# Patient Record
Sex: FEMALE | Race: BLACK OR AFRICAN AMERICAN | Employment: FULL TIME | URBAN - METROPOLITAN AREA
[De-identification: names, ages, dates, MRNs, and addresses within clinical notes are randomized per-mention and may not be internally consistent; named-entity substitution may affect disease eponyms.]

---

## 2017-01-11 ENCOUNTER — OFFICE VISIT (OUTPATIENT)
Dept: FAMILY MEDICINE CLINIC | Age: 47
End: 2017-01-11

## 2017-01-11 VITALS
HEART RATE: 79 BPM | DIASTOLIC BLOOD PRESSURE: 88 MMHG | HEIGHT: 68 IN | SYSTOLIC BLOOD PRESSURE: 132 MMHG | TEMPERATURE: 97.8 F | OXYGEN SATURATION: 98 % | BODY MASS INDEX: 27.74 KG/M2 | WEIGHT: 183 LBS | RESPIRATION RATE: 14 BRPM

## 2017-01-11 DIAGNOSIS — E66.9 OBESITY, CLASS I, BMI 30-34.9: ICD-10-CM

## 2017-01-11 DIAGNOSIS — E78.2 MIXED HYPERLIPIDEMIA: Primary | ICD-10-CM

## 2017-01-11 RX ORDER — ATORVASTATIN CALCIUM 40 MG/1
40 TABLET, FILM COATED ORAL DAILY
Qty: 90 TAB | Refills: 1 | Status: SHIPPED | OUTPATIENT
Start: 2017-01-11 | End: 2017-07-10 | Stop reason: SDUPTHER

## 2017-01-11 NOTE — PATIENT INSTRUCTIONS
Body Mass Index: Care Instructions  Your Care Instructions    Body mass index (BMI) can help you see if your weight is raising your risk for health problems. It uses a formula to compare how much you weigh with how tall you are. A BMI between 18.5 and 24.9 is considered healthy. A BMI between 25 and 29.9 is considered overweight. A BMI of 30 or higher is considered obese. If your BMI is in the normal range, it means that you have a lower risk for weight-related health problems. If your BMI is in the overweight or obese range, you may be at increased risk for weight-related health problems, such as high blood pressure, heart disease, stroke, arthritis or joint pain, and diabetes. BMI is just one measure of your risk for weight-related health problems. You may be at higher risk for health problems if you are not active, you eat an unhealthy diet, or you drink too much alcohol or use tobacco products. Follow-up care is a key part of your treatment and safety. Be sure to make and go to all appointments, and call your doctor if you are having problems. It's also a good idea to know your test results and keep a list of the medicines you take. How can you care for yourself at home? · Practice healthy eating habits. This includes eating plenty of fruits, vegetables, whole grains, lean protein, and low-fat dairy. · Get at least 30 minutes of exercise 5 days a week or more. Brisk walking is a good choice. You also may want to do other activities, such as running, swimming, cycling, or playing tennis or team sports. · Do not smoke. Smoking can increase your risk for health problems. If you need help quitting, talk to your doctor about stop-smoking programs and medicines. These can increase your chances of quitting for good. · Limit alcohol to 2 drinks a day for men and 1 drink a day for women. Too much alcohol can cause health problems.   If you have a BMI higher than 25  · Your doctor may do other tests to check your risk for weight-related health problems. This may include measuring the distance around your waist. A waist measurement of more than 40 inches in men or 35 inches in women can increase the risk of weight-related health problems. · Talk with your doctor about steps you can take to stay healthy or improve your health. You may need to make lifestyle changes to lose weight and stay healthy, such as changing your diet and getting regular exercise. Where can you learn more? Go to http://noé-jose.info/. Enter S176 in the search box to learn more about \"Body Mass Index: Care Instructions. \"  Current as of: February 16, 2016  Content Version: 11.1  © 8314-6402 [employer]. Care instructions adapted under license by [employer] (which disclaims liability or warranty for this information). If you have questions about a medical condition or this instruction, always ask your healthcare professional. [address] any warranty or liability for your use of this information.

## 2017-01-11 NOTE — LETTER
1/30/2017 9:00 AM 
 
Ms. Nicky Rodriguez [phone] Dear Nicky Rodriguez: 
 
Please find your most recent results below. Resulted Orders CBC WITH AUTOMATED DIFF Result Value Ref Range WBC 5.9 3.4 - 10.8 x10E3/uL  
 RBC 4.14 3.77 - 5.28 x10E6/uL HGB 13.0 11.1 - 15.9 g/dL HCT 38.3 34.0 - 46.6 % MCV 93 79 - 97 fL  
 MCH 31.4 26.6 - 33.0 pg  
 MCHC 33.9 31.5 - 35.7 g/dL  
 RDW 13.0 12.3 - 15.4 % PLATELET 906 059 - 664 x10E3/uL NEUTROPHILS 55 % Lymphocytes 36 % MONOCYTES 8 % EOSINOPHILS 1 % BASOPHILS 0 %  
 ABS. NEUTROPHILS 3.2 1.4 - 7.0 x10E3/uL Abs Lymphocytes 2.1 0.7 - 3.1 x10E3/uL  
 ABS. MONOCYTES 0.5 0.1 - 0.9 x10E3/uL  
 ABS. EOSINOPHILS 0.0 0.0 - 0.4 x10E3/uL  
 ABS. BASOPHILS 0.0 0.0 - 0.2 x10E3/uL IMMATURE GRANULOCYTES 0 %  
 ABS. IMM. GRANS. 0.0 0.0 - 0.1 x10E3/uL Narrative Performed at:  [address]  [id number] : Trang Leal MD, Phone:  [phone] METABOLIC PANEL, COMPREHENSIVE Result Value Ref Range Glucose 91 65 - 99 mg/dL BUN 8 6 - 24 mg/dL Creatinine 1.06 (H) 0.57 - 1.00 mg/dL GFR est non-AA 63 >59 mL/min/1.73 GFR est AA 72 >59 mL/min/1.73  
 BUN/Creatinine ratio 8 (L) 9 - 23 Sodium 140 134 - 144 mmol/L Potassium 4.0 3.5 - 5.2 mmol/L Chloride 102 96 - 106 mmol/L  
 CO2 21 18 - 29 mmol/L Calcium 8.9 8.7 - 10.2 mg/dL Protein, total 7.0 6.0 - 8.5 g/dL Albumin 4.1 3.5 - 5.5 g/dL GLOBULIN, TOTAL 2.9 1.5 - 4.5 g/dL A-G Ratio 1.4 1.1 - 2.5 Bilirubin, total 0.2 0.0 - 1.2 mg/dL Alk. phosphatase 73 39 - 117 IU/L  
 AST 11 0 - 40 IU/L  
 ALT 9 0 - 32 IU/L Narrative Performed at:  [address]  [id number] : Trang Leal MD, Phone:  [phone] LIPID PANEL Result Value Ref Range Cholesterol, total 159 100 - 199 mg/dL Triglyceride 103 0 - 149 mg/dL HDL Cholesterol 52 >39 mg/dL VLDL, calculated 21 5 - 40 mg/dL LDL, calculated 86 0 - 99 mg/dL Narrative Performed at:  [address]  [id number] : Vika Bowles MD, Phone:  [phone] CVD REPORT Result Value Ref Range INTERPRETATION Note Comment:  
   Supplement report is available. Narrative Performed at:  [address] [place] [address]  [id number] : Stu Curtis PhD, Phone:  [phone] RECOMMENDATIONS: 
Your labs are stable. Please call me if you have any questions: [phone] Sincerely, Sheree Callahan MD

## 2017-01-11 NOTE — PROGRESS NOTES
Patient presents in office today for 6 month fasting office visit for chl. No other concerns    Reviewed record in preparation for visit and have obtained necessary documentation. Identified pt with two pt identifiers(name and ). There are no preventive care reminders to display for this patient.       Chief Complaint   Patient presents with    Cholesterol Problem     6 month fasting follow up visit        Wt Readings from Last 3 Encounters:   17 183 lb (83 kg)   16 199 lb (90.3 kg)   16 186 lb (84.4 kg)     Temp Readings from Last 3 Encounters:   17 97.8 °F (36.6 °C) (Oral)   16 95.6 °F (35.3 °C) (Oral)   16 97.9 °F (36.6 °C) (Oral)     BP Readings from Last 3 Encounters:   17 141/88   16 138/72   16 138/80     Pulse Readings from Last 3 Encounters:   17 79   16 69   16 77           Learning Assessment:  :     Learning Assessment 2017 2016 2014 3/27/2014   PRIMARY LEARNER Patient Patient Patient Patient   HIGHEST LEVEL OF EDUCATION - PRIMARY LEARNER  - 4 YEARS OF COLLEGE 4 YEARS OF COLLEGE -   BARRIERS PRIMARY LEARNER - - NONE -   CO-LEARNER CAREGIVER - - No -   PRIMARY LANGUAGE ENGLISH ENGLISH ENGLISH ENGLISH    NEED - - No -   LEARNER PREFERENCE PRIMARY READING READING READING READING     - - DEMONSTRATION -   ANSWERED BY patient patient patient patient   RELATIONSHIP SELF SELF SELF SELF       Coordination of Care Questionnaire:  :     1) Have you been to an emergency room, urgent care clinic since your last visit? no   Hospitalized since your last visit? no             2) Have you seen or consulted any other health care providers outside of [address] [place] [address] since your last visit? no  (Include any pap smears or colon screenings in this section.)    Patient is accompanied by self I have received verbal consent from Alban Carvajal to discuss any/all medical information while they are present in the room.

## 2017-01-11 NOTE — PROGRESS NOTES
HISTORY OF PRESENT ILLNESS  Richard Coles is a 51 y.o. female. Blood pressure 141/88, pulse 79, temperature 97.8 °F (36.6 °C), temperature source Oral, resp. rate 14, height 5' 8\" (1.727 m), weight 183 lb (83 kg), last menstrual period 01/21/2016, SpO2 98 %. Body mass index is 27.83 kg/(m^2). Chief Complaint   Patient presents with    Cholesterol Problem     6 month fasting follow up visit        HPI   Richard Coles 51 y.o. female  presents to the office today for a follow up on chronic conditions. Elevated bp: Bp at office today 141/88, 132/88 with manual arm cuff recheck. Monitor. Hyperlipidemia: Lipid panel on 07/13/16 notable for total cholesterol 165, LDL 94, HDL 52, and triglycerides 97. Pt continues with Lipitor 40 mg daily. Cholesterol is presumed stable, will assess levels today. Weight management: Pt continues with Contrave  mg daily. Pt has lost about 16 lbs since 07/13/16 and states Contrave has helped her with reducing appetite. She notes she is taking 2 tablets Contrave daily instead of BID because she feels the daily dosage is enough to help with appetite and due to associated side effects of elevated bp. Congratulated pt on weight loss and advised her to continue current regimen. Current Outpatient Prescriptions   Medication Sig Dispense Refill    naltrexone-buPROPion (CONTRAVE) 8-90 mg TbER ER tablet First Month: Contrave 8mg/90mg #70    Week 1 : 1 Tab AM  Week 2 : 1 Tab AM, 1 Tab PM  Week 3 : 2 Tab AM, 1 Tab PM  Week 4 : 2 Tab AM, 2 Tab PM    Week 5 and Beyond: Contrave 8mg/90mg #120   2 Tab AM, 2 Tab  Tab 3    atorvastatin (LIPITOR) 40 mg tablet Take 1 Tab by mouth daily. 90 Tab 1    doxylamine succinate (UNISOM) 25 mg tablet Take 25 mg by mouth nightly as needed for Sleep.  naproxen (NAPROSYN) 500 mg tablet Take 1 Tab by mouth two (2) times daily (with meals).  30 Tab 3    norethindrone-ethinyl estradiol (BALZIVA, 28,) 0.4-35 mg-mcg per tablet Take 1 Tab by mouth daily.  FLUARIX QUAD 0550-5836, PF, syrg injection inject 0.5 milliliter intramuscularly  0     Allergies   Allergen Reactions    Percocet [Oxycodone-Acetaminophen] Itching     Past Medical History   Diagnosis Date    Hypercholesterolemia      Past Surgical History   Procedure Laterality Date    Hx gyn       abnormal pap, fibroids    Hx cholecystectomy  2002           Family History   Problem Relation Age of Onset    Asthma Mother     Elevated Lipids Father     Hypertension Father     Cancer Father      anal cancer    Diabetes Paternal Aunt     Cancer Maternal Grandmother      colon    Cancer Paternal Grandmother      leukemia     Social History   Substance Use Topics    Smoking status: Former Smoker     Quit date: 1/13/2013    Smokeless tobacco: Never Used    Alcohol use Yes      Comment: occassional        Review of Systems   Constitutional: Negative. Negative for malaise/fatigue. Eyes: Negative for blurred vision. Respiratory: Negative for shortness of breath. Cardiovascular: Negative for chest pain and leg swelling. Musculoskeletal: Negative. Neurological: Negative. Negative for dizziness and headaches. All other systems reviewed and are negative. Physical Exam   Constitutional: She is oriented to person, place, and time. She appears well-developed and well-nourished. No distress. HENT:   Head: Normocephalic and atraumatic. Neck: Carotid bruit is not present. Cardiovascular: Normal rate, regular rhythm, normal heart sounds and intact distal pulses. Exam reveals no gallop and no friction rub. No murmur heard. Pulmonary/Chest: Effort normal and breath sounds normal. No respiratory distress. She has no wheezes. She has no rales. Musculoskeletal: She exhibits no edema. Neurological: She is alert and oriented to person, place, and time. Skin: She is not diaphoretic. Psychiatric: She has a normal mood and affect.  Her behavior is normal. Judgment and thought content normal.   Nursing note and vitals reviewed. ASSESSMENT and PLAN  Angie Hough was seen today for cholesterol problem. Diagnoses and all orders for this visit:    Mixed hyperlipidemia  -     atorvastatin (LIPITOR) 40 mg tablet; Take 1 Tab by mouth daily.  -     METABOLIC PANEL, COMPREHENSIVE  -     LIPID PANEL  - Presumed stable, will assess levels today    Elevated blood pressure  -     CBC WITH AUTOMATED DIFF  - Bp came down with recheck. Monitor. Obesity, Class I, BMI 30-34.9  -     naltrexone-buPROPion (CONTRAVE) 8-90 mg TbER ER tablet; Take 2 Tabs by mouth daily. Indications: WT LOSS MGMT, PT WITH BMI 27-29 & WT-RELATED COMORBIDITY  - Discussed the patient's above normal BMI with her. I have recommended the following interventions: dietary management education, guidance, and counseling  dietary needs education  encourage exercise  feeding regime  lifestyle education regarding diet  monitor weight  nutrition therapy . The BMI follow up plan is as follows: BMI is out of normal parameters and plan is as follows: I have counseled this patient on diet and exercise regimens      Follow-up Disposition:  Return in about 6 months (around 7/11/2017) for hyperlipidemia follow up. Medication risks/benefits/costs/interactions/alternatives discussed with patient. Advised patient to call back or return to office if symptoms worsen/change/persist.  If patient cannot reach us or should anything more severe/urgent arise he/she should proceed directly to the nearest emergency department. Discussed expected course/resolution/complications of diagnosis in detail with patient. Patient given a written after visit summary which includes her diagnoses, current medications and vitals. Patient expressed understanding with the diagnosis and plan. Written by fermín Juarez, as dictated by Maura Halsted, M.D.    I have reviewed and agree with the above note and have made corrections where appropriate, Dr. Tiffanie Ceja MD

## 2017-01-11 NOTE — MR AVS SNAPSHOT
Visit Information Date & Time Provider Department Dept. Phone Encounter #  
 1/11/2017  8:00 AM Bryan Rodriguez MD [address] [place] [address] [phone] [id number] Follow-up Instructions Return in about 6 months (around 7/11/2017) for hyperlipidemia follow up. Upcoming Health Maintenance Date Due  
 PAP AKA CERVICAL CYTOLOGY 10/29/2017 DTaP/Tdap/Td series (2 - Td) 10/29/2024 Allergies as of 1/11/2017  Review Complete On: 1/11/2017 By: Bryan Rodriguez MD  
  
 Severity Noted Reaction Type Reactions Percocet [Oxycodone-acetaminophen] Low 03/27/2014   Systemic Itching Current Immunizations  Reviewed on 10/31/2014 Name Date Influenza Vaccine 10/12/2016, 10/29/2014, 10/17/2013 Tdap 10/29/2014 Not reviewed this visit You Were Diagnosed With   
  
 Codes Comments Mixed hyperlipidemia    -  Primary ICD-10-CM: X68.0 ICD-9-CM: 272.2 Elevated blood pressure     ICD-10-CM: I10 
ICD-9-CM: 401.9 Obesity, Class I, BMI 30-34.9     ICD-10-CM: E66.9 ICD-9-CM: 278.00 Vitals BP Pulse Temp Resp Height(growth percentile) Weight(growth percentile) 132/88 79 97.8 °F (36.6 °C) (Oral) 14 5' 8\" (1.727 m) 183 lb (83 kg) LMP SpO2 BMI OB Status Smoking Status 01/21/2016 (Within Days) 98% 27.83 kg/m2 Having regular periods Former Smoker Vitals History BMI and BSA Data Body Mass Index Body Surface Area  
 27.83 kg/m 2 2 m 2 Preferred Pharmacy Pharmacy Name Phone [address], [facility] [phone] Your Updated Medication List  
  
   
This list is accurate as of: 1/11/17  9:00 AM.  Always use your most recent med list.  
  
  
  
  
 atorvastatin 40 mg tablet Commonly known as:  LIPITOR Take 1 Tab by mouth daily. BALZIVA (28) 0.4-35 mg-mcg Tab Generic drug:  norethindrone-ethinyl estradiol Take 1 Tab by mouth daily. doxylamine succinate 25 mg tablet Commonly known as:  Eugene Shearer Take 25 mg by mouth nightly as needed for Sleep. FLUARIX QUAD [id number] (PF) Syrg injection Generic drug:  influenza vaccine 2016-17 (36mos+)(PF)  
inject 0.5 milliliter intramuscularly  
  
 naltrexone-buPROPion 8-90 mg Tber ER tablet Commonly known as:  Kiet Legacy Take 2 Tabs by mouth daily. Indications: WT LOSS MGMT, PT WITH BMI 27-29 & WT-RELATED COMORBIDITY  
  
 naproxen 500 mg tablet Commonly known as:  NAPROSYN Take 1 Tab by mouth two (2) times daily (with meals). Prescriptions Sent to Pharmacy Refills  
 naltrexone-buPROPion (CONTRAVE) 8-90 mg TbER ER tablet 1 Sig: Take 2 Tabs by mouth daily. Indications: WT LOSS MGMT, PT WITH BMI 27-29 & WT-RELATED COMORBIDITY Class: Normal  
 Pharmacy: [address], [address] Ph #: [phone] Route: Oral  
 atorvastatin (LIPITOR) 40 mg tablet 1 Sig: Take 1 Tab by mouth daily. Class: Normal  
 Pharmacy: [address], [address] Ph #: [phone] Route: Oral  
  
We Performed the Following CBC WITH AUTOMATED DIFF [85615 CPT(R)] LIPID PANEL [93183 CPT(R)] METABOLIC PANEL, COMPREHENSIVE [82173 CPT(R)] Follow-up Instructions Return in about 6 months (around 7/11/2017) for hyperlipidemia follow up. Patient Instructions Body Mass Index: Care Instructions Your Care Instructions Body mass index (BMI) can help you see if your weight is raising your risk for health problems. It uses a formula to compare how much you weigh with how tall you are. A BMI between 18.5 and 24.9 is considered healthy. A BMI between 25 and 29.9 is considered overweight. A BMI of 30 or higher is considered obese. If your BMI is in the normal range, it means that you have a lower risk for weight-related health problems.  If your BMI is in the overweight or obese range, you may be at increased risk for weight-related health problems, such as high blood pressure, heart disease, stroke, arthritis or joint pain, and diabetes. BMI is just one measure of your risk for weight-related health problems. You may be at higher risk for health problems if you are not active, you eat an unhealthy diet, or you drink too much alcohol or use tobacco products. Follow-up care is a key part of your treatment and safety. Be sure to make and go to all appointments, and call your doctor if you are having problems. It's also a good idea to know your test results and keep a list of the medicines you take. How can you care for yourself at home? · Practice healthy eating habits. This includes eating plenty of fruits, vegetables, whole grains, lean protein, and low-fat dairy. · Get at least 30 minutes of exercise 5 days a week or more. Brisk walking is a good choice. You also may want to do other activities, such as running, swimming, cycling, or playing tennis or team sports. · Do not smoke. Smoking can increase your risk for health problems. If you need help quitting, talk to your doctor about stop-smoking programs and medicines. These can increase your chances of quitting for good. · Limit alcohol to 2 drinks a day for men and 1 drink a day for women. Too much alcohol can cause health problems. If you have a BMI higher than 25 · Your doctor may do other tests to check your risk for weight-related health problems. This may include measuring the distance around your waist. A waist measurement of more than 40 inches in men or 35 inches in women can increase the risk of weight-related health problems. · Talk with your doctor about steps you can take to stay healthy or improve your health. You may need to make lifestyle changes to lose weight and stay healthy, such as changing your diet and getting regular exercise. Where can you learn more? Go to http://noé-jose.info/. Enter S176 in the search box to learn more about \"Body Mass Index: Care Instructions. \" Current as of: February 16, 2016 Content Version: 11.1 © 2317-4303 [employer]. Care instructions adapted under license by [employer] (which disclaims liability or warranty for this information). If you have questions about a medical condition or this instruction, always ask your healthcare professional. [address] any warranty or liability for your use of this information. Introducing [facility]! Dear Abigail Hernandez: Thank you for requesting a [employer] account. Our records indicate that you already have an active [employer] account. You can access your account anytime at [email or web address] Did you know that you can access your hospital and ER discharge instructions at any time in [employer]? You can also review all of your test results from your hospital stay or ER visit. Additional Information If you have questions, please visit the Frequently Asked Questions section of the [employer] website at [email or web address] Remember, [employer] is NOT to be used for urgent needs. For medical emergencies, dial 911. Now available from your iPhone and Android! Please provide this summary of care documentation to your next provider. Your primary care clinician is listed as Zaire Sahni. If you have any questions after today's visit, please call [phone].

## 2017-01-12 LAB
ALBUMIN SERPL-MCNC: 4.1 G/DL (ref 3.5–5.5)
ALBUMIN/GLOB SERPL: 1.4 {RATIO} (ref 1.1–2.5)
ALP SERPL-CCNC: 73 IU/L (ref 39–117)
ALT SERPL-CCNC: 9 IU/L (ref 0–32)
AST SERPL-CCNC: 11 IU/L (ref 0–40)
BASOPHILS # BLD AUTO: 0 X10E3/UL (ref 0–0.2)
BASOPHILS NFR BLD AUTO: 0 %
BILIRUB SERPL-MCNC: 0.2 MG/DL (ref 0–1.2)
BUN SERPL-MCNC: 8 MG/DL (ref 6–24)
BUN/CREAT SERPL: 8 (ref 9–23)
CALCIUM SERPL-MCNC: 8.9 MG/DL (ref 8.7–10.2)
CHLORIDE SERPL-SCNC: 102 MMOL/L (ref 96–106)
CHOLEST SERPL-MCNC: 159 MG/DL (ref 100–199)
CO2 SERPL-SCNC: 21 MMOL/L (ref 18–29)
CREAT SERPL-MCNC: 1.06 MG/DL (ref 0.57–1)
EOSINOPHIL # BLD AUTO: 0 X10E3/UL (ref 0–0.4)
EOSINOPHIL NFR BLD AUTO: 1 %
ERYTHROCYTE [DISTWIDTH] IN BLOOD BY AUTOMATED COUNT: 13 % (ref 12.3–15.4)
GLOBULIN SER CALC-MCNC: 2.9 G/DL (ref 1.5–4.5)
GLUCOSE SERPL-MCNC: 91 MG/DL (ref 65–99)
HCT VFR BLD AUTO: 38.3 % (ref 34–46.6)
HDLC SERPL-MCNC: 52 MG/DL
HGB BLD-MCNC: 13 G/DL (ref 11.1–15.9)
IMM GRANULOCYTES # BLD: 0 X10E3/UL (ref 0–0.1)
IMM GRANULOCYTES NFR BLD: 0 %
INTERPRETATION, 910389: NORMAL
LDLC SERPL CALC-MCNC: 86 MG/DL (ref 0–99)
LYMPHOCYTES # BLD AUTO: 2.1 X10E3/UL (ref 0.7–3.1)
LYMPHOCYTES NFR BLD AUTO: 36 %
MCH RBC QN AUTO: 31.4 PG (ref 26.6–33)
MCHC RBC AUTO-ENTMCNC: 33.9 G/DL (ref 31.5–35.7)
MCV RBC AUTO: 93 FL (ref 79–97)
MONOCYTES # BLD AUTO: 0.5 X10E3/UL (ref 0.1–0.9)
MONOCYTES NFR BLD AUTO: 8 %
NEUTROPHILS # BLD AUTO: 3.2 X10E3/UL (ref 1.4–7)
NEUTROPHILS NFR BLD AUTO: 55 %
PLATELET # BLD AUTO: 345 X10E3/UL (ref 150–379)
POTASSIUM SERPL-SCNC: 4 MMOL/L (ref 3.5–5.2)
PROT SERPL-MCNC: 7 G/DL (ref 6–8.5)
RBC # BLD AUTO: 4.14 X10E6/UL (ref 3.77–5.28)
SODIUM SERPL-SCNC: 140 MMOL/L (ref 134–144)
TRIGL SERPL-MCNC: 103 MG/DL (ref 0–149)
VLDLC SERPL CALC-MCNC: 21 MG/DL (ref 5–40)
WBC # BLD AUTO: 5.9 X10E3/UL (ref 3.4–10.8)

## 2017-01-23 NOTE — PROGRESS NOTES
Inform pt to go to my chart to see results and recommendations    Labs are stable  See you as advised

## 2017-07-10 DIAGNOSIS — E78.2 MIXED HYPERLIPIDEMIA: ICD-10-CM

## 2017-07-12 ENCOUNTER — OFFICE VISIT (OUTPATIENT)
Dept: FAMILY MEDICINE CLINIC | Age: 47
End: 2017-07-12

## 2017-07-12 VITALS
DIASTOLIC BLOOD PRESSURE: 88 MMHG | WEIGHT: 184 LBS | TEMPERATURE: 97.6 F | HEIGHT: 63 IN | SYSTOLIC BLOOD PRESSURE: 138 MMHG | HEART RATE: 70 BPM | RESPIRATION RATE: 16 BRPM | BODY MASS INDEX: 32.6 KG/M2 | OXYGEN SATURATION: 98 %

## 2017-07-12 DIAGNOSIS — M25.531 RIGHT WRIST PAIN: ICD-10-CM

## 2017-07-12 DIAGNOSIS — Z12.39 SCREENING FOR MALIGNANT NEOPLASM OF BREAST: ICD-10-CM

## 2017-07-12 DIAGNOSIS — R03.0 ELEVATED BP WITHOUT DIAGNOSIS OF HYPERTENSION: ICD-10-CM

## 2017-07-12 DIAGNOSIS — E78.2 MIXED HYPERLIPIDEMIA: Primary | ICD-10-CM

## 2017-07-12 DIAGNOSIS — E66.9 OBESITY, CLASS I, BMI 30-34.9: ICD-10-CM

## 2017-07-12 DIAGNOSIS — F41.9 ANXIETY: ICD-10-CM

## 2017-07-12 RX ORDER — ATORVASTATIN CALCIUM 40 MG/1
TABLET, FILM COATED ORAL
Qty: 90 TAB | Refills: 0 | Status: SHIPPED | OUTPATIENT
Start: 2017-07-12 | End: 2017-10-10 | Stop reason: SDUPTHER

## 2017-07-12 RX ORDER — NAPROXEN 500 MG/1
500 TABLET ORAL 2 TIMES DAILY WITH MEALS
Qty: 30 TAB | Refills: 3 | Status: SHIPPED | OUTPATIENT
Start: 2017-07-12 | End: 2020-05-10

## 2017-07-12 NOTE — PROGRESS NOTES
1. Have you been to the ER, urgent care clinic since your last visit? Hospitalized since your last visit? No    2. Have you seen or consulted any other health care providers outside of the [address] [place] [address] since your last visit? Include any pap smears or colon screening. No     Chief Complaint   Patient presents with    Cholesterol Problem     follow up    Headache     states that has been having headaches during cycle- it lasts during the whole cycle.       Fasting

## 2017-07-12 NOTE — MR AVS SNAPSHOT
Visit Information Date & Time Provider Department Dept. Phone Encounter #  
 7/12/2017  8:00 AM Manool Sanchez MD [facility] [phone] [id number] Follow-up Instructions Return in about 6 months (around 1/12/2018) for hyperlipidemia follow up. Upcoming Health Maintenance Date Due  
 PAP AKA CERVICAL CYTOLOGY 10/29/2017 INFLUENZA AGE 9 TO ADULT 8/1/2017 DTaP/Tdap/Td series (2 - Td) 10/29/2024 Allergies as of 7/12/2017  Review Complete On: 7/12/2017 By: Manolo Sanchez MD  
  
 Severity Noted Reaction Type Reactions Percocet [Oxycodone-acetaminophen] Low 03/27/2014   Systemic Itching Current Immunizations  Reviewed on 10/31/2014 Name Date Influenza Vaccine 10/12/2016, 10/29/2014, 10/17/2013 Tdap 10/29/2014 Not reviewed this visit You Were Diagnosed With   
  
 Codes Comments Mixed hyperlipidemia    -  Primary ICD-10-CM: K26.3 ICD-9-CM: 272.2 Anxiety     ICD-10-CM: F41.9 ICD-9-CM: 300.00 Obesity, Class I, BMI 30-34.9     ICD-10-CM: E66.9 ICD-9-CM: 278.00 Screening for malignant neoplasm of breast     ICD-10-CM: Z12.39 
ICD-9-CM: V76.10 Right wrist pain     ICD-10-CM: M25.531 ICD-9-CM: 719.43 Elevated BP without diagnosis of hypertension     ICD-10-CM: R03.0 ICD-9-CM: 796.2 Vitals BP Pulse Temp Resp Height(growth percentile) Weight(growth percentile) 143/86 (BP 1 Location: Left arm, BP Patient Position: Sitting) 70 97.6 °F (36.4 °C) (Oral) 16 5' 3\" (1.6 m) 184 lb (83.5 kg) LMP SpO2 BMI OB Status Smoking Status 07/05/2017 98% 32.59 kg/m2 Having regular periods Former Smoker Vitals History BMI and BSA Data Body Mass Index Body Surface Area 32.59 kg/m 2 1.93 m 2 Preferred Pharmacy Pharmacy Name Phone [address] [facility] [phone] Your Updated Medication List  
  
   
 This list is accurate as of: 7/12/17  8:43 AM.  Always use your most recent med list.  
  
  
  
  
 atorvastatin 40 mg tablet Commonly known as:  LIPITOR  
TAKE 1 TABLET DAILY BALZIVA (28) 0.4-35 mg-mcg Tab Generic drug:  norethindrone-ethinyl estradiol Take 1 Tab by mouth daily. doxylamine succinate 25 mg tablet Commonly known as:  Lelon Ariela Take 25 mg by mouth nightly as needed for Sleep. FLUARIX QUAD [id number] (PF) Syrg injection Generic drug:  influenza vaccine 2016-17 (36mos+)(PF)  
inject 0.5 milliliter intramuscularly  
  
 naltrexone-buPROPion 8-90 mg Tber ER tablet Commonly known as:  Pepito Nares Take 2 Tabs by mouth daily. Indications: WT LOSS MGMT, PT WITH BMI 27-29 & WT-RELATED COMORBIDITY  
  
 naproxen 500 mg tablet Commonly known as:  NAPROSYN Take 1 Tab by mouth two (2) times daily (with meals). zolpidem 10 mg tablet Commonly known as:  AMBIEN Take  by mouth nightly as needed for Sleep. Prescriptions Sent to Pharmacy Refills  
 naproxen (NAPROSYN) 500 mg tablet 3 Sig: Take 1 Tab by mouth two (2) times daily (with meals). Class: Normal  
 Pharmacy: [address], [address] [place] [address] #: [phone] Route: Oral  
  
We Performed the Following CBC WITH AUTOMATED DIFF [27797 CPT(R)] LIPID PANEL [25187 CPT(R)] METABOLIC PANEL, COMPREHENSIVE [77285 CPT(R)] Follow-up Instructions Return in about 6 months (around 1/12/2018) for hyperlipidemia follow up. To-Do List   
 07/12/2017 Imaging:  ERMA MAMMO BI SCREENING INCL CAD Introducing hospitals & HEALTH SERVICES! Dear Kamala Comp: Thank you for requesting a [employer] account. Our records indicate that you already have an active [employer] account. You can access your account anytime at [email or web address]. [facility]/[employer] Did you know that you can access your hospital and ER discharge instructions at any time in [employer]? You can also review all of your test results from your hospital stay or ER visit. Additional Information If you have questions, please visit the Frequently Asked Questions section of the [employer] website at [email or web address] Remember, [employer] is NOT to be used for urgent needs. For medical emergencies, dial 911. Now available from your iPhone and Android! Please provide this summary of care documentation to your next provider. Your primary care clinician is listed as Jacky Rome. If you have any questions after today's visit, please call [phone].

## 2017-07-12 NOTE — PROGRESS NOTES
HISTORY OF PRESENT ILLNESS  Tony Bueno is a 49 y.o. female. Blood pressure 138/88, pulse 70, temperature 97.6 °F (36.4 °C), temperature source Oral, resp. rate 16, height 5' 3\" (1.6 m), weight 184 lb (83.5 kg), last menstrual period 07/05/2017, SpO2 98 %. Body mass index is 32.59 kg/(m^2). Chief Complaint   Patient presents with    Cholesterol Problem     follow up    Headache     states that has been having headaches during cycle- it lasts during the whole cycle. HPI   Tony Bueno 49 y.o. female  presents to the office today for cholesterol follow up and headache. Bp is 138/88 with rina arm cuff recheck    Hyperlipidemia: Lipid panel on 01/11/17 notable for total cholesterol 159, LDL 86, HDL 52, and triglycerides 103. Pt continues with Lipitor 40 mg daily. Presumed stable, will assess levels today. Headache: Pt complains of headaches that happen during her cycles that last for 2-3 days. Pt states that her last headache was on Sunday. Pt states that she hasn't been drinking enough water. I have advised pt to increase water intake to alleviate symptoms. Right Wrist Pain: Pt states that she has had right wrist pain recently, although she notes that pain has subsided for past 2 weeks. Pt states that  fluid sometimes builds in the wrist. I have advised pt to monitor sodium intake and continue current medications for right wrist pain. Pt to notify me if symptoms progress or fail to improve. Health Maintenance: Pt had PAP done by Dr. Josh Frederick. (Gynecology). Pt is due for mammogram.      Current Outpatient Prescriptions   Medication Sig Dispense Refill    atorvastatin (LIPITOR) 40 mg tablet TAKE 1 TABLET DAILY 90 Tab 0    naproxen (NAPROSYN) 500 mg tablet Take 1 Tab by mouth two (2) times daily (with meals). 30 Tab 3    zolpidem (AMBIEN) 10 mg tablet Take  by mouth nightly as needed for Sleep.  naltrexone-buPROPion (CONTRAVE) 8-90 mg TbER ER tablet Take 2 Tabs by mouth daily. Indications: WT LOSS MGMT, PT WITH BMI 27-29 & WT-RELATED COMORBIDITY 180 Tab 1    doxylamine succinate (UNISOM) 25 mg tablet Take 25 mg by mouth nightly as needed for Sleep.  norethindrone-ethinyl estradiol (BALZIVA, 28,) 0.4-35 mg-mcg per tablet Take 1 Tab by mouth daily.  FLUARIX QUAD [id number], PF, syrg injection inject 0.5 milliliter intramuscularly  0     Allergies   Allergen Reactions    Percocet [Oxycodone-Acetaminophen] Itching     Past Medical History:   Diagnosis Date    Hypercholesterolemia      Past Surgical History:   Procedure Laterality Date    HX CHOLECYSTECTOMY  2002         HX GYN      abnormal pap, fibroids     Family History   Problem Relation Age of Onset    Asthma Mother     Elevated Lipids Father     Hypertension Father     Cancer Father      anal cancer    Diabetes Paternal Aunt     Cancer Maternal Grandmother      colon    Cancer Paternal Grandmother      leukemia     Social History   Substance Use Topics    Smoking status: Former Smoker     Quit date: 1/13/2013    Smokeless tobacco: Never Used    Alcohol use Yes      Comment: occassional          Review of Systems   Constitutional: Negative. Negative for malaise/fatigue. Eyes: Negative for blurred vision. Respiratory: Negative for shortness of breath. Cardiovascular: Negative for chest pain and leg swelling. Musculoskeletal: Positive for joint pain (Right Wrist). Neurological: Positive for headaches. Negative for dizziness. Psychiatric/Behavioral: The patient is nervous/anxious. All other systems reviewed and are negative. Physical Exam   Constitutional: She is oriented to person, place, and time. She appears well-developed and well-nourished. No distress. HENT:   Head: Normocephalic and atraumatic. Neck: Carotid bruit is not present. Cardiovascular: Normal rate, regular rhythm, normal heart sounds and intact distal pulses. Exam reveals no gallop and no friction rub.     No murmur heard.  Pulmonary/Chest: Effort normal and breath sounds normal. No respiratory distress. She has no wheezes. She has no rales. Musculoskeletal: She exhibits no edema. Right Wrist:   Flexion,extension, rotation- normal  No swelling noted  Phalens- negative, Tinels- negative  Finkelstein's- negative   Neurological: She is alert and oriented to person, place, and time. Skin: She is not diaphoretic. Psychiatric: She has a normal mood and affect. Her behavior is normal. Judgment and thought content normal.   Nursing note and vitals reviewed. ASSESSMENT and PLAN  Hui Doss was seen today for cholesterol problem and headache. Diagnoses and all orders for this visit:    Mixed hyperlipidemia  -     METABOLIC PANEL, COMPREHENSIVE  -     LIPID PANEL  - Presumed stable, will assess levels today. Anxiety        - Stable, continue current regimen. Obesity, Class I, BMI 30-34.9        - I have reviewed/discussed the above normal BMI with the patient. I have recommended the following interventions: dietary management education, guidance, and counseling, encourage exercise and monitor weight . Screening for malignant neoplasm of breast  -     ERMA MAMMOGRAM SCREENING DIGITAL BILAT; Future    Right wrist pain  -     naproxen (NAPROSYN) 500 mg tablet; Take 1 Tab by mouth two (2) times daily (with meals). - Pt to monitor symptoms and continue current regimen. Pt to notify me if symptoms progress or fail to improve. Elevated BP without diagnosis of hypertension  -     CBC WITH AUTOMATED DIFF  - Bp is under adequate control, but want systolic bp to be under 363. Continue to monitor bp outside of office. Follow-up Disposition:  Return in about 6 months (around 1/12/2018) for hyperlipidemia follow up. Medication risks/benefits/costs/interactions/alternatives discussed with patient.   Advised patient to call back or return to office if symptoms worsen/change/persist.  If patient cannot reach us or should anything more severe/urgent arise he/she should proceed directly to the nearest emergency department. Discussed expected course/resolution/complications of diagnosis in detail with patient. Patient given a written after visit summary which includes her diagnoses, current medications and vitals. Patient expressed understanding with the diagnosis and plan. Written by fermín Ahn, as dictated by Kely Moreno M.D.   I have reviewed and agree with the above note and have made corrections where appropriate, Dr. Bassam Adams MD

## 2017-07-13 LAB
ALBUMIN SERPL-MCNC: 4 G/DL (ref 3.5–5.5)
ALBUMIN/GLOB SERPL: 1.4 {RATIO} (ref 1.2–2.2)
ALP SERPL-CCNC: 60 IU/L (ref 39–117)
ALT SERPL-CCNC: 10 IU/L (ref 0–32)
AST SERPL-CCNC: 14 IU/L (ref 0–40)
BASOPHILS # BLD AUTO: 0 X10E3/UL (ref 0–0.2)
BASOPHILS NFR BLD AUTO: 0 %
BILIRUB SERPL-MCNC: 0.4 MG/DL (ref 0–1.2)
BUN SERPL-MCNC: 13 MG/DL (ref 6–24)
BUN/CREAT SERPL: 13 (ref 9–23)
CALCIUM SERPL-MCNC: 9.1 MG/DL (ref 8.7–10.2)
CHLORIDE SERPL-SCNC: 103 MMOL/L (ref 96–106)
CHOLEST SERPL-MCNC: 194 MG/DL (ref 100–199)
CO2 SERPL-SCNC: 24 MMOL/L (ref 18–29)
CREAT SERPL-MCNC: 1 MG/DL (ref 0.57–1)
EOSINOPHIL # BLD AUTO: 0 X10E3/UL (ref 0–0.4)
EOSINOPHIL NFR BLD AUTO: 1 %
ERYTHROCYTE [DISTWIDTH] IN BLOOD BY AUTOMATED COUNT: 12.7 % (ref 12.3–15.4)
GLOBULIN SER CALC-MCNC: 2.9 G/DL (ref 1.5–4.5)
GLUCOSE SERPL-MCNC: 87 MG/DL (ref 65–99)
HCT VFR BLD AUTO: 38.5 % (ref 34–46.6)
HDLC SERPL-MCNC: 60 MG/DL
HGB BLD-MCNC: 12.5 G/DL (ref 11.1–15.9)
IMM GRANULOCYTES # BLD: 0 X10E3/UL (ref 0–0.1)
IMM GRANULOCYTES NFR BLD: 0 %
INTERPRETATION, 910389: NORMAL
LDLC SERPL CALC-MCNC: 114 MG/DL (ref 0–99)
LYMPHOCYTES # BLD AUTO: 2.6 X10E3/UL (ref 0.7–3.1)
LYMPHOCYTES NFR BLD AUTO: 47 %
MCH RBC QN AUTO: 30.9 PG (ref 26.6–33)
MCHC RBC AUTO-ENTMCNC: 32.5 G/DL (ref 31.5–35.7)
MCV RBC AUTO: 95 FL (ref 79–97)
MONOCYTES # BLD AUTO: 0.4 X10E3/UL (ref 0.1–0.9)
MONOCYTES NFR BLD AUTO: 7 %
NEUTROPHILS # BLD AUTO: 2.5 X10E3/UL (ref 1.4–7)
NEUTROPHILS NFR BLD AUTO: 45 %
PLATELET # BLD AUTO: 341 X10E3/UL (ref 150–379)
POTASSIUM SERPL-SCNC: 4.4 MMOL/L (ref 3.5–5.2)
PROT SERPL-MCNC: 6.9 G/DL (ref 6–8.5)
RBC # BLD AUTO: 4.04 X10E6/UL (ref 3.77–5.28)
SODIUM SERPL-SCNC: 141 MMOL/L (ref 134–144)
TRIGL SERPL-MCNC: 98 MG/DL (ref 0–149)
VLDLC SERPL CALC-MCNC: 20 MG/DL (ref 5–40)
WBC # BLD AUTO: 5.4 X10E3/UL (ref 3.4–10.8)

## 2017-07-16 NOTE — PROGRESS NOTES
Inform pt to go to my chart to see results and recommendations    Labs are stable  Keep up the good work  See you in 6 months    Any questions let me know

## 2017-08-04 ENCOUNTER — HOSPITAL ENCOUNTER (OUTPATIENT)
Dept: MAMMOGRAPHY | Age: 47
Discharge: HOME | End: 2017-08-04
Attending: FAMILY MEDICINE
Payer: COMMERCIAL

## 2017-08-04 DIAGNOSIS — Z12.39 SCREENING FOR MALIGNANT NEOPLASM OF BREAST: ICD-10-CM

## 2017-08-04 PROCEDURE — 77067 SCR MAMMO BI INCL CAD: CPT

## 2017-08-14 NOTE — PROGRESS NOTES
RECOMMENDATIONS:  Next screening mammogram is recommended in one year.      The patient will be notified of these results.

## 2017-10-10 DIAGNOSIS — E78.2 MIXED HYPERLIPIDEMIA: ICD-10-CM

## 2017-10-10 RX ORDER — ATORVASTATIN CALCIUM 40 MG/1
TABLET, FILM COATED ORAL
Qty: 90 TAB | Refills: 0 | Status: SHIPPED | OUTPATIENT
Start: 2017-10-10 | End: 2018-01-08 | Stop reason: SDUPTHER

## 2017-11-01 DIAGNOSIS — E66.9 OBESITY, CLASS I, BMI 30-34.9: ICD-10-CM

## 2017-11-01 RX ORDER — NALTREXONE HYDROCHLORIDE AND BUPROPION HYDROCHLORIDE 8; 90 MG/1; MG/1
TABLET, EXTENDED RELEASE ORAL
Qty: 120 TAB | Refills: 3 | Status: SHIPPED | OUTPATIENT
Start: 2017-11-01 | End: 2018-02-14

## 2018-01-08 DIAGNOSIS — E78.2 MIXED HYPERLIPIDEMIA: ICD-10-CM

## 2018-01-09 RX ORDER — ATORVASTATIN CALCIUM 40 MG/1
TABLET, FILM COATED ORAL
Qty: 90 TAB | Refills: 0 | Status: SHIPPED | OUTPATIENT
Start: 2018-01-09 | End: 2018-04-08 | Stop reason: SDUPTHER

## 2018-01-10 ENCOUNTER — OFFICE VISIT (OUTPATIENT)
Dept: FAMILY MEDICINE CLINIC | Age: 48
End: 2018-01-10

## 2018-01-10 VITALS
HEIGHT: 63 IN | HEART RATE: 81 BPM | BODY MASS INDEX: 35.79 KG/M2 | SYSTOLIC BLOOD PRESSURE: 140 MMHG | OXYGEN SATURATION: 97 % | DIASTOLIC BLOOD PRESSURE: 73 MMHG | TEMPERATURE: 98 F | WEIGHT: 202 LBS | RESPIRATION RATE: 18 BRPM

## 2018-01-10 DIAGNOSIS — F33.9 EPISODE OF RECURRENT MAJOR DEPRESSIVE DISORDER, UNSPECIFIED DEPRESSION EPISODE SEVERITY (HCC): ICD-10-CM

## 2018-01-10 DIAGNOSIS — R51.9 INTRACTABLE EPISODIC HEADACHE, UNSPECIFIED HEADACHE TYPE: ICD-10-CM

## 2018-01-10 DIAGNOSIS — E66.9 OBESITY, CLASS II, BMI 35-39.9: ICD-10-CM

## 2018-01-10 DIAGNOSIS — F41.9 ANXIETY: ICD-10-CM

## 2018-01-10 DIAGNOSIS — I10 ESSENTIAL HYPERTENSION: Primary | ICD-10-CM

## 2018-01-10 DIAGNOSIS — E78.2 MIXED HYPERLIPIDEMIA: ICD-10-CM

## 2018-01-10 RX ORDER — NORETHINDRONE ACETATE AND ETHINYL ESTRADIOL, AND FERROUS FUMARATE 1MG-20(24)
KIT ORAL
COMMUNITY
Start: 2017-12-22 | End: 2020-05-25

## 2018-01-10 RX ORDER — HYDROCHLOROTHIAZIDE 12.5 MG/1
12.5 TABLET ORAL DAILY
Qty: 30 TAB | Refills: 5 | Status: SHIPPED | OUTPATIENT
Start: 2018-01-10 | End: 2018-12-17 | Stop reason: SDUPTHER

## 2018-01-10 NOTE — PROGRESS NOTES
HISTORY OF PRESENT ILLNESS  Pernell Perez is a 49 y.o. female. Blood pressure 140/73, pulse 81, temperature 98 °F (36.7 °C), temperature source Oral, resp. rate 18, height 5' 3\" (1.6 m), weight 202 lb (91.6 kg), last menstrual period 12/22/2017, SpO2 97 %. Body mass index is 35.78 kg/(m^2). Chief Complaint   Patient presents with    Cholesterol Problem        HPI  Pernell Perez 49 y.o. female  presents to the office today for cholesterol follow up. Hypertension: Bp at office today 140/73, 142/96 by manual arm cuff recheck. Pt states that she has changed birth control and wondered if that can cause the spike in her Bp. Pt reports that she has been having headaches and occasional nausea and notes she has a family history of hypertension. She notes that she has been having headaches 1-2 times a month. Denies any vomiting, dizziness, or lightheadedness. Discussed with pt that if she can lose weight her Bp will lower. Will start pt on HCTZ 12.5mg daily and have her keep a log of her Bp at home. Hyperlipidemia: Lipid panel on 07/12/17 notable for total cholesterol 194, , HDL 60, and triglycerides 98. Pt continues with Atorvastatin 40mg daily. Presumed stable, will assess levels today. Health maintenance:  Advised her to try getting more weekly exercise and eat a healthier diet. Discussed with pt that I want her to try to lose 1lbs a week. Pt had a mammogram on 08/04/17 which was unremarkable. Current Outpatient Prescriptions   Medication Sig Dispense Refill    TAYTULLA 1 mg-20 mcg (24)/75 mg (4) cap       hydroCHLOROthiazide (HYDRODIURIL) 12.5 mg tablet Take 1 Tab by mouth daily. 30 Tab 5    atorvastatin (LIPITOR) 40 mg tablet TAKE 1 TABLET DAILY 90 Tab 0    CONTRAVE 8-90 mg TbER ER tablet TAKE AS INSTRUCTED BY YOUR PRESCRIBER 120 Tab 3    naproxen (NAPROSYN) 500 mg tablet Take 1 Tab by mouth two (2) times daily (with meals).  30 Tab 3    zolpidem (AMBIEN) 10 mg tablet Take  by mouth nightly as needed for Sleep.  doxylamine succinate (UNISOM) 25 mg tablet Take 25 mg by mouth nightly as needed for Sleep.  norethindrone-ethinyl estradiol (BALZIVA, Kari,) 0.4-35 mg-mcg per tablet Take 1 Tab by mouth daily. Allergies   Allergen Reactions    Percocet [Oxycodone-Acetaminophen] Itching     Past Medical History:   Diagnosis Date    Hypercholesterolemia      Past Surgical History:   Procedure Laterality Date    HX CHOLECYSTECTOMY  2002         HX GYN      abnormal pap, fibroids     Family History   Problem Relation Age of Onset    Asthma Mother     Elevated Lipids Father     Hypertension Father     Cancer Father      anal cancer    Diabetes Paternal Aunt     Cancer Maternal Grandmother      colon    Cancer Paternal Grandmother      leukemia     Social History   Substance Use Topics    Smoking status: Former Smoker     Quit date: 1/13/2013    Smokeless tobacco: Never Used    Alcohol use Yes      Comment: occassional        Review of Systems   Constitutional: Negative. Negative for malaise/fatigue. Eyes: Negative for blurred vision. Respiratory: Negative for shortness of breath. Cardiovascular: Negative for chest pain and leg swelling. Musculoskeletal: Negative. Neurological: Positive for headaches. Negative for dizziness. All other systems reviewed and are negative. Physical Exam   Constitutional: She is oriented to person, place, and time. She appears well-developed and well-nourished. No distress. HENT:   Head: Normocephalic and atraumatic. Neck: Carotid bruit is not present. Cardiovascular: Normal rate, regular rhythm, normal heart sounds and intact distal pulses. Exam reveals no gallop and no friction rub. No murmur heard. Pulmonary/Chest: Effort normal and breath sounds normal. No respiratory distress. She has no wheezes. She has no rales. Musculoskeletal: She exhibits no edema.    Neurological: She is alert and oriented to person, place, and time. Skin: She is not diaphoretic. Psychiatric: She has a normal mood and affect. Her behavior is normal. Judgment and thought content normal.   Nursing note and vitals reviewed. ASSESSMENT and PLAN  Diagnoses and all orders for this visit:    1. Essential hypertension        - METABOLIC PANEL, COMPREHENSIVE  -     hydroCHLOROthiazide (HYDRODIURIL) 12.5 mg tablet; Take 1 Tab by mouth daily. - Bp at office today 140/73, 142/96 by manual arm cuff recheck. Discussed with pt that if she can lose weight her Bp will lower. Will start pt on HCTZ 12.5mg daily and have her keep a log of her Bp at home. 2. Mixed hyperlipidemia  -     METABOLIC PANEL, COMPREHENSIVE  -     LIPID PANEL        - Lipid panel on 07/12/17 notable for total cholesterol 194, , HDL 60, and triglycerides 98. Pt continues with Atorvastatin 40mg daily. Presumed stable, will assess levels today. .    3. Obesity, Class II, BMI 35-39.9         - I have reviewed/discussed the above normal BMI with the patient. I have recommended the following interventions: dietary management education, guidance, and counseling, encourage exercise and monitor weight . 4. Anxiety         - Pt reports that she is having no current issues with anxiety or depression. Stable, continue current regimen. 5. Episode of recurrent major depressive disorder, unspecified depression episode severity ([facility] [address]  Assessment & Plan:  Stable, based on history, physical exam and review of pertinent labs, studies and medications; meds reconciled; continue current treatment plan. Key Psychotherapeutic Meds             zolpidem (AMBIEN) 10 mg tablet  (Taking) Take  by mouth nightly as needed for Sleep. doxylamine succinate (UNISOM) 25 mg tablet  (Taking) Take 25 mg by mouth nightly as needed for Sleep. Other [id number] [facility]     The patient is on no other behavioral health meds.         Lab Results   Component Value Date/Time    Sodium 141 07/12/2017 08:46 AM    Creatinine 1.00 07/12/2017 08:46 AM    WBC 5.4 07/12/2017 08:46 AM    ALT (SGPT) 10 07/12/2017 08:46 AM    AST (SGOT) 14 07/12/2017 08:46 AM         6. Intractable episodic headache, unspecified headache type          -  Will start pt on HCTZ 12.5mg and have her keep a log of her Bp at home. - Will follow up with pt in 1 month. Follow-up Disposition:  Return in about 1 month (around 2/10/2018) for hypertension follow up. Medication risks/benefits/costs/interactions/alternatives discussed with patient. Advised patient to call back or return to office if symptoms worsen/change/persist.  If patient cannot reach us or should anything more severe/urgent arise he/she should proceed directly to the nearest emergency department. Discussed expected course/resolution/complications of diagnosis in detail with patient. Patient given a written after visit summary which includes her diagnoses, current medications and vitals. Patient expressed understanding with the diagnosis and plan. Written by fermní Alonso, as dictated by Essie Swan M.D.   I have reviewed and agree with the above note and have made corrections where appropriate, Dr. Ctarina Rg MD

## 2018-01-10 NOTE — MR AVS SNAPSHOT
Visit Information Date & Time Provider Department Dept. Phone Encounter #  
 1/10/2018  8:00 AM Katie Naylor  [facility], [place] [phone] [id number] Follow-up Instructions Return in about 1 month (around 2/10/2018) for hypertension follow up. Upcoming Health Maintenance Date Due  
 PAP AKA CERVICAL CYTOLOGY 11/22/2019 DTaP/Tdap/Td series (2 - Td) 10/29/2024 Allergies as of 1/10/2018  Review Complete On: 1/10/2018 By: Katie Naylor MD  
  
 Severity Noted Reaction Type Reactions Percocet [Oxycodone-acetaminophen] Low 03/27/2014   Systemic Itching Current Immunizations  Reviewed on 10/31/2014 Name Date Influenza Vaccine 10/12/2016, 10/29/2014, 10/17/2013 Tdap 10/29/2014 Not reviewed this visit You Were Diagnosed With   
  
 Codes Comments Essential hypertension    -  Primary ICD-10-CM: I10 
ICD-9-CM: 401.9 Mixed hyperlipidemia     ICD-10-CM: E78.2 ICD-9-CM: 272.2 Obesity, Class II, BMI 35-39.9     ICD-10-CM: E66.9 ICD-9-CM: 278.00 Anxiety     ICD-10-CM: F41.9 ICD-9-CM: 300.00 Episode of recurrent major depressive disorder, unspecified depression episode severity ([facility] [place] [address]     ICD-10-CM: F33.9 ICD-9-CM: 296.30 Intractable episodic headache, unspecified headache type     ICD-10-CM: R51 ICD-9-CM: 411. 0 Vitals BP Pulse Temp Resp Height(growth percentile) Weight(growth percentile) 140/73 (BP 1 Location: Right arm, BP Patient Position: Sitting) 81 98 °F (36.7 °C) (Oral) 18 5' 3\" (1.6 m) 202 lb (91.6 kg) LMP SpO2 BMI OB Status Smoking Status 12/22/2017 (Exact Date) 97% 35.78 kg/m2 Having regular periods Former Smoker Vitals History BMI and BSA Data Body Mass Index Body Surface Area 35.78 kg/m 2 2.02 m 2 Preferred Pharmacy Pharmacy Name Phone [facility] [id number] [facility], [address] [place] [address] [phone] Your Updated Medication List  
 This list is accurate as of: 1/10/18  8:37 AM.  Always use your most recent med list.  
  
  
  
  
 atorvastatin 40 mg tablet Commonly known as:  LIPITOR  
TAKE 1 TABLET DAILY BALZIVA (28) 0.4-35 mg-mcg Tab Generic drug:  norethindrone-ethinyl estradiol Take 1 Tab by mouth daily. CONTRAVE 8-90 mg Tber ER tablet Generic drug:  naltrexone-buPROPion TAKE AS INSTRUCTED BY YOUR PRESCRIBER  
  
 doxylamine succinate 25 mg tablet Commonly known as:  Norrine Distad Take 25 mg by mouth nightly as needed for Sleep.  
  
 hydroCHLOROthiazide 12.5 mg tablet Commonly known as:  HYDRODIURIL Take 1 Tab by mouth daily. naproxen 500 mg tablet Commonly known as:  NAPROSYN Take 1 Tab by mouth two (2) times daily (with meals). TAYTULLA 1 mg-20 mcg (24)/75 mg (4) Cap Generic drug:  norethindrone-e.estradiol-iron  
  
 zolpidem 10 mg tablet Commonly known as:  AMBIEN Take  by mouth nightly as needed for Sleep. Prescriptions Sent to Pharmacy Refills  
 hydroCHLOROthiazide (HYDRODIURIL) 12.5 mg tablet 5 Sig: Take 1 Tab by mouth daily. Class: Normal  
 [address] Ph #: [phone] Route: Oral  
  
Follow-up Instructions Return in about 1 month (around 2/10/2018) for hypertension follow up. Patient Instructions High Blood Pressure: Care Instructions Your Care Instructions If your blood pressure is usually above 140/90, you have high blood pressure, or hypertension. That means the top number is 140 or higher or the bottom number is 90 or higher, or both. Despite what a lot of people think, high blood pressure usually doesn't cause headaches or make you feel dizzy or lightheaded. It usually has no symptoms. But it does increase your risk for heart attack, stroke, and kidney or eye damage. The higher your blood pressure, the more your risk increases. Your doctor will give you a goal for your blood pressure. Your goal will be based on your health and your age. An example of a goal is to keep your blood pressure below 140/90. Lifestyle changes, such as eating healthy and being active, are always important to help lower blood pressure. You might also take medicine to reach your blood pressure goal. 
Follow-up care is a key part of your treatment and safety. Be sure to make and go to all appointments, and call your doctor if you are having problems. It's also a good idea to know your test results and keep a list of the medicines you take. How can you care for yourself at home? Medical treatment · If you stop taking your medicine, your blood pressure will go back up. You may take one or more types of medicine to lower your blood pressure. Be safe with medicines. Take your medicine exactly as prescribed. Call your doctor if you think you are having a problem with your medicine. · Talk to your doctor before you start taking aspirin every day. Aspirin can help certain people lower their risk of a heart attack or stroke. But taking aspirin isn't right for everyone, because it can cause serious bleeding. · See your doctor regularly. You may need to see the doctor more often at first or until your blood pressure comes down. · If you are taking blood pressure medicine, talk to your doctor before you take decongestants or anti-inflammatory medicine, such as ibuprofen. Some of these medicines can raise blood pressure. · Learn how to check your blood pressure at home. Lifestyle changes · Stay at a healthy weight. This is especially important if you put on weight around the waist. Losing even 10 pounds can help you lower your blood pressure. · If your doctor recommends it, get more exercise. Walking is a good choice. Bit by bit, increase the amount you walk every day. Try for at least 30 minutes on most days of the week.  You also may want to swim, bike, or do other activities. · Avoid or limit alcohol. Talk to your doctor about whether you can drink any alcohol. · Try to limit how much sodium you eat to less than 2,300 milligrams (mg) a day. Your doctor may ask you to try to eat less than 1,500 mg a day. · Eat plenty of fruits (such as bananas and oranges), vegetables, legumes, whole grains, and low-fat dairy products. · Lower the amount of saturated fat in your diet. Saturated fat is found in animal products such as milk, cheese, and meat. Limiting these foods may help you lose weight and also lower your risk for heart disease. · Do not smoke. Smoking increases your risk for heart attack and stroke. If you need help quitting, talk to your doctor about stop-smoking programs and medicines. These can increase your chances of quitting for good. When should you call for help? Call 911 anytime you think you may need emergency care. This may mean having symptoms that suggest that your blood pressure is causing a serious heart or blood vessel problem. Your blood pressure may be over 180/110. ? For example, call 911 if: 
? · You have symptoms of a heart attack. These may include: ¨ Chest pain or pressure, or a strange feeling in the chest. 
¨ Sweating. ¨ Shortness of breath. ¨ Nausea or vomiting. ¨ Pain, pressure, or a strange feeling in the back, neck, jaw, or upper belly or in one or both shoulders or arms. ¨ Lightheadedness or sudden weakness. ¨ A fast or irregular heartbeat. ? · You have symptoms of a stroke. These may include: 
¨ Sudden numbness, tingling, weakness, or loss of movement in your face, arm, or leg, especially on only one side of your body. ¨ Sudden vision changes. ¨ Sudden trouble speaking. ¨ Sudden confusion or trouble understanding simple statements. ¨ Sudden problems with walking or balance. ¨ A sudden, severe headache that is different from past headaches. ? · You have severe back or belly pain. ?Do not wait until your blood pressure comes down on its own. Get help right away. ?Call your doctor now or seek immediate care if: 
? · Your blood pressure is much higher than normal (such as 180/110 or higher), but you don't have symptoms. ? · You think high blood pressure is causing symptoms, such as: ¨ Severe headache. ¨ Blurry vision. ? Watch closely for changes in your health, and be sure to contact your doctor if: 
? · Your blood pressure measures 140/90 or higher at least 2 times. That means the top number is 140 or higher or the bottom number is 90 or higher, or both. ? · You think you may be having side effects from your blood pressure medicine. ? · Your blood pressure is usually normal, but it goes above normal at least 2 times. Where can you learn more? Go to http://noé-jose.info/. Enter J351 in the search box to learn more about \"High Blood Pressure: Care Instructions. \" Current as of: September 21, 2016 Content Version: 11.4 © 6820-3936 [employer]. Care instructions adapted under license by [employer] (which disclaims liability or warranty for this information). If you have questions about a medical condition or this instruction, always ask your healthcare professional. [address] any warranty or liability for your use of this information. Introducing [facility]! Dear Cookie Louis: Thank you for requesting a Fiesta Frog account. Our records indicate that you already have an active Fiesta Frog account. You can access your account anytime at [email or web address] Did you know that you can access your hospital and ER discharge instructions at any time in Fiesta Frog? You can also review all of your test results from your hospital stay or ER visit. Additional Information If you have questions, please visit the Frequently Asked Questions section of the Prylos website at [email or web address] Remember, Prylos is NOT to be used for urgent needs. For medical emergencies, dial 911. Now available from your iPhone and Android! Please provide this summary of care documentation to your next provider. Your primary care clinician is listed as Sindy Hinojosa. If you have any questions after today's visit, please call [phone].

## 2018-01-10 NOTE — ASSESSMENT & PLAN NOTE
Stable, based on history, physical exam and review of pertinent labs, studies and medications; meds reconciled; continue current treatment plan. Key Psychotherapeutic Meds             zolpidem (AMBIEN) 10 mg tablet  (Taking) Take  by mouth nightly as needed for Sleep. doxylamine succinate (UNISOM) 25 mg tablet  (Taking) Take 25 mg by mouth nightly as needed for Sleep. Other [address] [place] [address]     The patient is on no other behavioral health meds.         Lab Results   Component Value Date/Time    Sodium 141 07/12/2017 08:46 AM    Creatinine 1.00 07/12/2017 08:46 AM    WBC 5.4 07/12/2017 08:46 AM    ALT (SGPT) 10 07/12/2017 08:46 AM    AST (SGOT) 14 07/12/2017 08:46 AM

## 2018-01-10 NOTE — PROGRESS NOTES
Chief Complaint   Patient presents with    Cholesterol Problem       Reviewed Record in preparation for visit and have obtained necessary documentation. Identified pt with two pt identifiers (Name @ )    Health Maintenance Due   Topic    Influenza Age 5 to Adult          1. Have you been to the ER, urgent care clinic since your last visit? Hospitalized since your last visit? No    2. Have you seen or consulted any other health care providers outside of the [address] [place] [address] since your last visit? Include any pap smears or colon screening.  No

## 2018-01-10 NOTE — PATIENT INSTRUCTIONS
High Blood Pressure: Care Instructions  Your Care Instructions    If your blood pressure is usually above 140/90, you have high blood pressure, or hypertension. That means the top number is 140 or higher or the bottom number is 90 or higher, or both. Despite what a lot of people think, high blood pressure usually doesn't cause headaches or make you feel dizzy or lightheaded. It usually has no symptoms. But it does increase your risk for heart attack, stroke, and kidney or eye damage. The higher your blood pressure, the more your risk increases. Your doctor will give you a goal for your blood pressure. Your goal will be based on your health and your age. An example of a goal is to keep your blood pressure below 140/90. Lifestyle changes, such as eating healthy and being active, are always important to help lower blood pressure. You might also take medicine to reach your blood pressure goal.  Follow-up care is a key part of your treatment and safety. Be sure to make and go to all appointments, and call your doctor if you are having problems. It's also a good idea to know your test results and keep a list of the medicines you take. How can you care for yourself at home? Medical treatment  · If you stop taking your medicine, your blood pressure will go back up. You may take one or more types of medicine to lower your blood pressure. Be safe with medicines. Take your medicine exactly as prescribed. Call your doctor if you think you are having a problem with your medicine. · Talk to your doctor before you start taking aspirin every day. Aspirin can help certain people lower their risk of a heart attack or stroke. But taking aspirin isn't right for everyone, because it can cause serious bleeding. · See your doctor regularly. You may need to see the doctor more often at first or until your blood pressure comes down.   · If you are taking blood pressure medicine, talk to your doctor before you take decongestants or anti-inflammatory medicine, such as ibuprofen. Some of these medicines can raise blood pressure. · Learn how to check your blood pressure at home. Lifestyle changes  · Stay at a healthy weight. This is especially important if you put on weight around the waist. Losing even 10 pounds can help you lower your blood pressure. · If your doctor recommends it, get more exercise. Walking is a good choice. Bit by bit, increase the amount you walk every day. Try for at least 30 minutes on most days of the week. You also may want to swim, bike, or do other activities. · Avoid or limit alcohol. Talk to your doctor about whether you can drink any alcohol. · Try to limit how much sodium you eat to less than 2,300 milligrams (mg) a day. Your doctor may ask you to try to eat less than 1,500 mg a day. · Eat plenty of fruits (such as bananas and oranges), vegetables, legumes, whole grains, and low-fat dairy products. · Lower the amount of saturated fat in your diet. Saturated fat is found in animal products such as milk, cheese, and meat. Limiting these foods may help you lose weight and also lower your risk for heart disease. · Do not smoke. Smoking increases your risk for heart attack and stroke. If you need help quitting, talk to your doctor about stop-smoking programs and medicines. These can increase your chances of quitting for good. When should you call for help? Call 911 anytime you think you may need emergency care. This may mean having symptoms that suggest that your blood pressure is causing a serious heart or blood vessel problem. Your blood pressure may be over 180/110. ? For example, call 911 if:  ? · You have symptoms of a heart attack. These may include:  ¨ Chest pain or pressure, or a strange feeling in the chest.  ¨ Sweating. ¨ Shortness of breath. ¨ Nausea or vomiting.   ¨ Pain, pressure, or a strange feeling in the back, neck, jaw, or upper belly or in one or both shoulders or arms.  ¨ Lightheadedness or sudden weakness. ¨ A fast or irregular heartbeat. ? · You have symptoms of a stroke. These may include:  ¨ Sudden numbness, tingling, weakness, or loss of movement in your face, arm, or leg, especially on only one side of your body. ¨ Sudden vision changes. ¨ Sudden trouble speaking. ¨ Sudden confusion or trouble understanding simple statements. ¨ Sudden problems with walking or balance. ¨ A sudden, severe headache that is different from past headaches. ? · You have severe back or belly pain. ?Do not wait until your blood pressure comes down on its own. Get help right away. ?Call your doctor now or seek immediate care if:  ? · Your blood pressure is much higher than normal (such as 180/110 or higher), but you don't have symptoms. ? · You think high blood pressure is causing symptoms, such as:  ¨ Severe headache. ¨ Blurry vision. ? Watch closely for changes in your health, and be sure to contact your doctor if:  ? · Your blood pressure measures 140/90 or higher at least 2 times. That means the top number is 140 or higher or the bottom number is 90 or higher, or both. ? · You think you may be having side effects from your blood pressure medicine. ? · Your blood pressure is usually normal, but it goes above normal at least 2 times. Where can you learn more? Go to http://noé-jose.info/. Enter I962 in the search box to learn more about \"High Blood Pressure: Care Instructions. \"  Current as of: September 21, 2016  Content Version: 11.4  © 3369-5363 [employer]. Care instructions adapted under license by [employer] (which disclaims liability or warranty for this information). If you have questions about a medical condition or this instruction, always ask your healthcare professional. [place] [address] any warranty or liability for your use of this information.

## 2018-01-11 LAB
ALBUMIN SERPL-MCNC: 4.3 G/DL (ref 3.5–5.5)
ALBUMIN/GLOB SERPL: 1.5 {RATIO} (ref 1.2–2.2)
ALP SERPL-CCNC: 66 IU/L (ref 39–117)
ALT SERPL-CCNC: 8 IU/L (ref 0–32)
AST SERPL-CCNC: 13 IU/L (ref 0–40)
BILIRUB SERPL-MCNC: 0.4 MG/DL (ref 0–1.2)
BUN SERPL-MCNC: 9 MG/DL (ref 6–24)
BUN/CREAT SERPL: 9 (ref 9–23)
CALCIUM SERPL-MCNC: 9 MG/DL (ref 8.7–10.2)
CHLORIDE SERPL-SCNC: 102 MMOL/L (ref 96–106)
CHOLEST SERPL-MCNC: 162 MG/DL (ref 100–199)
CO2 SERPL-SCNC: 23 MMOL/L (ref 18–29)
CREAT SERPL-MCNC: 1.04 MG/DL (ref 0.57–1)
GLOBULIN SER CALC-MCNC: 2.9 G/DL (ref 1.5–4.5)
GLUCOSE SERPL-MCNC: 91 MG/DL (ref 65–99)
HDLC SERPL-MCNC: 48 MG/DL
INTERPRETATION, 910389: NORMAL
LDLC SERPL CALC-MCNC: 95 MG/DL (ref 0–99)
POTASSIUM SERPL-SCNC: 4.4 MMOL/L (ref 3.5–5.2)
PROT SERPL-MCNC: 7.2 G/DL (ref 6–8.5)
SODIUM SERPL-SCNC: 140 MMOL/L (ref 134–144)
TRIGL SERPL-MCNC: 96 MG/DL (ref 0–149)
VLDLC SERPL CALC-MCNC: 19 MG/DL (ref 5–40)

## 2018-01-11 RX ORDER — NORETHINDRONE AND ETHINYL ESTRADIOL 0.4-0.035
KIT ORAL
COMMUNITY
Start: 2017-11-21 | End: 2018-02-14

## 2018-01-11 NOTE — PROGRESS NOTES
Inform pt to go to my chart to see results and recommendations    Labs are stable  Keep up the good work. Please remember to be well hydrated.     Any questions let me know

## 2018-02-14 ENCOUNTER — OFFICE VISIT (OUTPATIENT)
Dept: FAMILY MEDICINE CLINIC | Age: 48
End: 2018-02-14

## 2018-02-14 VITALS
WEIGHT: 208 LBS | RESPIRATION RATE: 18 BRPM | OXYGEN SATURATION: 98 % | TEMPERATURE: 98.2 F | HEIGHT: 63 IN | BODY MASS INDEX: 36.86 KG/M2 | SYSTOLIC BLOOD PRESSURE: 128 MMHG | HEART RATE: 82 BPM | DIASTOLIC BLOOD PRESSURE: 88 MMHG

## 2018-02-14 DIAGNOSIS — J11.1 INFLUENZA: ICD-10-CM

## 2018-02-14 DIAGNOSIS — E66.9 OBESITY, CLASS II, BMI 35-39.9: ICD-10-CM

## 2018-02-14 DIAGNOSIS — I10 ESSENTIAL HYPERTENSION: Primary | ICD-10-CM

## 2018-02-14 PROBLEM — R03.0 ELEVATED BP WITHOUT DIAGNOSIS OF HYPERTENSION: Status: RESOLVED | Noted: 2017-07-12 | Resolved: 2018-02-14

## 2018-02-14 RX ORDER — OSELTAMIVIR PHOSPHATE 75 MG/1
75 CAPSULE ORAL 2 TIMES DAILY
Qty: 10 CAP | Refills: 0 | Status: SHIPPED | OUTPATIENT
Start: 2018-02-14 | End: 2018-02-19

## 2018-02-14 NOTE — PROGRESS NOTES
HISTORY OF PRESENT ILLNESS  Hunter Sorto is a 49 y.o. female. Blood pressure 128/88, pulse 82, temperature 98.2 °F (36.8 °C), temperature source Oral, resp. rate 18, height 5' 3\" (1.6 m), weight 208 lb (94.3 kg), last menstrual period 01/13/2018, SpO2 98 %. Body mass index is 36.85 kg/(m^2). Chief Complaint   Patient presents with    Hypertension     1 month follow up     Generalized Body Aches     started yesterday with aches all over, ? fever, chills, felt hot  also has scratchy throat,little congestion, did get flu vaccine         HPI  Hunter Sorto 49 y.o. female  presents to the office today for hypertension follow up and flu like symptoms. Flu symptoms: Pt states that starting yesterday she began having generalized body aches, fevers, chills, sore throat, and congestion. She notes that her coworkers are sick and her  started feeling sick today. Advised pt that I will give her Tamiflu 75mg BID for 5 days and recommended she call her son's pediatrician to get prophylactic Tamiflu  for him. Hypertension: Bp at office today 128/88. Pt continues with HCTZ 12.5mg daily. Pt notes that her Bp has dropped to 113/70 at home. Advised pt that she can discontinue her medication for 1 week and monitor her Bp at home. Discussed with her that if her Bp stays low we can take her off the medication. Hyperlipidemia: Lipid panel on 01/10/18 notable for total cholesterol 162, LDL 95, HDL 48, and triglycerides 96. Pt continues with Atorvastatin 40mg daily. Stable, continue current regimen. Health maintenance: Advised pt that if she can lose weight, we will be able to get her off of her medications. Current Outpatient Prescriptions   Medication Sig Dispense Refill    oseltamivir (TAMIFLU) 75 mg capsule Take 1 Cap by mouth two (2) times a day for 5 days. 10 Cap 0    TAYTULLA 1 mg-20 mcg (24)/75 mg (4) cap       hydroCHLOROthiazide (HYDRODIURIL) 12.5 mg tablet Take 1 Tab by mouth daily.  30 Tab 5  atorvastatin (LIPITOR) 40 mg tablet TAKE 1 TABLET DAILY 90 Tab 0    naproxen (NAPROSYN) 500 mg tablet Take 1 Tab by mouth two (2) times daily (with meals). 30 Tab 3    doxylamine succinate (UNISOM) 25 mg tablet Take 25 mg by mouth nightly as needed for Sleep. Allergies   Allergen Reactions    Percocet [Oxycodone-Acetaminophen] Itching     Past Medical History:   Diagnosis Date    Hypercholesterolemia      Past Surgical History:   Procedure Laterality Date    HX CHOLECYSTECTOMY  2002         HX GYN      abnormal pap, fibroids     Family History   Problem Relation Age of Onset    Asthma Mother     Elevated Lipids Father     Hypertension Father     Cancer Father      anal cancer    Diabetes Paternal Aunt     Cancer Maternal Grandmother      colon    Cancer Paternal Grandmother      leukemia     Social History   Substance Use Topics    Smoking status: Former Smoker     Quit date: 1/13/2013    Smokeless tobacco: Never Used    Alcohol use Yes      Comment: occassional        Review of Systems   Constitutional: Positive for chills and fever. Negative for malaise/fatigue. HENT: Positive for congestion and sore throat. Eyes: Negative for blurred vision. Respiratory: Negative for shortness of breath. Cardiovascular: Negative for chest pain and leg swelling. Musculoskeletal: Positive for myalgias (generalized). Neurological: Negative. Negative for dizziness and headaches. All other systems reviewed and are negative. Physical Exam   Constitutional: She is oriented to person, place, and time. She appears well-developed and well-nourished. No distress. HENT:   Head: Normocephalic and atraumatic. Neck: Carotid bruit is not present. Cardiovascular: Normal rate, regular rhythm, normal heart sounds and intact distal pulses. Exam reveals no gallop and no friction rub. No murmur heard. Pulmonary/Chest: Effort normal and breath sounds normal. No respiratory distress.  She has no wheezes. She has no rales. Musculoskeletal: She exhibits no edema. Neurological: She is alert and oriented to person, place, and time. Skin: She is not diaphoretic. Psychiatric: She has a normal mood and affect. Her behavior is normal. Judgment and thought content normal.   Nursing note and vitals reviewed. ASSESSMENT and PLAN  Diagnoses and all orders for this visit:    1. Essential hypertension        -     METABOLIC PANEL, BASIC        - Bp at office today 128/88. Pt continues with HCTZ 12.5mg daily.         - Advised pt that she can discontinue her medication for 1 week and monitor her Bp at home. - Discussed with her that if her Bp stays low we can take her off the medication. 2. Obesity, Class II, BMI 35-39.9        - I have reviewed/discussed the above normal BMI with the patient. I have recommended the following interventions: dietary management education, guidance, and counseling, encourage exercise and monitor weight . 3. Influenza  -     oseltamivir (TAMIFLU) 75 mg capsule; Take 1 Cap by mouth two (2) times a day for 5 days.  - Advised pt that I will give her Tamiflu 75mg BID for 5 days and recommended she call her son's pediatrician to get prophylacticTamiflu  for him. Follow-up Disposition:  Return in about 6 months (around 8/14/2018) for hypertension follow up. Medication risks/benefits/costs/interactions/alternatives discussed with patient. Advised patient to call back or return to office if symptoms worsen/change/persist.  If patient cannot reach us or should anything more severe/urgent arise he/she should proceed directly to the nearest emergency department. Discussed expected course/resolution/complications of diagnosis in detail with patient. Patient given a written after visit summary which includes her diagnoses, current medications and vitals. Patient expressed understanding with the diagnosis and plan.   Written by fermín Gan, as dictated by Karen Ramon M.D.   I have reviewed and agree with the above note and have made corrections where appropriate, Dr. Effie Roldan MD

## 2018-02-14 NOTE — PATIENT INSTRUCTIONS
Body Mass Index: Care Instructions  Your Care Instructions    Body mass index (BMI) can help you see if your weight is raising your risk for health problems. It uses a formula to compare how much you weigh with how tall you are. · A BMI lower than 18.5 is considered underweight. · A BMI between 18.5 and 24.9 is considered healthy. · A BMI between 25 and 29.9 is considered overweight. A BMI of 30 or higher is considered obese. If your BMI is in the normal range, it means that you have a lower risk for weight-related health problems. If your BMI is in the overweight or obese range, you may be at increased risk for weight-related health problems, such as high blood pressure, heart disease, stroke, arthritis or joint pain, and diabetes. If your BMI is in the underweight range, you may be at increased risk for health problems such as fatigue, lower protection (immunity) against illness, muscle loss, bone loss, hair loss, and hormone problems. BMI is just one measure of your risk for weight-related health problems. You may be at higher risk for health problems if you are not active, you eat an unhealthy diet, or you drink too much alcohol or use tobacco products. Follow-up care is a key part of your treatment and safety. Be sure to make and go to all appointments, and call your doctor if you are having problems. It's also a good idea to know your test results and keep a list of the medicines you take. How can you care for yourself at home? · Practice healthy eating habits. This includes eating plenty of fruits, vegetables, whole grains, lean protein, and low-fat dairy. · If your doctor recommends it, get more exercise. Walking is a good choice. Bit by bit, increase the amount you walk every day. Try for at least 30 minutes on most days of the week. · Do not smoke. Smoking can increase your risk for health problems. If you need help quitting, talk to your doctor about stop-smoking programs and medicines. These can increase your chances of quitting for good. · Limit alcohol to 2 drinks a day for men and 1 drink a day for women. Too much alcohol can cause health problems. If you have a BMI higher than 25  · Your doctor may do other tests to check your risk for weight-related health problems. This may include measuring the distance around your waist. A waist measurement of more than 40 inches in men or 35 inches in women can increase the risk of weight-related health problems. · Talk with your doctor about steps you can take to stay healthy or improve your health. You may need to make lifestyle changes to lose weight and stay healthy, such as changing your diet and getting regular exercise. If you have a BMI lower than 18.5  · Your doctor may do other tests to check your risk for health problems. · Talk with your doctor about steps you can take to stay healthy or improve your health. You may need to make lifestyle changes to gain or maintain weight and stay healthy, such as getting more healthy foods in your diet and doing exercises to build muscle. Where can you learn more? Go to http://noé-jose.info/. Enter S176 in the search box to learn more about \"Body Mass Index: Care Instructions. \"  Current as of: October 13, 2016  Content Version: 11.4  © 2439-0977 Healthwise, Incorporated. Care instructions adapted under license by [employer] (which disclaims liability or warranty for this information). If you have questions about a medical condition or this instruction, always ask your healthcare professional. [address] any warranty or liability for your use of this information.

## 2018-02-14 NOTE — PROGRESS NOTES
Chief Complaint   Patient presents with    Hypertension     1 month follow up     Generalized Body Aches     started yesterday with aches all over, ? fever, chills, felt hot  also has scratchy throat,little congestion, did get flu vaccine        Reviewed Record in preparation for visit and have obtained necessary documentation. Identified pt with two pt identifiers (Name @ )    There are no preventive care reminders to display for this patient. 1. Have you been to the ER, urgent care clinic since your last visit? Hospitalized since your last visit? No    2. Have you seen or consulted any other health care providers outside of the [address] [place] [address] since your last visit? Include any pap smears or colon screening.  No

## 2018-02-14 NOTE — LETTER
3/8/2018 2:14 PM 
 
Ms. Harry Harper [phone] Dear Harry Harper: 
 
Please find your most recent results below. Resulted Orders METABOLIC PANEL, BASIC Result Value Ref Range Glucose 63 (L) 65 - 99 mg/dL BUN 8 6 - 24 mg/dL Creatinine 0.97 0.57 - 1.00 mg/dL GFR est non-AA 69 >59 mL/min/1.73 GFR est AA 80 >59 mL/min/1.73  
 BUN/Creatinine ratio 8 (L) 9 - 23 Sodium 139 134 - 144 mmol/L Potassium 4.0 3.5 - 5.2 mmol/L Chloride 99 96 - 106 mmol/L  
 CO2 22 18 - 29 mmol/L Calcium 9.1 8.7 - 10.2 mg/dL Narrative Performed at:  [address]  [id number] : Valerie Sanchez MD, Phone:  [phone] Renal function stable Glucose was low Likely due to fasting, Please call me if you have any questions: [phone] Sincerely, Carol Mullen MD

## 2018-02-14 NOTE — MR AVS SNAPSHOT
[address] 
[phone] Patient: Taylor Duncan MRN: [id number] :1970 Visit Information Date & Time Provider Department Dept. Phone Encounter #  
 2018 10:15 AM Roscoe Huff MD [address] [place] [address] [phone] [id number] Follow-up Instructions Return in about 6 months (around 2018) for hypertension follow up. Upcoming Health Maintenance Date Due  
 PAP AKA CERVICAL CYTOLOGY 2019 DTaP/Tdap/Td series (2 - Td) 10/29/2024 Allergies as of 2018  Review Complete On: 2018 By: Roscoe Huff MD  
  
 Severity Noted Reaction Type Reactions Percocet [Oxycodone-acetaminophen] Low 2014   Systemic Itching Current Immunizations  Reviewed on 10/31/2014 Name Date Influenza Vaccine 10/12/2016, 10/29/2014, 10/17/2013 Tdap 10/29/2014 Not reviewed this visit You Were Diagnosed With   
  
 Codes Comments Essential hypertension    -  Primary ICD-10-CM: I10 
ICD-9-CM: 401.9 Obesity, Class II, BMI 35-39.9     ICD-10-CM: E66.9 ICD-9-CM: 278.00 Influenza     ICD-10-CM: J11.1 ICD-9-CM: 487. 1 Vitals BP Pulse Temp Resp Height(growth percentile) Weight(growth percentile) 128/88 (BP 1 Location: Left arm, BP Patient Position: Sitting) 82 98.2 °F (36.8 °C) (Oral) 18 5' 3\" (1.6 m) 208 lb (94.3 kg) LMP SpO2 BMI OB Status Smoking Status 2018 (Approximate) 98% 36.85 kg/m2 Having regular periods Former Smoker Vitals History BMI and BSA Data Body Mass Index Body Surface Area  
 36.85 kg/m 2 2.05 m 2 Preferred Pharmacy Pharmacy Name Phone [facility] [id number] [facility], [address] [place] [address] [phone] Your Updated Medication List  
  
   
This list is accurate as of: 18 10:58 AM.  Always use your most recent med list.  
  
  
  
  
 atorvastatin 40 mg tablet Commonly known as:  LIPITOR  
TAKE 1 TABLET DAILY  
  
 doxylamine succinate 25 mg tablet Commonly known as:  Rexine Glow Take 25 mg by mouth nightly as needed for Sleep.  
  
 hydroCHLOROthiazide 12.5 mg tablet Commonly known as:  HYDRODIURIL Take 1 Tab by mouth daily. naproxen 500 mg tablet Commonly known as:  NAPROSYN Take 1 Tab by mouth two (2) times daily (with meals). oseltamivir 75 mg capsule Commonly known as:  TAMIFLU Take 1 Cap by mouth two (2) times a day for 5 days. TAYTULLA 1 mg-20 mcg (24)/75 mg (4) Cap Generic drug:  norethindrone-e.estradiol-iron Prescriptions Sent to Pharmacy Refills  
 oseltamivir (TAMIFLU) 75 mg capsule 0 Sig: Take 1 Cap by mouth two (2) times a day for 5 days. Class: Normal  
 [facility]: [facility] [address] [place] [address] Ph #: [phone] Route: Oral  
  
Follow-up Instructions Return in about 6 months (around 8/14/2018) for hypertension follow up. Patient Instructions Body Mass Index: Care Instructions Your Care Instructions Body mass index (BMI) can help you see if your weight is raising your risk for health problems. It uses a formula to compare how much you weigh with how tall you are. · A BMI lower than 18.5 is considered underweight. · A BMI between 18.5 and 24.9 is considered healthy. · A BMI between 25 and 29.9 is considered overweight. A BMI of 30 or higher is considered obese. If your BMI is in the normal range, it means that you have a lower risk for weight-related health problems. If your BMI is in the overweight or obese range, you may be at increased risk for weight-related health problems, such as high blood pressure, heart disease, stroke, arthritis or joint pain, and diabetes.  If your BMI is in the underweight range, you may be at increased risk for health problems such as fatigue, lower protection (immunity) against illness, muscle loss, bone loss, hair loss, and hormone problems. BMI is just one measure of your risk for weight-related health problems. You may be at higher risk for health problems if you are not active, you eat an unhealthy diet, or you drink too much alcohol or use tobacco products. Follow-up care is a key part of your treatment and safety. Be sure to make and go to all appointments, and call your doctor if you are having problems. It's also a good idea to know your test results and keep a list of the medicines you take. How can you care for yourself at home? · Practice healthy eating habits. This includes eating plenty of fruits, vegetables, whole grains, lean protein, and low-fat dairy. · If your doctor recommends it, get more exercise. Walking is a good choice. Bit by bit, increase the amount you walk every day. Try for at least 30 minutes on most days of the week. · Do not smoke. Smoking can increase your risk for health problems. If you need help quitting, talk to your doctor about stop-smoking programs and medicines. These can increase your chances of quitting for good. · Limit alcohol to 2 drinks a day for men and 1 drink a day for women. Too much alcohol can cause health problems. If you have a BMI higher than 25 · Your doctor may do other tests to check your risk for weight-related health problems. This may include measuring the distance around your waist. A waist measurement of more than 40 inches in men or 35 inches in women can increase the risk of weight-related health problems. · Talk with your doctor about steps you can take to stay healthy or improve your health. You may need to make lifestyle changes to lose weight and stay healthy, such as changing your diet and getting regular exercise. If you have a BMI lower than 18.5 · Your doctor may do other tests to check your risk for health problems.  
· Talk with your doctor about steps you can take to stay healthy or improve your health. You may need to make lifestyle changes to gain or maintain weight and stay healthy, such as getting more healthy foods in your diet and doing exercises to build muscle. Where can you learn more? Go to http://noé-jose.info/. Enter S176 in the search box to learn more about \"Body Mass Index: Care Instructions. \" Current as of: October 13, 2016 Content Version: 11.4 © 8662-0699 amprice. Care instructions adapted under license by [employer] (which disclaims liability or warranty for this information). If you have questions about a medical condition or this instruction, always ask your healthcare professional. [address] any warranty or liability for your use of this information. Introducing [facility]! Dear Kathryn Toussaint: Thank you for requesting a [employer] account. Our records indicate that you already have an active [employer] account. You can access your account anytime at [email or web address] Did you know that you can access your hospital and ER discharge instructions at any time in [employer]? You can also review all of your test results from your hospital stay or ER visit. Additional Information If you have questions, please visit the Frequently Asked Questions section of the [employer] website at [email or web address] Remember, [employer] is NOT to be used for urgent needs. For medical emergencies, dial 911. Now available from your iPhone and Android! Please provide this summary of care documentation to your next provider. Your primary care clinician is listed as Ruth Mccray. If you have any questions after today's visit, please call [phone].

## 2018-02-15 LAB
BUN SERPL-MCNC: 8 MG/DL (ref 6–24)
BUN/CREAT SERPL: 8 (ref 9–23)
CALCIUM SERPL-MCNC: 9.1 MG/DL (ref 8.7–10.2)
CHLORIDE SERPL-SCNC: 99 MMOL/L (ref 96–106)
CO2 SERPL-SCNC: 22 MMOL/L (ref 18–29)
CREAT SERPL-MCNC: 0.97 MG/DL (ref 0.57–1)
GFR SERPLBLD CREATININE-BSD FMLA CKD-EPI: 69 ML/MIN/1.73
GFR SERPLBLD CREATININE-BSD FMLA CKD-EPI: 80 ML/MIN/1.73
GLUCOSE SERPL-MCNC: 63 MG/DL (ref 65–99)
POTASSIUM SERPL-SCNC: 4 MMOL/L (ref 3.5–5.2)
SODIUM SERPL-SCNC: 139 MMOL/L (ref 134–144)

## 2018-10-06 DIAGNOSIS — E78.2 MIXED HYPERLIPIDEMIA: ICD-10-CM

## 2018-10-08 RX ORDER — ATORVASTATIN CALCIUM 40 MG/1
TABLET, FILM COATED ORAL
Qty: 90 TAB | Refills: 0 | Status: SHIPPED | OUTPATIENT
Start: 2018-10-08 | End: 2019-01-07 | Stop reason: SDUPTHER

## 2018-10-19 ENCOUNTER — HOSPITAL ENCOUNTER (OUTPATIENT)
Dept: MAMMOGRAPHY | Age: 48
Discharge: HOME | End: 2018-10-19
Attending: FAMILY MEDICINE

## 2018-10-19 DIAGNOSIS — Z12.31 VISIT FOR SCREENING MAMMOGRAM: ICD-10-CM

## 2018-10-26 ENCOUNTER — HOSPITAL ENCOUNTER (OUTPATIENT)
Dept: MAMMOGRAPHY | Age: 48
Discharge: HOME | End: 2018-10-26
Attending: FAMILY MEDICINE
Payer: COMMERCIAL

## 2018-10-26 PROCEDURE — 77067 SCR MAMMO BI INCL CAD: CPT

## 2018-11-05 NOTE — PROGRESS NOTES
IMPRESSION:   1. BI-RADS Assessment Category 1: Negative. 2. No mammographic evidence of malignancy.   Next screening mammogram is  recommended in one year

## 2018-11-28 ENCOUNTER — TELEPHONE (OUTPATIENT)
Dept: FAMILY MEDICINE CLINIC | Age: 48
End: 2018-11-28

## 2018-11-28 NOTE — TELEPHONE ENCOUNTER
----- Message from Cuong Lopez sent at 11/28/2018  9:32 AM EST -----  Regarding: Dr. Jassi Holley  Pt is requesting to come in for blood work asap per my chart, best contact [phone]

## 2018-12-17 ENCOUNTER — OFFICE VISIT (OUTPATIENT)
Dept: FAMILY MEDICINE CLINIC | Age: 48
End: 2018-12-17

## 2018-12-17 VITALS
HEART RATE: 79 BPM | DIASTOLIC BLOOD PRESSURE: 82 MMHG | OXYGEN SATURATION: 97 % | WEIGHT: 214 LBS | TEMPERATURE: 98 F | HEIGHT: 63 IN | RESPIRATION RATE: 18 BRPM | SYSTOLIC BLOOD PRESSURE: 135 MMHG | BODY MASS INDEX: 37.92 KG/M2

## 2018-12-17 DIAGNOSIS — G47.00 INSOMNIA, UNSPECIFIED TYPE: ICD-10-CM

## 2018-12-17 DIAGNOSIS — K92.1 BLOOD IN STOOL: ICD-10-CM

## 2018-12-17 DIAGNOSIS — E78.2 MIXED HYPERLIPIDEMIA: Primary | ICD-10-CM

## 2018-12-17 DIAGNOSIS — E66.9 OBESITY, CLASS II, BMI 35-39.9: ICD-10-CM

## 2018-12-17 DIAGNOSIS — Z12.11 COLON CANCER SCREENING: ICD-10-CM

## 2018-12-17 DIAGNOSIS — I10 ESSENTIAL HYPERTENSION: ICD-10-CM

## 2018-12-17 PROBLEM — E66.01 SEVERE OBESITY (HCC): Status: ACTIVE | Noted: 2018-12-17

## 2018-12-17 RX ORDER — HYDROCHLOROTHIAZIDE 12.5 MG/1
12.5 TABLET ORAL DAILY
Qty: 90 TAB | Refills: 1 | Status: SHIPPED | OUTPATIENT
Start: 2018-12-17 | End: 2019-05-28 | Stop reason: SDUPTHER

## 2018-12-17 RX ORDER — ZOLPIDEM TARTRATE 10 MG/1
10 TABLET ORAL
Qty: 15 TAB | Refills: 0 | Status: SHIPPED | OUTPATIENT
Start: 2018-12-17 | End: 2020-04-09

## 2018-12-17 NOTE — PATIENT INSTRUCTIONS
Body Mass Index: Care Instructions  Your Care Instructions    Body mass index (BMI) can help you see if your weight is raising your risk for health problems. It uses a formula to compare how much you weigh with how tall you are. · A BMI lower than 18.5 is considered underweight. · A BMI between 18.5 and 24.9 is considered healthy. · A BMI between 25 and 29.9 is considered overweight. A BMI of 30 or higher is considered obese. If your BMI is in the normal range, it means that you have a lower risk for weight-related health problems. If your BMI is in the overweight or obese range, you may be at increased risk for weight-related health problems, such as high blood pressure, heart disease, stroke, arthritis or joint pain, and diabetes. If your BMI is in the underweight range, you may be at increased risk for health problems such as fatigue, lower protection (immunity) against illness, muscle loss, bone loss, hair loss, and hormone problems. BMI is just one measure of your risk for weight-related health problems. You may be at higher risk for health problems if you are not active, you eat an unhealthy diet, or you drink too much alcohol or use tobacco products. Follow-up care is a key part of your treatment and safety. Be sure to make and go to all appointments, and call your doctor if you are having problems. It's also a good idea to know your test results and keep a list of the medicines you take. How can you care for yourself at home? · Practice healthy eating habits. This includes eating plenty of fruits, vegetables, whole grains, lean protein, and low-fat dairy. · If your doctor recommends it, get more exercise. Walking is a good choice. Bit by bit, increase the amount you walk every day. Try for at least 30 minutes on most days of the week. · Do not smoke. Smoking can increase your risk for health problems. If you need help quitting, talk to your doctor about stop-smoking programs and medicines. These can increase your chances of quitting for good. · Limit alcohol to 2 drinks a day for men and 1 drink a day for women. Too much alcohol can cause health problems. If you have a BMI higher than 25  · Your doctor may do other tests to check your risk for weight-related health problems. This may include measuring the distance around your waist. A waist measurement of more than 40 inches in men or 35 inches in women can increase the risk of weight-related health problems. · Talk with your doctor about steps you can take to stay healthy or improve your health. You may need to make lifestyle changes to lose weight and stay healthy, such as changing your diet and getting regular exercise. If you have a BMI lower than 18.5  · Your doctor may do other tests to check your risk for health problems. · Talk with your doctor about steps you can take to stay healthy or improve your health. You may need to make lifestyle changes to gain or maintain weight and stay healthy, such as getting more healthy foods in your diet and doing exercises to build muscle. Where can you learn more? Go to http://noé-jose.info/. Enter S176 in the search box to learn more about \"Body Mass Index: Care Instructions. \"  Current as of: October 13, 2016  Content Version: 11.4  © 0068-2316 Healthwise, Incorporated. Care instructions adapted under license by [employer] (which disclaims liability or warranty for this information). If you have questions about a medical condition or this instruction, always ask your healthcare professional. [address] any warranty or liability for your use of this information.

## 2018-12-17 NOTE — PROGRESS NOTES
Chief Complaint   Patient presents with    Hypertension     fasting follow up       1. Have you been to the ER, urgent care clinic since your last visit? Hospitalized since your last visit? No    2. Have you seen or consulted any other health care providers outside of the [address] [place] [address] since your last visit? Include any pap smears or colon screening.  No

## 2018-12-17 NOTE — PROGRESS NOTES
HISTORY OF PRESENT ILLNESS  Augusto Fatima is a 51 y.o. female who presents today for hypertension f/u. Blood pressure 135/82, pulse 79, temperature 98 °F (36.7 °C), temperature source Oral, resp. rate 18, height 5' 3\" (1.6 m), weight 214 lb (97.1 kg), last menstrual period 11/26/2018, SpO2 97 %. Body mass index is 37.91 kg/m². Chief Complaint   Patient presents with    Hypertension     fasting follow up      HPI  Hypertension: Bp at office today 135/82. Pt continues with hydrodiuril 12.5 mg daily. Pt reports that she hasn't been taking her bp medications in a while and her bp readings have been relatively \"low\" but low for her is sBp in the 130s. Advised that she should start taking her bp medications again since sBp is still 130. Blood in stool: Pt reports she's been having some blood in her stool. She states her father passed from colon cancer. She says that she's noticed it in the last month and thinks she may need to have colonoscopy done. Referral given for Dr. Emmanuelle Mcgill. Sleep disturbance: Pt reports that she still has trouble falling asleep. Advised she try melatonin 10 mg nightly and take Unisom 25 mg only as needed. Advised that I will prescribe her a small amount of Ambien 10 mg and to be taken only as needed and to cut the tablets in half. Overweight: Pt notes that she hasn't been exercising much but that she intends to start picking it up soon. Health Maintenance: Pt is fasting today for lab work. Current Outpatient Medications   Medication Sig Dispense Refill    hydroCHLOROthiazide (HYDRODIURIL) 12.5 mg tablet Take 1 Tab by mouth daily. 90 Tab 1    zolpidem (AMBIEN) 10 mg tablet Take 1 Tab by mouth nightly as needed for Sleep. Max Daily Amount: 10 mg. 15 Tab 0    atorvastatin (LIPITOR) 40 mg tablet TAKE 1 TABLET DAILY 90 Tab 0    TAYTULLA 1 mg-20 mcg (24)/75 mg (4) cap       naproxen (NAPROSYN) 500 mg tablet Take 1 Tab by mouth two (2) times daily (with meals).  30 Tab 3 Allergies   Allergen Reactions    Percocet [Oxycodone-Acetaminophen] Itching     Past Medical History:   Diagnosis Date    Hypercholesterolemia      Past Surgical History:   Procedure Laterality Date    HX CHOLECYSTECTOMY  2002         HX GYN      abnormal pap, fibroids     Family History   Problem Relation Age of Onset    Asthma Mother     Elevated Lipids Father     Hypertension Father     Cancer Father         anal cancer    Diabetes Paternal Aunt     Cancer Maternal Grandmother         colon    Cancer Paternal Grandmother         leukemia     Social History     Tobacco Use    Smoking status: Former Smoker     Last attempt to quit: 2013     Years since quittin.9    Smokeless tobacco: Never Used   Substance Use Topics    Alcohol use: Yes     Comment: occassional        Review of Systems   Constitutional: Negative. Negative for malaise/fatigue. Eyes: Negative for blurred vision. Respiratory: Negative for shortness of breath. Cardiovascular: Negative for chest pain and leg swelling. Gastrointestinal: Positive for blood in stool. Musculoskeletal: Negative. Neurological: Negative. Negative for dizziness and headaches. All other systems reviewed and are negative. Physical Exam   Constitutional: She is oriented to person, place, and time. She appears well-developed and well-nourished. No distress. HENT:   Head: Normocephalic and atraumatic. Neck: Carotid bruit is not present. Cardiovascular: Normal rate, regular rhythm, normal heart sounds and intact distal pulses. Exam reveals no gallop and no friction rub. No murmur heard. Pulmonary/Chest: Effort normal and breath sounds normal. No respiratory distress. She has no wheezes. She has no rales. Musculoskeletal: She exhibits no edema. Neurological: She is alert and oriented to person, place, and time. Skin: She is not diaphoretic. Psychiatric: She has a normal mood and affect.  Her behavior is normal. Judgment and thought content normal.   Nursing note and vitals reviewed. ASSESSMENT and PLAN  Diagnoses and all orders for this visit:    1. Mixed hyperlipidemia  -     METABOLIC PANEL, COMPREHENSIVE  -     LIPID PANEL  -     Stable. Advised to continue current regimen of Lipitor 40 mg daily. Will assess labs today. 2. Essential hypertension  -     CBC WITH AUTOMATED DIFF  -     METABOLIC PANEL, COMPREHENSIVE  -     hydroCHLOROthiazide (HYDRODIURIL) 12.5 mg tablet; Take 1 Tab by mouth daily.  -    Pt's bp in office is stable. She notes that she hasn't been taking her medication lately. Advised that she should resume hydrodiuril 12.5 mg daily; refill ordered. 3. Blood in stool        -     Pt reports blood in stool for the last month. She has family history of colon cancer. Referral given for Dr. Colton Tellez    4. Obesity, Class II, BMI 35-39.9        -     I have reviewed/discussed the above normal BMI with the patient. I have recommended the following interventions: dietary management education, guidance, and counseling, encourage exercise, monitor weight and prescribed dietary intake . 5. Colon cancer screening  -     Khushboo Cruz- Dr. Colton Tellez    6. Insomnia, unspecified type  -     zolpidem (AMBIEN) 10 mg tablet; Take 1 Tab by mouth nightly as needed for Sleep. Max Daily Amount: 10 mg.  -     Pt has trouble sleeping. Advised to use melatonin 10 mg nightly. Also prescribed Ambien 10 mg tablet but advised to cut tablets in half and use only as needed. Rx printed, signed, and given to pt. Follow-up Disposition:  Return in about 6 months (around 6/17/2019). Medication risks/benefits/costs/interactions/alternatives discussed with patient. Advised patient to call back or return to office if symptoms worsen/change/persist.  If patient cannot reach us or should anything more severe/urgent arise he/she should proceed directly to the nearest emergency department.   Discussed expected course/resolution/complications of diagnosis in detail with patient. Patient given a written after visit summary which includes their diagnoses, current medications and vitals. Patient expressed understanding with the diagnosis and plan. Written by fermín Solano, as dictated by Lashay Santana M.D.  10:52 AM - 11:07 AM    Total time spent with the patient 15 minutes, greater than 50% of time spent counseling patient.

## 2018-12-18 LAB
ALBUMIN SERPL-MCNC: 4.2 G/DL (ref 3.5–5.5)
ALBUMIN/GLOB SERPL: 1.4 {RATIO} (ref 1.2–2.2)
ALP SERPL-CCNC: 83 IU/L (ref 39–117)
ALT SERPL-CCNC: 11 IU/L (ref 0–32)
AST SERPL-CCNC: 13 IU/L (ref 0–40)
BASOPHILS # BLD AUTO: 0 X10E3/UL (ref 0–0.2)
BASOPHILS NFR BLD AUTO: 0 %
BILIRUB SERPL-MCNC: 0.3 MG/DL (ref 0–1.2)
BUN SERPL-MCNC: 10 MG/DL (ref 6–24)
BUN/CREAT SERPL: 10 (ref 9–23)
CALCIUM SERPL-MCNC: 9.2 MG/DL (ref 8.7–10.2)
CHLORIDE SERPL-SCNC: 104 MMOL/L (ref 96–106)
CHOLEST SERPL-MCNC: 148 MG/DL (ref 100–199)
CO2 SERPL-SCNC: 22 MMOL/L (ref 20–29)
CREAT SERPL-MCNC: 0.97 MG/DL (ref 0.57–1)
EOSINOPHIL # BLD AUTO: 0 X10E3/UL (ref 0–0.4)
EOSINOPHIL NFR BLD AUTO: 1 %
ERYTHROCYTE [DISTWIDTH] IN BLOOD BY AUTOMATED COUNT: 13 % (ref 12.3–15.4)
GLOBULIN SER CALC-MCNC: 3 G/DL (ref 1.5–4.5)
GLUCOSE SERPL-MCNC: 83 MG/DL (ref 65–99)
HCT VFR BLD AUTO: 39.4 % (ref 34–46.6)
HDLC SERPL-MCNC: 38 MG/DL
HGB BLD-MCNC: 12.9 G/DL (ref 11.1–15.9)
IMM GRANULOCYTES # BLD: 0 X10E3/UL (ref 0–0.1)
IMM GRANULOCYTES NFR BLD: 0 %
INTERPRETATION, 910389: NORMAL
LDLC SERPL CALC-MCNC: 89 MG/DL (ref 0–99)
LYMPHOCYTES # BLD AUTO: 4.1 X10E3/UL (ref 0.7–3.1)
LYMPHOCYTES NFR BLD AUTO: 51 %
MCH RBC QN AUTO: 30.6 PG (ref 26.6–33)
MCHC RBC AUTO-ENTMCNC: 32.7 G/DL (ref 31.5–35.7)
MCV RBC AUTO: 94 FL (ref 79–97)
MONOCYTES # BLD AUTO: 0.6 X10E3/UL (ref 0.1–0.9)
MONOCYTES NFR BLD AUTO: 8 %
NEUTROPHILS # BLD AUTO: 3.1 X10E3/UL (ref 1.4–7)
NEUTROPHILS NFR BLD AUTO: 40 %
PLATELET # BLD AUTO: 396 X10E3/UL (ref 150–379)
POTASSIUM SERPL-SCNC: 4.9 MMOL/L (ref 3.5–5.2)
PROT SERPL-MCNC: 7.2 G/DL (ref 6–8.5)
RBC # BLD AUTO: 4.21 X10E6/UL (ref 3.77–5.28)
SODIUM SERPL-SCNC: 141 MMOL/L (ref 134–144)
TRIGL SERPL-MCNC: 103 MG/DL (ref 0–149)
VLDLC SERPL CALC-MCNC: 21 MG/DL (ref 5–40)
WBC # BLD AUTO: 7.9 X10E3/UL (ref 3.4–10.8)

## 2019-01-06 NOTE — PROGRESS NOTES
Inform pt to go to my chart to see results and recommendations Labs look good Please work on diet and exercise HDL slightly decreased Rest of the labs are stable.

## 2019-07-07 DIAGNOSIS — E78.2 MIXED HYPERLIPIDEMIA: ICD-10-CM

## 2019-07-08 ENCOUNTER — OFFICE VISIT (OUTPATIENT)
Dept: FAMILY MEDICINE CLINIC | Age: 49
End: 2019-07-08

## 2019-07-08 VITALS
SYSTOLIC BLOOD PRESSURE: 136 MMHG | WEIGHT: 206 LBS | DIASTOLIC BLOOD PRESSURE: 81 MMHG | HEIGHT: 63 IN | RESPIRATION RATE: 16 BRPM | BODY MASS INDEX: 36.5 KG/M2 | HEART RATE: 83 BPM | TEMPERATURE: 98.4 F | OXYGEN SATURATION: 99 %

## 2019-07-08 DIAGNOSIS — E66.01 SEVERE OBESITY (HCC): ICD-10-CM

## 2019-07-08 DIAGNOSIS — Z12.11 COLON CANCER SCREENING: ICD-10-CM

## 2019-07-08 DIAGNOSIS — I10 ESSENTIAL HYPERTENSION: ICD-10-CM

## 2019-07-08 DIAGNOSIS — E78.2 MIXED HYPERLIPIDEMIA: Primary | ICD-10-CM

## 2019-07-08 DIAGNOSIS — F33.9 EPISODE OF RECURRENT MAJOR DEPRESSIVE DISORDER, UNSPECIFIED DEPRESSION EPISODE SEVERITY (HCC): ICD-10-CM

## 2019-07-08 RX ORDER — ATORVASTATIN CALCIUM 40 MG/1
TABLET, FILM COATED ORAL
Qty: 90 TAB | Refills: 1 | Status: SHIPPED | OUTPATIENT
Start: 2019-07-08 | End: 2020-01-13

## 2019-07-08 RX ORDER — ATORVASTATIN CALCIUM 40 MG/1
TABLET, FILM COATED ORAL
Qty: 90 TAB | Refills: 1 | Status: SHIPPED | OUTPATIENT
Start: 2019-07-08 | End: 2019-12-24

## 2019-07-08 NOTE — PROGRESS NOTES
REG Wen Remedies 51 y.o. female  presents to the office today for follow up on HTN and cholesterol. Blood pressure 136/81, pulse 83, temperature 98.4 °F (36.9 °C), temperature source Oral, resp. rate 16, height 5' 3\" (1.6 m), weight 206 lb (93.4 kg), last menstrual period 07/05/2019, SpO2 99 %. Body mass index is 36.49 kg/m². Chief Complaint   Patient presents with    Hypertension     follow up    Cholesterol Problem     follow up.  pt states she did have a cup of coffee this morning with sugar and cream.      Hypertension: BP at office today 136/81. Pt continues with HCTZ 12.5 mg/d. Pt admits that she has not been compliant with her BP medication regimen, but pt notes that her BP has been stable. I advised pt to continue monitoring her BP regularly at home. Hyperlipidemia: Lipid panel on 12/17/18 notable for total cholesterol 148, HDL 38, LDL 89, and triglycerides 103. Pt continues with Atorvastatin 40 mg/d. Obesity: I have reviewed/discussed the above normal BMI with the patient. Pt is currently taking Contrave 8-90 mg TbER ER tablet. Episode of recurrent major depressive disorder: Pt states that she has not been feeling depressed. Pt is recently engaged. Health Maintenance: Pt is due for colonoscopy and I will provide pt with referral to GI , and I advised pt to make appointment with Dr. Dario Quiroz as soon as possible.      Current Outpatient Medications   Medication Sig Dispense Refill    naltrexone-buPROPion (CONTRAVE) 8-90 mg TbER ER tablet First Month: Contrave 8mg/90mg #70  Week 1 : 1 Tab AM  Week 2 : 1 Tab AM, 1 Tab PM  Week 3 : 2 Tab AM, 1 Tab PM  Week 4 : 2 Tab AM, 2 Tab PM  Week 5 and Beyond: Contrave 8mg/90mg #120   2 Tab AM, 2 Tab  Tab 0    atorvastatin (LIPITOR) 40 mg tablet TAKE 1 TABLET DAILY (NEED OFFICE VISIT FOR FUTURE MEDICATION REFILL) 90 Tab 1    hydroCHLOROthiazide (HYDRODIURIL) 12.5 mg tablet TAKE 1 TABLET DAILY 90 Tab 0    zolpidem (AMBIEN) 10 mg tablet Take 1 Tab by mouth nightly as needed for Sleep. Max Daily Amount: 10 mg. 15 Tab 0    TAYTULLA 1 mg-20 mcg (24)/75 mg (4) cap       naproxen (NAPROSYN) 500 mg tablet Take 1 Tab by mouth two (2) times daily (with meals). 30 Tab 3     Allergies   Allergen Reactions    Percocet [Oxycodone-Acetaminophen] Itching     Past Medical History:   Diagnosis Date    Hypercholesterolemia      Past Surgical History:   Procedure Laterality Date    HX CHOLECYSTECTOMY  2002         HX GYN      abnormal pap, fibroids     Family History   Problem Relation Age of Onset    Asthma Mother     Elevated Lipids Father     Hypertension Father     Cancer Father         anal cancer    Diabetes Paternal Aunt     Cancer Maternal Grandmother         colon    Cancer Paternal Grandmother         leukemia     Social History     Tobacco Use    Smoking status: Former Smoker     Last attempt to quit: 2013     Years since quittin.4    Smokeless tobacco: Never Used   Substance Use Topics    Alcohol use: Yes     Comment: occassional        Review of Systems   Constitutional: Negative for chills and fever. HENT: Negative for hearing loss and tinnitus. Eyes: Negative for blurred vision and double vision. Respiratory: Negative for cough and shortness of breath. Cardiovascular: Negative for chest pain and palpitations. Gastrointestinal: Negative for nausea and vomiting. Genitourinary: Negative for dysuria and frequency. Musculoskeletal: Negative for back pain and falls. Skin: Negative for itching and rash. Neurological: Negative for dizziness, loss of consciousness and headaches. Psychiatric/Behavioral: Negative for depression. The patient is not nervous/anxious. Physical Exam   Constitutional: She is oriented to person, place, and time. She appears well-developed and well-nourished. HENT:   Head: Normocephalic and atraumatic.    Right Ear: External ear normal.   Left Ear: External ear normal. Nose: Nose normal.   Mouth/Throat: Oropharynx is clear and moist.   Eyes: Conjunctivae and EOM are normal.   Neck: Normal range of motion. Neck supple. Cardiovascular: Normal rate, regular rhythm, normal heart sounds and intact distal pulses. Pulmonary/Chest: Effort normal and breath sounds normal.   Abdominal: Soft. Bowel sounds are normal.   Musculoskeletal: Normal range of motion. Neurological: She is alert and oriented to person, place, and time. Skin: Skin is warm and dry. Psychiatric: She has a normal mood and affect. Her behavior is normal. Judgment and thought content normal.   Nursing note and vitals reviewed. ASSESSMENT and PLAN  Diagnoses and all orders for this visit:    1. Mixed hyperlipidemia  Presumed stable, will assess levels today. Advised pt to continue with Atorvastatin 40 mg/d.  -     atorvastatin (LIPITOR) 40 mg tablet; TAKE 1 TABLET DAILY (NEED OFFICE VISIT FOR FUTURE MEDICATION REFILL)    2. Essential hypertension  BP is at goal today in office. Advised pt to continue with HCTZ 12.5 mg/d. 3. Severe obesity ([address]  I have reviewed/discussed the above normal BMI with the patient. I have recommended the following interventions: dietary management education, guidance, and counseling, encourage exercise and monitor weight . Advised pt to continue with Contrave 8-90 mg TbER ER tablet. -     naltrexone-buPROPion (CONTRAVE) 8-90 mg TbER ER tablet; First Month: Contrave 8mg/90mg #70  Week 1 : 1 Tab AM  Week 2 : 1 Tab AM, 1 Tab PM  Week 3 : 2 Tab AM, 1 Tab PM  Week 4 : 2 Tab AM, 2 Tab PM  Week 5 and Beyond: Contrave 8mg/90mg #120   2 Tab AM, 2 Tab PM    4. Episode of recurrent major depressive disorder, unspecified depression episode severity ([address]  Condition under control. Will continue to monitor.      5. Colon cancer screening  Provided pt with referral to GI.   -     REFERRAL TO GASTROENTEROLOGY    Follow-up and Dispositions    · Return in about 6 months (around 1/8/2020) for hyperlipidemia follow up, hypertension follow up. Medication risks/benefits/costs/interactions/alternatives discussed with patient. Advised patient to call back or return to office if symptoms worsen/change/persist.  If patient cannot reach us or should anything more severe/urgent arise he/she should proceed directly to the nearest emergency department. Discussed expected course/resolution/complications of diagnosis in detail with patient. Patient given a written after visit summary which includes her diagnoses, current medications and vitals. Patient expressed understanding with the diagnosis and plan. Written by fermín Pro, as dictated by Hitesh Larry M.D.    12:09 PM - 12:19 PM    Total time spent with the patient 10 minutes, greater than 50% of time spent counseling patient.

## 2019-07-08 NOTE — PATIENT INSTRUCTIONS
Body Mass Index: Care Instructions Your Care Instructions Body mass index (BMI) can help you see if your weight is raising your risk for health problems. It uses a formula to compare how much you weigh with how tall you are. · A BMI lower than 18.5 is considered underweight. · A BMI between 18.5 and 24.9 is considered healthy. · A BMI between 25 and 29.9 is considered overweight. A BMI of 30 or higher is considered obese. If your BMI is in the normal range, it means that you have a lower risk for weight-related health problems. If your BMI is in the overweight or obese range, you may be at increased risk for weight-related health problems, such as high blood pressure, heart disease, stroke, arthritis or joint pain, and diabetes. If your BMI is in the underweight range, you may be at increased risk for health problems such as fatigue, lower protection (immunity) against illness, muscle loss, bone loss, hair loss, and hormone problems. BMI is just one measure of your risk for weight-related health problems. You may be at higher risk for health problems if you are not active, you eat an unhealthy diet, or you drink too much alcohol or use tobacco products. Follow-up care is a key part of your treatment and safety. Be sure to make and go to all appointments, and call your doctor if you are having problems. It's also a good idea to know your test results and keep a list of the medicines you take. How can you care for yourself at home? · Practice healthy eating habits. This includes eating plenty of fruits, vegetables, whole grains, lean protein, and low-fat dairy. · If your doctor recommends it, get more exercise. Walking is a good choice. Bit by bit, increase the amount you walk every day. Try for at least 30 minutes on most days of the week. · Do not smoke. Smoking can increase your risk for health problems.  If you need help quitting, talk to your doctor about stop-smoking programs and medicines. These can increase your chances of quitting for good. · Limit alcohol to 2 drinks a day for men and 1 drink a day for women. Too much alcohol can cause health problems. If you have a BMI higher than 25 · Your doctor may do other tests to check your risk for weight-related health problems. This may include measuring the distance around your waist. A waist measurement of more than 40 inches in men or 35 inches in women can increase the risk of weight-related health problems. · Talk with your doctor about steps you can take to stay healthy or improve your health. You may need to make lifestyle changes to lose weight and stay healthy, such as changing your diet and getting regular exercise. If you have a BMI lower than 18.5 · Your doctor may do other tests to check your risk for health problems. · Talk with your doctor about steps you can take to stay healthy or improve your health. You may need to make lifestyle changes to gain or maintain weight and stay healthy, such as getting more healthy foods in your diet and doing exercises to build muscle. Where can you learn more? Go to http://noé-jose.info/. Enter S176 in the search box to learn more about \"Body Mass Index: Care Instructions. \" Current as of: June 25, 2018 Content Version: 11.9 © 2478-1391 [employer]. Care instructions adapted under license by [employer] (which disclaims liability or warranty for this information). If you have questions about a medical condition or this instruction, always ask your healthcare professional. [address] any warranty or liability for your use of this information.

## 2019-07-09 LAB
ALBUMIN SERPL-MCNC: 4.2 G/DL (ref 3.5–5.5)
ALBUMIN/GLOB SERPL: 1.3 {RATIO} (ref 1.2–2.2)
ALP SERPL-CCNC: 87 IU/L (ref 39–117)
ALT SERPL-CCNC: 13 IU/L (ref 0–32)
AST SERPL-CCNC: 18 IU/L (ref 0–40)
BILIRUB SERPL-MCNC: 0.3 MG/DL (ref 0–1.2)
BUN SERPL-MCNC: 11 MG/DL (ref 6–24)
BUN/CREAT SERPL: 10 (ref 9–23)
CALCIUM SERPL-MCNC: 9.1 MG/DL (ref 8.7–10.2)
CHLORIDE SERPL-SCNC: 104 MMOL/L (ref 96–106)
CHOLEST SERPL-MCNC: 151 MG/DL (ref 100–199)
CO2 SERPL-SCNC: 22 MMOL/L (ref 20–29)
CREAT SERPL-MCNC: 1.12 MG/DL (ref 0.57–1)
GLOBULIN SER CALC-MCNC: 3.2 G/DL (ref 1.5–4.5)
GLUCOSE SERPL-MCNC: 79 MG/DL (ref 65–99)
HDLC SERPL-MCNC: 37 MG/DL
INTERPRETATION, 910389: NORMAL
INTERPRETATION: NORMAL
LDLC SERPL CALC-MCNC: 94 MG/DL (ref 0–99)
PDF IMAGE, 910387: NORMAL
POTASSIUM SERPL-SCNC: 4.3 MMOL/L (ref 3.5–5.2)
PROT SERPL-MCNC: 7.4 G/DL (ref 6–8.5)
SODIUM SERPL-SCNC: 141 MMOL/L (ref 134–144)
TRIGL SERPL-MCNC: 98 MG/DL (ref 0–149)
VLDLC SERPL CALC-MCNC: 20 MG/DL (ref 5–40)

## 2019-07-21 NOTE — PROGRESS NOTES
Recheck bmp in 2 weeks ask pt to be well hydrated  Dx elevated serum creatinine.     Cholesterol is stable---recheck in 6 months

## 2019-07-24 DIAGNOSIS — R79.89 ELEVATED SERUM CREATININE: Primary | ICD-10-CM

## 2019-07-24 NOTE — PROGRESS NOTES
[phone] attempted to call patient no answer left message on her VM labs in my chart she can see results and Dr Breanne Lee recommendation and if she has question to call me back   Per Dr Breanne Lee ok to order [facility]

## 2019-08-26 DIAGNOSIS — I10 ESSENTIAL HYPERTENSION: ICD-10-CM

## 2019-08-26 RX ORDER — HYDROCHLOROTHIAZIDE 12.5 MG/1
TABLET ORAL
Qty: 90 TAB | Refills: 1 | Status: SHIPPED | OUTPATIENT
Start: 2019-08-26 | End: 2020-01-13

## 2019-12-24 DIAGNOSIS — E78.2 MIXED HYPERLIPIDEMIA: ICD-10-CM

## 2019-12-24 RX ORDER — ATORVASTATIN CALCIUM 40 MG/1
TABLET, FILM COATED ORAL
Qty: 90 TAB | Refills: 1 | Status: SHIPPED | OUTPATIENT
Start: 2019-12-24 | End: 2020-05-23

## 2020-01-13 ENCOUNTER — OFFICE VISIT (OUTPATIENT)
Dept: FAMILY MEDICINE CLINIC | Age: 50
End: 2020-01-13

## 2020-01-13 VITALS
OXYGEN SATURATION: 95 % | BODY MASS INDEX: 36.82 KG/M2 | HEIGHT: 63 IN | WEIGHT: 207.8 LBS | HEART RATE: 52 BPM | DIASTOLIC BLOOD PRESSURE: 89 MMHG | RESPIRATION RATE: 18 BRPM | SYSTOLIC BLOOD PRESSURE: 139 MMHG | TEMPERATURE: 98 F

## 2020-01-13 DIAGNOSIS — I10 ESSENTIAL HYPERTENSION: Primary | ICD-10-CM

## 2020-01-13 DIAGNOSIS — M25.512 CHRONIC LEFT SHOULDER PAIN: ICD-10-CM

## 2020-01-13 DIAGNOSIS — M67.912 ROTATOR CUFF DISORDER, LEFT: ICD-10-CM

## 2020-01-13 DIAGNOSIS — E78.2 MIXED HYPERLIPIDEMIA: ICD-10-CM

## 2020-01-13 DIAGNOSIS — E66.01 SEVERE OBESITY (HCC): ICD-10-CM

## 2020-01-13 DIAGNOSIS — G89.29 CHRONIC LEFT SHOULDER PAIN: ICD-10-CM

## 2020-01-13 RX ORDER — HYDROCHLOROTHIAZIDE 12.5 MG/1
TABLET ORAL
Qty: 90 TAB | Refills: 1 | Status: SHIPPED | OUTPATIENT
Start: 2020-01-13 | End: 2022-02-21 | Stop reason: SDUPTHER

## 2020-01-13 RX ORDER — ATORVASTATIN CALCIUM 40 MG/1
TABLET, FILM COATED ORAL
Qty: 90 TAB | Refills: 1 | Status: SHIPPED | OUTPATIENT
Start: 2020-01-13 | End: 2020-04-09

## 2020-01-13 NOTE — PATIENT INSTRUCTIONS
Learning About High Cholesterol  What is high cholesterol? Cholesterol is a type of fat in your blood. It is needed for many body functions, such as making new cells. Cholesterol is made by your body. It also comes from food you eat. If you have too much cholesterol, it starts to build up in your arteries. This is called hardening of the arteries, or atherosclerosis. High cholesterol raises your risk of a heart attack and stroke. There are different types of cholesterol. LDL is the \"bad\" cholesterol. High LDL can raise your risk for heart disease, heart attack, and stroke. HDL is the \"good\" cholesterol. High HDL is linked with a lower risk for heart disease, heart attack, and stroke. Your cholesterol levels help your doctor find out your risk for having a heart attack or stroke. How can you prevent high cholesterol? A heart-healthy lifestyle can help you prevent high cholesterol. This lifestyle helps lower your risk for a heart attack and stroke. · Eat heart-healthy foods. ? Eat fruits, vegetables, whole grains (like oatmeal), dried beans and peas, nuts and seeds, soy products (like tofu), and fat-free or low-fat dairy products. ? Replace butter, margarine, and hydrogenated or partially hydrogenated oils with olive and canola oils. (Canola oil margarine without trans fat is fine.)  ? Replace red meat with fish, poultry, and soy protein (like tofu). ? Limit processed and packaged foods like chips, crackers, and cookies. · Be active. Exercise can improve your cholesterol level. Get at least 30 minutes of exercise on most days of the week. Walking is a good choice. You also may want to do other activities, such as running, swimming, cycling, or playing tennis or team sports. · Stay at a healthy weight. Lose weight if you need to. · Don't smoke. If you need help quitting, talk to your doctor about stop-smoking programs and medicines. These can increase your chances of quitting for good.   How is high cholesterol treated? The goal of treatment is to reduce your chances of having a heart attack or stroke. The goal is not to lower your cholesterol numbers only. · You may make lifestyle changes, such as eating healthy foods, not smoking, losing weight, and being more active. · You may have to take medicine. Follow-up care is a key part of your treatment and safety. Be sure to make and go to all appointments, and call your doctor if you are having problems. It's also a good idea to know your test results and keep a list of the medicines you take. Where can you learn more? Go to http://noé-jose.info/. Enter P351 in the search box to learn more about \"Learning About High Cholesterol. \"  Current as of: April 9, 2019  Content Version: 12.2  © 5103-0833 [employer]. Care instructions adapted under license by [employer] (which disclaims liability or warranty for this information). If you have questions about a medical condition or this instruction, always ask your healthcare professional. [address] any warranty or liability for your use of this information.

## 2020-01-13 NOTE — PROGRESS NOTES
Chief Complaint   Patient presents with    Cholesterol Problem     6 month follow up    Shoulder Pain     left        1. Have you been to the ER, urgent care clinic since your last visit? Hospitalized since your last visit? No    2. Have you seen or consulted any other health care providers outside of the [facility] since your last visit? Include any pap smears or colon screening.  No

## 2020-01-13 NOTE — PROGRESS NOTES
HPI  Aki Duckworth 47 y.o. female  presents to the office today for follow up on chronic conditions. Blood pressure 139/89, pulse (!) 52, temperature 98 °F (36.7 °C), temperature source Oral, resp. rate 18, height 5' 3\" (1.6 m), weight 207 lb 12.8 oz (94.3 kg), last menstrual period 12/29/2019, SpO2 95 %. Body mass index is 36.81 kg/m². Chief Complaint   Patient presents with    Cholesterol Problem     6 month follow up    Shoulder Pain     left       Hyperlipidemia: Lipid panel on 7/8/19 notable for total cholesterol 151, HDL 37, LDL 94, and triglycerides 98. Pt continues with Atorvastatin 40 mg/d. Hypertension: BP at office today 145/87 and 139/89 on manual recheck. Pt is currently on HCTZ 12.5 mg, but she admits that she has not been compliant with her medication regimen due to increased urinary frequency. Obesity: I have reviewed/discussed the above normal BMI with the patient. Pt reports a fall in the shower years ago. Recently during the holidays, pt started experiencing left shoulder pain. Pt had to apply heating pad to relieve pain. States that the pain gradually got better. Denies pain today in office. Current Outpatient Medications   Medication Sig Dispense Refill    hydroCHLOROthiazide (HYDRODIURIL) 12.5 mg tablet TAKE 1 TABLET DAILY 90 Tab 1    atorvastatin (LIPITOR) 40 mg tablet TAKE 1 TABLET DAILY (NEED OFFICE VISIT FOR FUTURE MEDICATION REFILL) 90 Tab 1    naltrexone-buPROPion (CONTRAVE) 8-90 mg TbER ER tablet First Month: Contrave 8mg/90mg #70  Week 1 : 1 Tab AM  Week 2 : 1 Tab AM, 1 Tab PM  Week 3 : 2 Tab AM, 1 Tab PM  Week 4 : 2 Tab AM, 2 Tab PM  Week 5 and Beyond: Contrave 8mg/90mg #120   2 Tab AM, 2 Tab  Tab 0    zolpidem (AMBIEN) 10 mg tablet Take 1 Tab by mouth nightly as needed for Sleep. Max Daily Amount: 10 mg. 15 Tab 0    TAYTULLA 1 mg-20 mcg (24)/75 mg (4) cap       naproxen (NAPROSYN) 500 mg tablet Take 1 Tab by mouth two (2) times daily (with meals). 30 Tab 3    atorvastatin (LIPITOR) 40 mg tablet TAKE 1 TABLET DAILY (NEED  OFFICE VISIT FOR FUTURE    MEDICATION REFILL) 90 Tab 1     Allergies   Allergen Reactions    Percocet [Oxycodone-Acetaminophen] Itching     Past Medical History:   Diagnosis Date    Hypercholesterolemia      Past Surgical History:   Procedure Laterality Date    HX CHOLECYSTECTOMY  2002         HX GYN      abnormal pap, fibroids     Family History   Problem Relation Age of Onset    Asthma Mother     Elevated Lipids Father     Hypertension Father     Cancer Father         anal cancer    Diabetes Paternal Aunt     Cancer Maternal Grandmother         colon    Cancer Paternal Grandmother         leukemia     Social History     Tobacco Use    Smoking status: Former Smoker     Last attempt to quit: 2013     Years since quittin.0    Smokeless tobacco: Never Used   Substance Use Topics    Alcohol use: Yes     Comment: occassional        Review of Systems   Constitutional: Negative for chills and fever. HENT: Negative for hearing loss and tinnitus. Eyes: Negative for blurred vision and double vision. Respiratory: Negative for cough and shortness of breath. Cardiovascular: Negative for chest pain and palpitations. Gastrointestinal: Negative for nausea and vomiting. Genitourinary: Negative for dysuria and frequency. Musculoskeletal: Negative for back pain and falls. Skin: Negative for itching and rash. Neurological: Negative for dizziness, loss of consciousness and headaches. Psychiatric/Behavioral: Negative for depression. The patient is not nervous/anxious. Physical Exam  Vitals signs and nursing note reviewed. Constitutional:       Appearance: Normal appearance. She is well-developed. HENT:      Head: Normocephalic and atraumatic.       Right Ear: External ear normal.      Left Ear: External ear normal.      Nose: Nose normal.   Eyes:      Conjunctiva/sclera: Conjunctivae normal.      Pupils: Pupils are equal, round, and reactive to light. Neck:      Musculoskeletal: Normal range of motion and neck supple. Cardiovascular:      Rate and Rhythm: Normal rate and regular rhythm. Pulses: Normal pulses. Heart sounds: Normal heart sounds. Pulmonary:      Effort: Pulmonary effort is normal.      Breath sounds: Normal breath sounds. Abdominal:      General: Bowel sounds are normal.      Palpations: Abdomen is soft. Musculoskeletal: Normal range of motion. Left shoulder: She exhibits pain. Skin:     General: Skin is warm and dry. Neurological:      Mental Status: She is alert and oriented to person, place, and time. Psychiatric:         Speech: Speech normal.         Behavior: Behavior normal.         Thought Content: Thought content normal.         Judgment: Judgment normal.           ASSESSMENT and PLAN  Diagnoses and all orders for this visit:    1. Essential hypertension  BP is at goal today in office. Advised pt to continue with HCTZ 12.5 mg/d. -     METABOLIC PANEL, COMPREHENSIVE  -     hydroCHLOROthiazide (HYDRODIURIL) 12.5 mg tablet; TAKE 1 TABLET DAILY    2. Mixed hyperlipidemia  Presumed stable, will assess levels today. Pt continues with Atorvastatin 40 mg/d. -     METABOLIC PANEL, COMPREHENSIVE  -     LIPID PANEL  -     atorvastatin (LIPITOR) 40 mg tablet; TAKE 1 TABLET DAILY (NEED OFFICE VISIT FOR FUTURE MEDICATION REFILL)    3. Severe obesity ([address]  I have reviewed/discussed the above normal BMI with the patient. I have recommended the following interventions: dietary management education, guidance, and counseling, encourage exercise and monitor weight .   -     naltrexone-buPROPion (CONTRAVE) 8-90 mg TbER ER tablet; First Month: Contrave 8mg/90mg #70  Week 1 : 1 Tab AM  Week 2 : 1 Tab AM, 1 Tab PM  Week 3 : 2 Tab AM, 1 Tab PM  Week 4 : 2 Tab AM, 2 Tab PM  Week 5 and Beyond: Contrave 8mg/90mg #120   2 Tab AM, 2 Tab PM    4.  Chronic left shoulder pain  Discussed with pt that she would benefit from seeing sports medicine Dr. Heidy De León for further evaluation.  -     REFERRAL TO SPORTS MEDICINE    5. Rotator cuff disorder, left  Discussed with pt that she would benefit from seeing sports medicine Dr. Heidy De León for further evaluation.  -     REFERRAL TO SPORTS MEDICINE    Follow-up and Dispositions    · Return in about 6 months (around 7/13/2020) for hyperlipidemia follow up, hypertension follow up. Medication risks/benefits/costs/interactions/alternatives discussed with patient. Advised patient to call back or return to office if symptoms worsen/change/persist.  If patient cannot reach us or should anything more severe/urgent arise he/she should proceed directly to the nearest emergency department. Discussed expected course/resolution/complications of diagnosis in detail with patient. Patient given a written after visit summary which includes diagnoses, current medications and vitals. Patient expressed understanding with the diagnosis and plan. Written by fermín Hedrick, as dictated by Gregory Perry M.D.    Amira July PM - 6:25 PM    Total time spent with the patient 12 minutes, greater than 50% of time spent counseling patient.

## 2020-01-31 ENCOUNTER — PATIENT MESSAGE (OUTPATIENT)
Dept: FAMILY MEDICINE CLINIC | Age: 50
End: 2020-01-31

## 2020-02-11 LAB
ALBUMIN SERPL-MCNC: 4.1 G/DL (ref 3.8–4.8)
ALBUMIN/GLOB SERPL: 1.3 {RATIO} (ref 1.2–2.2)
ALP SERPL-CCNC: 83 IU/L (ref 39–117)
ALT SERPL-CCNC: 7 IU/L (ref 0–32)
AST SERPL-CCNC: 14 IU/L (ref 0–40)
BILIRUB SERPL-MCNC: 0.3 MG/DL (ref 0–1.2)
BUN SERPL-MCNC: 10 MG/DL (ref 6–24)
BUN/CREAT SERPL: 9 (ref 9–23)
CALCIUM SERPL-MCNC: 8.8 MG/DL (ref 8.7–10.2)
CHLORIDE SERPL-SCNC: 105 MMOL/L (ref 96–106)
CHOLEST SERPL-MCNC: 152 MG/DL (ref 100–199)
CO2 SERPL-SCNC: 25 MMOL/L (ref 20–29)
CREAT SERPL-MCNC: 1.12 MG/DL (ref 0.57–1)
GLOBULIN SER CALC-MCNC: 3.1 G/DL (ref 1.5–4.5)
GLUCOSE SERPL-MCNC: 106 MG/DL (ref 65–99)
HDLC SERPL-MCNC: 36 MG/DL
INTERPRETATION, 910389: NORMAL
INTERPRETATION: NORMAL
LDLC SERPL CALC-MCNC: 98 MG/DL (ref 0–99)
PDF IMAGE, 910387: NORMAL
POTASSIUM SERPL-SCNC: 4.1 MMOL/L (ref 3.5–5.2)
PROT SERPL-MCNC: 7.2 G/DL (ref 6–8.5)
SODIUM SERPL-SCNC: 141 MMOL/L (ref 134–144)
TRIGL SERPL-MCNC: 92 MG/DL (ref 0–149)
VLDLC SERPL CALC-MCNC: 18 MG/DL (ref 5–40)

## 2020-04-01 ENCOUNTER — APPOINTMENT (OUTPATIENT)
Dept: ULTRASOUND IMAGING | Age: 50
End: 2020-04-01
Attending: INTERNAL MEDICINE
Payer: COMMERCIAL

## 2020-04-01 ENCOUNTER — ANESTHESIA (OUTPATIENT)
Dept: ENDOSCOPY | Age: 50
End: 2020-04-01
Payer: COMMERCIAL

## 2020-04-01 ENCOUNTER — HOSPITAL ENCOUNTER (OUTPATIENT)
Age: 50
Setting detail: OUTPATIENT SURGERY
Discharge: HOME | End: 2020-04-01
Attending: INTERNAL MEDICINE | Admitting: INTERNAL MEDICINE
Payer: COMMERCIAL

## 2020-04-01 ENCOUNTER — ANESTHESIA EVENT (OUTPATIENT)
Dept: ENDOSCOPY | Age: 50
End: 2020-04-01
Payer: COMMERCIAL

## 2020-04-01 VITALS
HEART RATE: 72 BPM | RESPIRATION RATE: 16 BRPM | WEIGHT: 200 LBS | SYSTOLIC BLOOD PRESSURE: 138 MMHG | BODY MASS INDEX: 35.44 KG/M2 | HEIGHT: 63 IN | DIASTOLIC BLOOD PRESSURE: 84 MMHG | TEMPERATURE: 98.2 F | OXYGEN SATURATION: 100 %

## 2020-04-01 LAB — HCG UR QL: NEGATIVE

## 2020-04-01 PROCEDURE — 74011250636 HC RX REV CODE- 250/636: Performed by: INTERNAL MEDICINE

## 2020-04-01 PROCEDURE — 74011250636 HC RX REV CODE- 250/636: Performed by: NURSE ANESTHETIST, CERTIFIED REGISTERED

## 2020-04-01 PROCEDURE — 76060000032 HC ANESTHESIA 0.5 TO 1 HR: Performed by: INTERNAL MEDICINE

## 2020-04-01 PROCEDURE — 81025 URINE PREGNANCY TEST: CPT

## 2020-04-01 PROCEDURE — 88305 TISSUE EXAM BY PATHOLOGIST: CPT

## 2020-04-01 PROCEDURE — 74011000250 HC RX REV CODE- 250: Performed by: NURSE ANESTHETIST, CERTIFIED REGISTERED

## 2020-04-01 PROCEDURE — 77030019957 HC CUF BLN GASTSCP OCOA -B: Performed by: INTERNAL MEDICINE

## 2020-04-01 PROCEDURE — 77030021593 HC FCPS BIOP ENDOSC BSC -A: Performed by: INTERNAL MEDICINE

## 2020-04-01 PROCEDURE — 76040000007: Performed by: INTERNAL MEDICINE

## 2020-04-01 RX ORDER — SODIUM CHLORIDE 0.9 % (FLUSH) 0.9 %
5-40 SYRINGE (ML) INJECTION AS NEEDED
Status: DISCONTINUED | OUTPATIENT
Start: 2020-04-01 | End: 2020-04-01 | Stop reason: HOSPADM

## 2020-04-01 RX ORDER — PROPOFOL 10 MG/ML
INJECTION, EMULSION INTRAVENOUS AS NEEDED
Status: DISCONTINUED | OUTPATIENT
Start: 2020-04-01 | End: 2020-04-01 | Stop reason: HOSPADM

## 2020-04-01 RX ORDER — FLUMAZENIL 0.1 MG/ML
0.2 INJECTION INTRAVENOUS
Status: DISCONTINUED | OUTPATIENT
Start: 2020-04-01 | End: 2020-04-01 | Stop reason: HOSPADM

## 2020-04-01 RX ORDER — SODIUM CHLORIDE 9 MG/ML
INJECTION, SOLUTION INTRAVENOUS
Status: DISCONTINUED | OUTPATIENT
Start: 2020-04-01 | End: 2020-04-01 | Stop reason: HOSPADM

## 2020-04-01 RX ORDER — NALOXONE HYDROCHLORIDE 0.4 MG/ML
0.4 INJECTION, SOLUTION INTRAMUSCULAR; INTRAVENOUS; SUBCUTANEOUS
Status: DISCONTINUED | OUTPATIENT
Start: 2020-04-01 | End: 2020-04-01 | Stop reason: HOSPADM

## 2020-04-01 RX ORDER — LIDOCAINE HYDROCHLORIDE 20 MG/ML
INJECTION, SOLUTION EPIDURAL; INFILTRATION; INTRACAUDAL; PERINEURAL AS NEEDED
Status: DISCONTINUED | OUTPATIENT
Start: 2020-04-01 | End: 2020-04-01 | Stop reason: HOSPADM

## 2020-04-01 RX ORDER — SODIUM CHLORIDE 9 MG/ML
50 INJECTION, SOLUTION INTRAVENOUS CONTINUOUS
Status: DISCONTINUED | OUTPATIENT
Start: 2020-04-01 | End: 2020-04-01 | Stop reason: HOSPADM

## 2020-04-01 RX ORDER — ATROPINE SULFATE 0.1 MG/ML
0.5 INJECTION INTRAVENOUS
Status: DISCONTINUED | OUTPATIENT
Start: 2020-04-01 | End: 2020-04-01 | Stop reason: HOSPADM

## 2020-04-01 RX ORDER — EPINEPHRINE 0.1 MG/ML
1 INJECTION INTRACARDIAC; INTRAVENOUS
Status: DISCONTINUED | OUTPATIENT
Start: 2020-04-01 | End: 2020-04-01 | Stop reason: HOSPADM

## 2020-04-01 RX ORDER — DEXTROMETHORPHAN/PSEUDOEPHED 2.5-7.5/.8
1.2 DROPS ORAL
Status: DISCONTINUED | OUTPATIENT
Start: 2020-04-01 | End: 2020-04-01 | Stop reason: HOSPADM

## 2020-04-01 RX ADMIN — PROPOFOL 20 MG: 10 INJECTION, EMULSION INTRAVENOUS at 09:41

## 2020-04-01 RX ADMIN — PROPOFOL 20 MG: 10 INJECTION, EMULSION INTRAVENOUS at 09:51

## 2020-04-01 RX ADMIN — SODIUM CHLORIDE: 900 INJECTION, SOLUTION INTRAVENOUS at 09:09

## 2020-04-01 RX ADMIN — PROPOFOL 20 MG: 10 INJECTION, EMULSION INTRAVENOUS at 09:42

## 2020-04-01 RX ADMIN — PROPOFOL 20 MG: 10 INJECTION, EMULSION INTRAVENOUS at 09:44

## 2020-04-01 RX ADMIN — LIDOCAINE HYDROCHLORIDE 60 MG: 20 INJECTION, SOLUTION EPIDURAL; INFILTRATION; INTRACAUDAL; PERINEURAL at 09:37

## 2020-04-01 RX ADMIN — PROPOFOL 20 MG: 10 INJECTION, EMULSION INTRAVENOUS at 09:40

## 2020-04-01 RX ADMIN — PROPOFOL 100 MG: 10 INJECTION, EMULSION INTRAVENOUS at 09:37

## 2020-04-01 RX ADMIN — PROPOFOL 30 MG: 10 INJECTION, EMULSION INTRAVENOUS at 09:47

## 2020-04-01 RX ADMIN — PROPOFOL 20 MG: 10 INJECTION, EMULSION INTRAVENOUS at 09:38

## 2020-04-01 RX ADMIN — PROPOFOL 30 MG: 10 INJECTION, EMULSION INTRAVENOUS at 09:49

## 2020-04-01 RX ADMIN — PROPOFOL 20 MG: 10 INJECTION, EMULSION INTRAVENOUS at 09:39

## 2020-04-01 NOTE — ANESTHESIA PREPROCEDURE EVALUATION
Relevant Problems   No relevant active problems       Anesthetic History   No history of anesthetic complications            Review of Systems / Medical History  Patient summary reviewed, nursing notes reviewed and pertinent labs reviewed    Pulmonary  Within defined limits                 Neuro/Psych         Psychiatric history     Cardiovascular    Hypertension: well controlled              Exercise tolerance: >4 METS     GI/Hepatic/Renal                Endo/Other        Obesity     Other Findings              Physical Exam    Airway  Mallampati: II  TM Distance: > 6 cm  Neck ROM: normal range of motion   Mouth opening: Normal     Cardiovascular    Rhythm: regular  Rate: normal         Dental  No notable dental hx       Pulmonary  Breath sounds clear to auscultation               Abdominal         Other Findings            Anesthetic Plan    ASA: 2  Anesthesia type: MAC          Induction: Intravenous  Anesthetic plan and risks discussed with: Patient

## 2020-04-01 NOTE — PROCEDURES
[address]  [address] [address] [address], [address]  [phone]                                 Flexible sigmoidoscopy with rectal endoscopic ultrasound     NAME:  Leonila Martin   :   1970   MRN:   [id number]       Date/Time:  2020     Procedure Name: Flexible sigmoidoscopy with rectal endoscopic ultrasound     Indications: Rectal cancer    : Candice Henry MD    Referring Provider: Albert Atkinson MD -Isabella Youngblood MD    Anethesia/Sedation:  MAC anesthesia      Procedure Details   After infom consent was obtained for the procedure, with all risks and benefits of procedure explained the patient was taken to the endoscopy suite and placed in the left lateral decubitus position. Initially passed the rectal radial echoendoscope to 30 cm. The endoscope was withdrawn, and then the standard endoscope was passed. The patient tolerated the procedure well. Findings:   Endoscopic findings:   Small internal and external hemorrhoids were noted with skin tags. Patchy punctate areas 1-2 mm of congestion were noted in the rectum. Clips were noted in proximal rectum at 15 cm from anal verge where polyp resection was performed. It was hard to delineate whether the mucosal changes are from presence of clips or residual polyp. Mucosal congestion and granularity was noted at the area. EUS findings:   Mucosal and submucosal thickening was noted at the site pf polypectomy in proximal rectum. Artifacts were noted from the clips. Muscularis propria was normal and intact. No darin-lesional nodes were noted. It was hard to delineate whether the mucosal changes are from presence of clips or residual polyp. The iliac vessels were seen and were normal, and there was no adenopathy appreciated. No perirectal mass, lymph nodes were identified. Specimen Removed:  1. Biopsy from proximal rectal residual polyp; 2. Distal rectal biopsy    Complications: None.      EBL: None.    Recommendations:   -Await pathology  -CT scan abdomen and pelvis with and without contrast  -Continue current medications  -Follow up with Dr Sylvia Mcdermott MD  4/1/2020  10:03 AM

## 2020-04-01 NOTE — PERIOP NOTES

## 2020-04-01 NOTE — DISCHARGE INSTRUCTIONS
[address]  [address], [address] [facility]                                  Keya Cabrera  [id number]  1970    RECTAL EUS DISCHARGE INSTRUCTIONS    DISCOMFORT:  Redness at IV site- apply warm compress to area; if redness or soreness persist- contact your physician  There may be a slight amount of blood passed from the rectum  Gaseous discomfort- walking, belching will help relieve any discomfort  You may not operate a vehicle for 12 hours  You may not  engage in an occupation involving machinery or appliances for rest of today  You may not  drink alcoholic beverages for at least 12 hours  Avoid making any critical decisions for at least 24 hour    DIET:   {Gi dietary recommendations:69965}   - however -  remember your colon is empty and a heavy meal will produce gas. Avoid these foods:  vegetables, fried / greasy foods, carbonated drinks for today     ACTIVITY:  It is recommended that you spend the remainder of the day resting -  avoid any strenuous activity. CALL M.D. ANY SIGN OF:   Increasing pain, nausea, vomiting  Abdominal distension (swelling)  New increased bleeding (oral or rectal)  Fever (chills)  Pain in chest area  Bloody discharge from nose or mouth  Shortness of breath    You {Actions; may/not:56433}  take any Advil, Aspirin, Ibuprofen, Motrin, Aleve, or Goodys for 10 days, ONLY  Tylenol as needed for pain. Post procedure diagnosis: 1)proximal rectal polyp  2)abnormal rectal mucosa  3)internal hemorrhoids  4)thickened mucosa and submucosa proximal rectum      Follow-up Instructions:    Call Dr. Claudia Del Valle for any questions or problems. If we took a biopsy please call the office within 2 weeks to discuss your  pathology results.  Telephone # [phone]

## 2020-04-06 NOTE — ANESTHESIA POSTPROCEDURE EVALUATION
Post-Anesthesia Evaluation and Assessment    Patient: Gloria Lopez MRN: [id number]  SSN: [id number]    YOB: 1970  Age: 47 y.o. Sex: female      I have evaluated the patient and they are stable and ready for discharge from the PACU. Cardiovascular Function/Vital Signs  Visit Vitals  /84   Pulse 72   Temp 36.8 °C (98.2 °F)   Resp 16   Ht 5' 3\" (1.6 m)   Wt 90.7 kg (200 lb)   SpO2 100%   Breastfeeding No   BMI 35.43 kg/m²       Patient is status post MAC anesthesia for Procedure(s) with comments:  ENDOSCOPIC ULTRASOUND (EUS) - essential  cancer stagin  SIGMOIDOSCOPY FLEXIBLE  COLON BIOPSY. Nausea/Vomiting: None    Postoperative hydration reviewed and adequate. Pain:  Pain Scale 1: Numeric (0 - 10) (04/01/20 1012)  Pain Intensity 1: 0 (04/01/20 1012)   Managed    Neurological Status: At baseline    Mental Status, Level of Consciousness: Alert and  oriented to person, place, and time    Pulmonary Status:   O2 Device: Room air (04/01/20 1012)   Adequate oxygenation and airway patent    Complications related to anesthesia: None    Post-anesthesia assessment completed. No concerns    Signed By: Zabrina Sidhu MD     April 6, 2020              Procedure(s):  ENDOSCOPIC ULTRASOUND (EUS)  SIGMOIDOSCOPY FLEXIBLE  COLON BIOPSY.     MAC    <BSHSIANPOST>    Vitals Value Taken Time   /84 4/1/2020 10:12 AM   Temp 36.8 °C (98.2 °F) 4/1/2020 10:05 AM   Pulse 72 4/1/2020 10:12 AM   Resp 16 4/1/2020 10:12 AM   SpO2 100 % 4/1/2020 10:12 AM

## 2020-04-07 ENCOUNTER — HOSPITAL ENCOUNTER (OUTPATIENT)
Dept: CT IMAGING | Age: 50
Discharge: HOME | End: 2020-04-07
Attending: SPECIALIST
Payer: COMMERCIAL

## 2020-04-07 DIAGNOSIS — K92.1 HEMATOCHEZIA: ICD-10-CM

## 2020-04-07 DIAGNOSIS — I10 ESSENTIAL (PRIMARY) HYPERTENSION: ICD-10-CM

## 2020-04-07 DIAGNOSIS — C20 PRIMARY MALIGNANT NEOPLASM OF RECTUM (HCC): ICD-10-CM

## 2020-04-07 PROCEDURE — 74011636320 HC RX REV CODE- 636/320: Performed by: RADIOLOGY

## 2020-04-07 PROCEDURE — 74011000258 HC RX REV CODE- 258: Performed by: RADIOLOGY

## 2020-04-07 PROCEDURE — 74177 CT ABD & PELVIS W/CONTRAST: CPT

## 2020-04-07 RX ORDER — SODIUM CHLORIDE 0.9 % (FLUSH) 0.9 %
10 SYRINGE (ML) INJECTION
Status: COMPLETED | OUTPATIENT
Start: 2020-04-07 | End: 2020-04-07

## 2020-04-07 RX ADMIN — IOPAMIDOL 100 ML: 755 INJECTION, SOLUTION INTRAVENOUS at 11:55

## 2020-04-07 RX ADMIN — SODIUM CHLORIDE 100 ML: 900 INJECTION, SOLUTION INTRAVENOUS at 11:55

## 2020-04-07 RX ADMIN — Medication 10 ML: at 11:55

## 2020-04-07 RX ADMIN — IOHEXOL 50 ML: 240 INJECTION, SOLUTION INTRATHECAL; INTRAVASCULAR; INTRAVENOUS; ORAL at 11:55

## 2020-04-08 PROBLEM — C20 RECTAL CANCER (HCC): Chronic | Status: ACTIVE | Noted: 2020-04-08

## 2020-04-09 ENCOUNTER — HOSPITAL ENCOUNTER (OUTPATIENT)
Dept: PREADMISSION TESTING | Age: 50
Discharge: HOME | End: 2020-04-09
Payer: COMMERCIAL

## 2020-04-09 ENCOUNTER — HOSPITAL ENCOUNTER (OUTPATIENT)
Dept: GENERAL RADIOLOGY | Age: 50
Discharge: HOME | End: 2020-04-09
Payer: COMMERCIAL

## 2020-04-09 VITALS
BODY MASS INDEX: 34.73 KG/M2 | HEIGHT: 63 IN | WEIGHT: 196 LBS | HEART RATE: 91 BPM | TEMPERATURE: 98.8 F | SYSTOLIC BLOOD PRESSURE: 157 MMHG | DIASTOLIC BLOOD PRESSURE: 87 MMHG

## 2020-04-09 LAB
ABO + RH BLD: NORMAL
APTT PPP: 27.1 SEC (ref 22.1–32)
ATRIAL RATE: 85 BPM
BLOOD GROUP ANTIBODIES SERPL: NORMAL
CALCULATED P AXIS, ECG09: 33 DEGREES
CALCULATED R AXIS, ECG10: -12 DEGREES
CALCULATED T AXIS, ECG11: 20 DEGREES
DIAGNOSIS, 93000: NORMAL
INR PPP: 1 (ref 0.9–1.1)
P-R INTERVAL, ECG05: 178 MS
PROTHROMBIN TIME: 10.3 SEC (ref 9–11.1)
Q-T INTERVAL, ECG07: 378 MS
QRS DURATION, ECG06: 84 MS
QTC CALCULATION (BEZET), ECG08: 449 MS
SPECIMEN EXP DATE BLD: NORMAL
THERAPEUTIC RANGE,PTTT: NORMAL SECS (ref 58–77)
VENTRICULAR RATE, ECG03: 85 BPM

## 2020-04-09 PROCEDURE — 36415 COLL VENOUS BLD VENIPUNCTURE: CPT

## 2020-04-09 PROCEDURE — 86900 BLOOD TYPING SEROLOGIC ABO: CPT

## 2020-04-09 PROCEDURE — 93005 ELECTROCARDIOGRAM TRACING: CPT

## 2020-04-09 PROCEDURE — 71046 X-RAY EXAM CHEST 2 VIEWS: CPT

## 2020-04-09 PROCEDURE — 85730 THROMBOPLASTIN TIME PARTIAL: CPT

## 2020-04-09 PROCEDURE — 85610 PROTHROMBIN TIME: CPT

## 2020-04-09 NOTE — PERIOP NOTES
PATIENT GIVEN 2 BOTTLES OF CHG SOLUTION  WITH INSTRUCTIONS BOTH WRITTEN AND VERBAL FOR THEIR USE. PATIENT VOICED UNDERSTANDING OF SAME AND HAD ALL QUESTIONS ADDRESSED AND ANSWERED TO THEIR SATISFACTION. PATIENT ALSO GIVEN PRE-OP INSTRUCTIONS AND VOICED UNDERSTANDING OF SAME. PATIENT GIVEN INCENTIVE SPIROMETER. INSTRUCTIONS FOR ITS USE GIVEN VERBALLY AND IN WRITING. PATIENT DEMONSTRATED UNDERSTANDING OF SAME. ERAS BOOKLET REVIEWED AND PATIENT APPEARED TO HAVE A GOOD UNDERSTANDING OF INSTRUCTIONS. DENIED ANY QUESTIONS AT THIS TIME. PATIENT GIVEN INSTRUCTIONS/DIRECTIONS FOR CXR TO BE DONE AT . [facility] , [address] [place] [address]; ORDER PLACED IN [facility].

## 2020-04-14 NOTE — PROGRESS NOTES
Spoke to Dr. Mykel Guerrero regarding pts colonscopy and findings, advised to follow up with Dr. Mykel Guerrero as advised

## 2020-05-04 ENCOUNTER — HOSPITAL ENCOUNTER (OUTPATIENT)
Dept: LAB | Age: 50
Discharge: HOME | End: 2020-05-04
Payer: COMMERCIAL

## 2020-05-04 PROCEDURE — 87635 SARS-COV-2 COVID-19 AMP PRB: CPT

## 2020-05-04 NOTE — PERIOP NOTES
PATIENT CALLED AND MADE AWARE OF COVID-19 TESTING NEEDED TO BE DONE WITHIN 72 HOURS OF SURGERY. COVID-19 TESTING APPOINTMENT MADE FOR PATIENT. PATIENT INSTRUCTED ON SELF QUARANTINE BETWEEN TESTING AND ARRIVAL TIME DAY OF SURGERY.

## 2020-05-06 LAB — SARS-COV-2, COV2NT: NOT DETECTED

## 2020-05-07 ENCOUNTER — ANESTHESIA EVENT (OUTPATIENT)
Dept: SURGERY | Age: 50
DRG: 330 | End: 2020-05-07
Payer: COMMERCIAL

## 2020-05-07 ENCOUNTER — ANESTHESIA (OUTPATIENT)
Dept: SURGERY | Age: 50
DRG: 330 | End: 2020-05-07
Payer: COMMERCIAL

## 2020-05-07 ENCOUNTER — HOSPITAL ENCOUNTER (INPATIENT)
Age: 50
LOS: 3 days | Discharge: HOME | DRG: 330 | End: 2020-05-10
Attending: COLON & RECTAL SURGERY | Admitting: COLON & RECTAL SURGERY
Payer: COMMERCIAL

## 2020-05-07 DIAGNOSIS — G89.18 ACUTE POST-OPERATIVE PAIN: Primary | ICD-10-CM

## 2020-05-07 LAB
ANION GAP SERPL CALC-SCNC: 6 MMOL/L (ref 5–15)
BASOPHILS # BLD: 0 K/UL (ref 0–0.1)
BASOPHILS NFR BLD: 0 % (ref 0–1)
BUN SERPL-MCNC: 12 MG/DL (ref 6–20)
BUN/CREAT SERPL: 8 (ref 12–20)
CALCIUM SERPL-MCNC: 8.1 MG/DL (ref 8.5–10.1)
CHLORIDE SERPL-SCNC: 106 MMOL/L (ref 97–108)
CO2 SERPL-SCNC: 26 MMOL/L (ref 21–32)
CREAT SERPL-MCNC: 1.56 MG/DL (ref 0.55–1.02)
DIFFERENTIAL METHOD BLD: ABNORMAL
EOSINOPHIL # BLD: 0 K/UL (ref 0–0.4)
EOSINOPHIL NFR BLD: 0 % (ref 0–7)
ERYTHROCYTE [DISTWIDTH] IN BLOOD BY AUTOMATED COUNT: 11.9 % (ref 11.5–14.5)
GLUCOSE SERPL-MCNC: 145 MG/DL (ref 65–100)
HCG UR QL: NEGATIVE
HCT VFR BLD AUTO: 38.4 % (ref 35–47)
HGB BLD-MCNC: 12.3 G/DL (ref 11.5–16)
HGB BLD-MCNC: 14.1 G/DL (ref 11.5–16)
IMM GRANULOCYTES # BLD AUTO: 0.1 K/UL (ref 0–0.04)
IMM GRANULOCYTES NFR BLD AUTO: 1 % (ref 0–0.5)
LYMPHOCYTES # BLD: 1.3 K/UL (ref 0.8–3.5)
LYMPHOCYTES NFR BLD: 10 % (ref 12–49)
MAGNESIUM SERPL-MCNC: 2.8 MG/DL (ref 1.6–2.4)
MCH RBC QN AUTO: 30.1 PG (ref 26–34)
MCHC RBC AUTO-ENTMCNC: 32 G/DL (ref 30–36.5)
MCV RBC AUTO: 93.9 FL (ref 80–99)
MONOCYTES # BLD: 0.5 K/UL (ref 0–1)
MONOCYTES NFR BLD: 4 % (ref 5–13)
NEUTS SEG # BLD: 10.4 K/UL (ref 1.8–8)
NEUTS SEG NFR BLD: 85 % (ref 32–75)
NRBC # BLD: 0 K/UL (ref 0–0.01)
NRBC BLD-RTO: 0 PER 100 WBC
PHOSPHATE SERPL-MCNC: 3.2 MG/DL (ref 2.6–4.7)
PLATELET # BLD AUTO: 295 K/UL (ref 150–400)
PMV BLD AUTO: 9.5 FL (ref 8.9–12.9)
POTASSIUM SERPL-SCNC: 4.5 MMOL/L (ref 3.5–5.1)
RBC # BLD AUTO: 4.09 M/UL (ref 3.8–5.2)
SODIUM SERPL-SCNC: 138 MMOL/L (ref 136–145)
WBC # BLD AUTO: 12.3 K/UL (ref 3.6–11)

## 2020-05-07 PROCEDURE — 77030018846 HC SOL IRR STRL H20 ICUM -A: Performed by: COLON & RECTAL SURGERY

## 2020-05-07 PROCEDURE — 76210000017 HC OR PH I REC 1.5 TO 2 HR: Performed by: COLON & RECTAL SURGERY

## 2020-05-07 PROCEDURE — 77030018832 HC SOL IRR H20 ICUM -A: Performed by: COLON & RECTAL SURGERY

## 2020-05-07 PROCEDURE — P9045 ALBUMIN (HUMAN), 5%, 250 ML: HCPCS | Performed by: NURSE ANESTHETIST, CERTIFIED REGISTERED

## 2020-05-07 PROCEDURE — 77030019702 HC WRP THER MENM -C: Performed by: COLON & RECTAL SURGERY

## 2020-05-07 PROCEDURE — 77030010507 HC ADH SKN DERMBND J&J -B: Performed by: COLON & RECTAL SURGERY

## 2020-05-07 PROCEDURE — 77030008608 HC TRCR ENDOSC SMTH AMR -B: Performed by: COLON & RECTAL SURGERY

## 2020-05-07 PROCEDURE — C1765 ADHESION BARRIER: HCPCS | Performed by: COLON & RECTAL SURGERY

## 2020-05-07 PROCEDURE — 77030002916 HC SUT ETHLN J&J -A: Performed by: COLON & RECTAL SURGERY

## 2020-05-07 PROCEDURE — 80048 BASIC METABOLIC PNL TOTAL CA: CPT

## 2020-05-07 PROCEDURE — 77030031139 HC SUT VCRL2 J&J -A: Performed by: COLON & RECTAL SURGERY

## 2020-05-07 PROCEDURE — 74011000258 HC RX REV CODE- 258: Performed by: COLON & RECTAL SURGERY

## 2020-05-07 PROCEDURE — 76010000178 HC OR TIME 5.5 TO 6 HR INTENSV-TIER 1: Performed by: COLON & RECTAL SURGERY

## 2020-05-07 PROCEDURE — 77030041238 HC TBNG INSUF MDII -A: Performed by: COLON & RECTAL SURGERY

## 2020-05-07 PROCEDURE — 74011000250 HC RX REV CODE- 250: Performed by: NURSE ANESTHETIST, CERTIFIED REGISTERED

## 2020-05-07 PROCEDURE — 85025 COMPLETE CBC W/AUTO DIFF WBC: CPT

## 2020-05-07 PROCEDURE — 74011250636 HC RX REV CODE- 250/636: Performed by: NURSE ANESTHETIST, CERTIFIED REGISTERED

## 2020-05-07 PROCEDURE — 77030040922 HC BLNKT HYPOTHRM STRY -A

## 2020-05-07 PROCEDURE — 77030026438 HC STYL ET INTUB CARD -A: Performed by: ANESTHESIOLOGY

## 2020-05-07 PROCEDURE — 74011000250 HC RX REV CODE- 250: Performed by: COLON & RECTAL SURGERY

## 2020-05-07 PROCEDURE — C1758 CATHETER, URETERAL: HCPCS | Performed by: COLON & RECTAL SURGERY

## 2020-05-07 PROCEDURE — 77030037032 HC INSRT SCIS CLICKLLINE DISP STOR -B: Performed by: COLON & RECTAL SURGERY

## 2020-05-07 PROCEDURE — 77030002933 HC SUT MCRYL J&J -A: Performed by: COLON & RECTAL SURGERY

## 2020-05-07 PROCEDURE — 77030012770 HC TRCR OPT FX AMR -B: Performed by: COLON & RECTAL SURGERY

## 2020-05-07 PROCEDURE — 36415 COLL VENOUS BLD VENIPUNCTURE: CPT

## 2020-05-07 PROCEDURE — 77030027876 HC STPLR ENDOSC FLX PWR J&J -G1: Performed by: COLON & RECTAL SURGERY

## 2020-05-07 PROCEDURE — 77030040831 HC BAG URINE DRNG MDII -A: Performed by: COLON & RECTAL SURGERY

## 2020-05-07 PROCEDURE — 77030016151 HC PROTCTR LNS DFOG COVD -B: Performed by: COLON & RECTAL SURGERY

## 2020-05-07 PROCEDURE — 74011250636 HC RX REV CODE- 250/636

## 2020-05-07 PROCEDURE — 77030008684 HC TU ET CUF COVD -B: Performed by: ANESTHESIOLOGY

## 2020-05-07 PROCEDURE — 77030002966 HC SUT PDS J&J -A: Performed by: COLON & RECTAL SURGERY

## 2020-05-07 PROCEDURE — 77030008756 HC TU IRR SUC STRY -B: Performed by: COLON & RECTAL SURGERY

## 2020-05-07 PROCEDURE — 77030040506 HC DRN WND MDII -A: Performed by: COLON & RECTAL SURGERY

## 2020-05-07 PROCEDURE — 77030009968 HC RELD STPLR ENDOSC J&J -D: Performed by: COLON & RECTAL SURGERY

## 2020-05-07 PROCEDURE — 77030040923 HC STPLR ENDOSC ECHELON J&J -E: Performed by: COLON & RECTAL SURGERY

## 2020-05-07 PROCEDURE — 77030019927 HC TBNG IRR CYSTO BAXT -A: Performed by: COLON & RECTAL SURGERY

## 2020-05-07 PROCEDURE — 81025 URINE PREGNANCY TEST: CPT

## 2020-05-07 PROCEDURE — 0T788DZ DILATION OF BILATERAL URETERS WITH INTRALUMINAL DEVICE, VIA NATURAL OR ARTIFICIAL OPENING ENDOSCOPIC: ICD-10-PCS | Performed by: UROLOGY

## 2020-05-07 PROCEDURE — 77030009527 HC GEL PRT SYS AMR -E: Performed by: COLON & RECTAL SURGERY

## 2020-05-07 PROCEDURE — 77030005546 HC CATH URETH FOL 3W BARD -A: Performed by: COLON & RECTAL SURGERY

## 2020-05-07 PROCEDURE — 74011250636 HC RX REV CODE- 250/636: Performed by: ANESTHESIOLOGY

## 2020-05-07 PROCEDURE — 84100 ASSAY OF PHOSPHORUS: CPT

## 2020-05-07 PROCEDURE — 0D1E0ZP BYPASS LARGE INTESTINE TO RECTUM, OPEN APPROACH: ICD-10-PCS | Performed by: COLON & RECTAL SURGERY

## 2020-05-07 PROCEDURE — 77030002986 HC SUT PROL J&J -A: Performed by: COLON & RECTAL SURGERY

## 2020-05-07 PROCEDURE — 83735 ASSAY OF MAGNESIUM: CPT

## 2020-05-07 PROCEDURE — 85018 HEMOGLOBIN: CPT

## 2020-05-07 PROCEDURE — 77030018836 HC SOL IRR NACL ICUM -A: Performed by: COLON & RECTAL SURGERY

## 2020-05-07 PROCEDURE — 77030021707 HC SET IRR FLD WRMR 3M -B: Performed by: COLON & RECTAL SURGERY

## 2020-05-07 PROCEDURE — 77030036731 HC STPLR ENDOSC J&J -F: Performed by: COLON & RECTAL SURGERY

## 2020-05-07 PROCEDURE — 0DTP0ZZ RESECTION OF RECTUM, OPEN APPROACH: ICD-10-PCS | Performed by: COLON & RECTAL SURGERY

## 2020-05-07 PROCEDURE — 74011250637 HC RX REV CODE- 250/637: Performed by: COLON & RECTAL SURGERY

## 2020-05-07 PROCEDURE — 77030011640 HC PAD GRND REM COVD -A: Performed by: COLON & RECTAL SURGERY

## 2020-05-07 PROCEDURE — 77030040361 HC SLV COMPR DVT MDII -B: Performed by: COLON & RECTAL SURGERY

## 2020-05-07 PROCEDURE — 77030036732 HC RELD STPLR VASC J&J -F: Performed by: COLON & RECTAL SURGERY

## 2020-05-07 PROCEDURE — 76060000042 HC ANESTHESIA 5.5 TO 6 HR: Performed by: COLON & RECTAL SURGERY

## 2020-05-07 PROCEDURE — 77030002996 HC SUT SLK J&J -A: Performed by: COLON & RECTAL SURGERY

## 2020-05-07 PROCEDURE — 65270000029 HC RM PRIVATE

## 2020-05-07 PROCEDURE — 74011250636 HC RX REV CODE- 250/636: Performed by: COLON & RECTAL SURGERY

## 2020-05-07 PROCEDURE — 77030005513 HC CATH URETH FOL11 MDII -B: Performed by: COLON & RECTAL SURGERY

## 2020-05-07 RX ORDER — LIDOCAINE HYDROCHLORIDE ANHYDROUS AND DEXTROSE MONOHYDRATE .8; 5 G/100ML; G/100ML
2 INJECTION, SOLUTION INTRAVENOUS CONTINUOUS
Status: DISCONTINUED | OUTPATIENT
Start: 2020-05-07 | End: 2020-05-07

## 2020-05-07 RX ORDER — CELECOXIB 200 MG/1
200 CAPSULE ORAL ONCE
Status: COMPLETED | OUTPATIENT
Start: 2020-05-07 | End: 2020-05-07

## 2020-05-07 RX ORDER — ALBUMIN HUMAN 50 G/1000ML
SOLUTION INTRAVENOUS
Status: DISCONTINUED | OUTPATIENT
Start: 2020-05-07 | End: 2020-05-07 | Stop reason: HOSPADM

## 2020-05-07 RX ORDER — CELECOXIB 100 MG/1
100 CAPSULE ORAL 2 TIMES DAILY
Status: DISCONTINUED | OUTPATIENT
Start: 2020-05-08 | End: 2020-05-09

## 2020-05-07 RX ORDER — PROPOFOL 10 MG/ML
INJECTION, EMULSION INTRAVENOUS AS NEEDED
Status: DISCONTINUED | OUTPATIENT
Start: 2020-05-07 | End: 2020-05-07 | Stop reason: HOSPADM

## 2020-05-07 RX ORDER — DEXMEDETOMIDINE HYDROCHLORIDE 100 UG/ML
INJECTION, SOLUTION INTRAVENOUS AS NEEDED
Status: DISCONTINUED | OUTPATIENT
Start: 2020-05-07 | End: 2020-05-07 | Stop reason: HOSPADM

## 2020-05-07 RX ORDER — ACETAMINOPHEN 10 MG/ML
INJECTION, SOLUTION INTRAVENOUS
Status: COMPLETED
Start: 2020-05-07 | End: 2020-05-07

## 2020-05-07 RX ORDER — SODIUM CHLORIDE 0.9 % (FLUSH) 0.9 %
5-40 SYRINGE (ML) INJECTION EVERY 8 HOURS
Status: DISCONTINUED | OUTPATIENT
Start: 2020-05-07 | End: 2020-05-07 | Stop reason: HOSPADM

## 2020-05-07 RX ORDER — SODIUM CHLORIDE 0.9 % (FLUSH) 0.9 %
5-40 SYRINGE (ML) INJECTION AS NEEDED
Status: DISCONTINUED | OUTPATIENT
Start: 2020-05-07 | End: 2020-05-07 | Stop reason: HOSPADM

## 2020-05-07 RX ORDER — SODIUM CHLORIDE, SODIUM LACTATE, POTASSIUM CHLORIDE, CALCIUM CHLORIDE 600; 310; 30; 20 MG/100ML; MG/100ML; MG/100ML; MG/100ML
75 INJECTION, SOLUTION INTRAVENOUS CONTINUOUS
Status: DISCONTINUED | OUTPATIENT
Start: 2020-05-07 | End: 2020-05-08

## 2020-05-07 RX ORDER — SODIUM CHLORIDE, SODIUM LACTATE, POTASSIUM CHLORIDE, CALCIUM CHLORIDE 600; 310; 30; 20 MG/100ML; MG/100ML; MG/100ML; MG/100ML
INJECTION, SOLUTION INTRAVENOUS
Status: DISCONTINUED | OUTPATIENT
Start: 2020-05-07 | End: 2020-05-07 | Stop reason: HOSPADM

## 2020-05-07 RX ORDER — SODIUM CHLORIDE, SODIUM LACTATE, POTASSIUM CHLORIDE, CALCIUM CHLORIDE 600; 310; 30; 20 MG/100ML; MG/100ML; MG/100ML; MG/100ML
75 INJECTION, SOLUTION INTRAVENOUS CONTINUOUS
Status: DISCONTINUED | OUTPATIENT
Start: 2020-05-07 | End: 2020-05-07 | Stop reason: HOSPADM

## 2020-05-07 RX ORDER — NALOXONE HYDROCHLORIDE 0.4 MG/ML
0.4 INJECTION, SOLUTION INTRAMUSCULAR; INTRAVENOUS; SUBCUTANEOUS AS NEEDED
Status: DISCONTINUED | OUTPATIENT
Start: 2020-05-07 | End: 2020-05-10 | Stop reason: HOSPADM

## 2020-05-07 RX ORDER — DEXAMETHASONE SODIUM PHOSPHATE 4 MG/ML
INJECTION, SOLUTION INTRA-ARTICULAR; INTRALESIONAL; INTRAMUSCULAR; INTRAVENOUS; SOFT TISSUE AS NEEDED
Status: DISCONTINUED | OUTPATIENT
Start: 2020-05-07 | End: 2020-05-07 | Stop reason: HOSPADM

## 2020-05-07 RX ORDER — PHENYLEPHRINE HCL IN 0.9% NACL 0.4MG/10ML
SYRINGE (ML) INTRAVENOUS AS NEEDED
Status: DISCONTINUED | OUTPATIENT
Start: 2020-05-07 | End: 2020-05-07 | Stop reason: HOSPADM

## 2020-05-07 RX ORDER — FENTANYL CITRATE 50 UG/ML
INJECTION, SOLUTION INTRAMUSCULAR; INTRAVENOUS AS NEEDED
Status: DISCONTINUED | OUTPATIENT
Start: 2020-05-07 | End: 2020-05-07 | Stop reason: HOSPADM

## 2020-05-07 RX ORDER — SODIUM CHLORIDE 9 MG/ML
1000 INJECTION, SOLUTION INTRAVENOUS CONTINUOUS
Status: DISCONTINUED | OUTPATIENT
Start: 2020-05-07 | End: 2020-05-07 | Stop reason: HOSPADM

## 2020-05-07 RX ORDER — KETOROLAC TROMETHAMINE 30 MG/ML
15 INJECTION, SOLUTION INTRAMUSCULAR; INTRAVENOUS EVERY 6 HOURS
Status: COMPLETED | OUTPATIENT
Start: 2020-05-07 | End: 2020-05-08

## 2020-05-07 RX ORDER — ALVIMOPAN 12 MG/1
12 CAPSULE ORAL ONCE
Status: COMPLETED | OUTPATIENT
Start: 2020-05-07 | End: 2020-05-07

## 2020-05-07 RX ORDER — ALVIMOPAN 12 MG/1
12 CAPSULE ORAL 2 TIMES DAILY
Status: DISCONTINUED | OUTPATIENT
Start: 2020-05-08 | End: 2020-05-10 | Stop reason: HOSPADM

## 2020-05-07 RX ORDER — ACETAMINOPHEN 10 MG/ML
1000 INJECTION, SOLUTION INTRAVENOUS ONCE
Status: COMPLETED | OUTPATIENT
Start: 2020-05-07 | End: 2020-05-07

## 2020-05-07 RX ORDER — ONDANSETRON 2 MG/ML
INJECTION INTRAMUSCULAR; INTRAVENOUS AS NEEDED
Status: DISCONTINUED | OUTPATIENT
Start: 2020-05-07 | End: 2020-05-07 | Stop reason: HOSPADM

## 2020-05-07 RX ORDER — FENTANYL CITRATE 50 UG/ML
25 INJECTION, SOLUTION INTRAMUSCULAR; INTRAVENOUS
Status: COMPLETED | OUTPATIENT
Start: 2020-05-07 | End: 2020-05-07

## 2020-05-07 RX ORDER — LIDOCAINE HYDROCHLORIDE 20 MG/ML
INJECTION, SOLUTION EPIDURAL; INFILTRATION; INTRACAUDAL; PERINEURAL
Status: DISCONTINUED | OUTPATIENT
Start: 2020-05-07 | End: 2020-05-07 | Stop reason: HOSPADM

## 2020-05-07 RX ORDER — ONDANSETRON 4 MG/1
4 TABLET, ORALLY DISINTEGRATING ORAL
Status: DISCONTINUED | OUTPATIENT
Start: 2020-05-07 | End: 2020-05-10 | Stop reason: HOSPADM

## 2020-05-07 RX ORDER — BUPIVACAINE HYDROCHLORIDE AND EPINEPHRINE 5; 5 MG/ML; UG/ML
30 INJECTION, SOLUTION EPIDURAL; INTRACAUDAL; PERINEURAL ONCE
Status: CANCELLED | OUTPATIENT
Start: 2020-05-07 | End: 2020-05-07

## 2020-05-07 RX ORDER — MAGNESIUM SULFATE HEPTAHYDRATE 40 MG/ML
INJECTION, SOLUTION INTRAVENOUS AS NEEDED
Status: DISCONTINUED | OUTPATIENT
Start: 2020-05-07 | End: 2020-05-07 | Stop reason: HOSPADM

## 2020-05-07 RX ORDER — GABAPENTIN 300 MG/1
600 CAPSULE ORAL ONCE
Status: COMPLETED | OUTPATIENT
Start: 2020-05-07 | End: 2020-05-07

## 2020-05-07 RX ORDER — SUCCINYLCHOLINE CHLORIDE 20 MG/ML
INJECTION INTRAMUSCULAR; INTRAVENOUS AS NEEDED
Status: DISCONTINUED | OUTPATIENT
Start: 2020-05-07 | End: 2020-05-07 | Stop reason: HOSPADM

## 2020-05-07 RX ORDER — HYDROMORPHONE HYDROCHLORIDE 2 MG/1
2 TABLET ORAL
Status: DISCONTINUED | OUTPATIENT
Start: 2020-05-07 | End: 2020-05-08

## 2020-05-07 RX ORDER — ACETAMINOPHEN 500 MG
1000 TABLET ORAL EVERY 6 HOURS
Status: DISCONTINUED | OUTPATIENT
Start: 2020-05-08 | End: 2020-05-10 | Stop reason: HOSPADM

## 2020-05-07 RX ORDER — LIDOCAINE HYDROCHLORIDE 20 MG/ML
INJECTION, SOLUTION EPIDURAL; INFILTRATION; INTRACAUDAL; PERINEURAL AS NEEDED
Status: DISCONTINUED | OUTPATIENT
Start: 2020-05-07 | End: 2020-05-07 | Stop reason: HOSPADM

## 2020-05-07 RX ORDER — HYDROCHLOROTHIAZIDE 25 MG/1
12.5 TABLET ORAL DAILY
Status: DISCONTINUED | OUTPATIENT
Start: 2020-05-08 | End: 2020-05-10 | Stop reason: HOSPADM

## 2020-05-07 RX ORDER — ONDANSETRON 2 MG/ML
4 INJECTION INTRAMUSCULAR; INTRAVENOUS AS NEEDED
Status: DISCONTINUED | OUTPATIENT
Start: 2020-05-07 | End: 2020-05-07 | Stop reason: HOSPADM

## 2020-05-07 RX ORDER — LIDOCAINE HYDROCHLORIDE ANHYDROUS AND DEXTROSE MONOHYDRATE .8; 5 G/100ML; G/100ML
1 INJECTION, SOLUTION INTRAVENOUS CONTINUOUS
Status: DISPENSED | OUTPATIENT
Start: 2020-05-07 | End: 2020-05-09

## 2020-05-07 RX ORDER — KETAMINE HYDROCHLORIDE 100 MG/ML
INJECTION, SOLUTION INTRAMUSCULAR; INTRAVENOUS AS NEEDED
Status: DISCONTINUED | OUTPATIENT
Start: 2020-05-07 | End: 2020-05-07 | Stop reason: HOSPADM

## 2020-05-07 RX ORDER — NORETHINDRONE ACETATE AND ETHINYL ESTRADIOL, AND FERROUS FUMARATE 1MG-20(24)
1 KIT ORAL DAILY
Status: DISCONTINUED | OUTPATIENT
Start: 2020-05-07 | End: 2020-05-10 | Stop reason: HOSPADM

## 2020-05-07 RX ORDER — ENOXAPARIN SODIUM 100 MG/ML
40 INJECTION SUBCUTANEOUS EVERY 24 HOURS
Status: DISCONTINUED | OUTPATIENT
Start: 2020-05-08 | End: 2020-05-09

## 2020-05-07 RX ORDER — ROCURONIUM BROMIDE 10 MG/ML
INJECTION, SOLUTION INTRAVENOUS AS NEEDED
Status: DISCONTINUED | OUTPATIENT
Start: 2020-05-07 | End: 2020-05-07 | Stop reason: HOSPADM

## 2020-05-07 RX ORDER — ACETAMINOPHEN 500 MG
1000 TABLET ORAL ONCE
Status: COMPLETED | OUTPATIENT
Start: 2020-05-07 | End: 2020-05-07

## 2020-05-07 RX ORDER — MIDAZOLAM HYDROCHLORIDE 1 MG/ML
INJECTION, SOLUTION INTRAMUSCULAR; INTRAVENOUS AS NEEDED
Status: DISCONTINUED | OUTPATIENT
Start: 2020-05-07 | End: 2020-05-07 | Stop reason: HOSPADM

## 2020-05-07 RX ORDER — BUPIVACAINE HYDROCHLORIDE AND EPINEPHRINE 5; 5 MG/ML; UG/ML
30 INJECTION, SOLUTION EPIDURAL; INTRACAUDAL; PERINEURAL ONCE
Status: COMPLETED | OUTPATIENT
Start: 2020-05-07 | End: 2020-05-07

## 2020-05-07 RX ADMIN — FENTANYL CITRATE 25 MCG: 50 INJECTION INTRAMUSCULAR; INTRAVENOUS at 18:15

## 2020-05-07 RX ADMIN — MAGNESIUM SULFATE 2 G: 2 INJECTION INTRAVENOUS at 12:00

## 2020-05-07 RX ADMIN — ALVIMOPAN 12 MG: 12 CAPSULE ORAL at 11:14

## 2020-05-07 RX ADMIN — SODIUM CHLORIDE, POTASSIUM CHLORIDE, SODIUM LACTATE AND CALCIUM CHLORIDE: 600; 310; 30; 20 INJECTION, SOLUTION INTRAVENOUS at 11:41

## 2020-05-07 RX ADMIN — ACETAMINOPHEN 1000 MG: 10 INJECTION, SOLUTION INTRAVENOUS at 18:31

## 2020-05-07 RX ADMIN — DEXMEDETOMIDINE HYDROCHLORIDE 2 MCG: 100 INJECTION, SOLUTION, CONCENTRATE INTRAVENOUS at 16:37

## 2020-05-07 RX ADMIN — DEXMEDETOMIDINE HYDROCHLORIDE 6 MCG: 100 INJECTION, SOLUTION, CONCENTRATE INTRAVENOUS at 15:30

## 2020-05-07 RX ADMIN — DEXAMETHASONE SODIUM PHOSPHATE 8 MG: 4 INJECTION, SOLUTION INTRAMUSCULAR; INTRAVENOUS at 12:00

## 2020-05-07 RX ADMIN — FENTANYL CITRATE 25 MCG: 50 INJECTION INTRAMUSCULAR; INTRAVENOUS at 17:50

## 2020-05-07 RX ADMIN — ROCURONIUM BROMIDE 20 MG: 10 SOLUTION INTRAVENOUS at 15:07

## 2020-05-07 RX ADMIN — SODIUM CHLORIDE, SODIUM LACTATE, POTASSIUM CHLORIDE, AND CALCIUM CHLORIDE 75 ML/HR: 600; 310; 30; 20 INJECTION, SOLUTION INTRAVENOUS at 11:10

## 2020-05-07 RX ADMIN — SODIUM CHLORIDE, POTASSIUM CHLORIDE, SODIUM LACTATE AND CALCIUM CHLORIDE: 600; 310; 30; 20 INJECTION, SOLUTION INTRAVENOUS at 15:24

## 2020-05-07 RX ADMIN — MIDAZOLAM 2 MG: 1 INJECTION INTRAMUSCULAR; INTRAVENOUS at 11:41

## 2020-05-07 RX ADMIN — ACETAMINOPHEN 1000 MG: 500 TABLET, FILM COATED ORAL at 23:22

## 2020-05-07 RX ADMIN — CELECOXIB 200 MG: 200 CAPSULE ORAL at 11:13

## 2020-05-07 RX ADMIN — ROCURONIUM BROMIDE 20 MG: 10 SOLUTION INTRAVENOUS at 14:18

## 2020-05-07 RX ADMIN — Medication 80 MCG: at 13:09

## 2020-05-07 RX ADMIN — LIDOCAINE HYDROCHLORIDE 1 MG/KG/HR: 8 INJECTION, SOLUTION INTRAVENOUS at 17:41

## 2020-05-07 RX ADMIN — SODIUM CHLORIDE, SODIUM LACTATE, POTASSIUM CHLORIDE, AND CALCIUM CHLORIDE 75 ML/HR: 600; 310; 30; 20 INJECTION, SOLUTION INTRAVENOUS at 17:41

## 2020-05-07 RX ADMIN — FENTANYL CITRATE 100 MCG: 50 INJECTION, SOLUTION INTRAMUSCULAR; INTRAVENOUS at 11:48

## 2020-05-07 RX ADMIN — PHENYLEPHRINE HYDROCHLORIDE 20 MCG/MIN: 10 INJECTION INTRAVENOUS at 13:06

## 2020-05-07 RX ADMIN — DEXMEDETOMIDINE HYDROCHLORIDE 6 MCG: 100 INJECTION, SOLUTION, CONCENTRATE INTRAVENOUS at 17:06

## 2020-05-07 RX ADMIN — LIDOCAINE HYDROCHLORIDE 100 MG: 20 INJECTION, SOLUTION EPIDURAL; INFILTRATION; INTRACAUDAL; PERINEURAL at 11:48

## 2020-05-07 RX ADMIN — SUCCINYLCHOLINE CHLORIDE 180 MG: 20 INJECTION, SOLUTION INTRAMUSCULAR; INTRAVENOUS at 11:48

## 2020-05-07 RX ADMIN — Medication 80 MCG: at 12:58

## 2020-05-07 RX ADMIN — ACETAMINOPHEN 1000 MG: 500 TABLET ORAL at 11:14

## 2020-05-07 RX ADMIN — ALBUMIN (HUMAN) 250 ML/HR: 12.5 INJECTION, SOLUTION INTRAVENOUS at 12:18

## 2020-05-07 RX ADMIN — SUGAMMADEX 200 MG: 100 INJECTION, SOLUTION INTRAVENOUS at 17:18

## 2020-05-07 RX ADMIN — Medication 80 MCG: at 12:16

## 2020-05-07 RX ADMIN — KETAMINE HYDROCHLORIDE 50 MG: 100 INJECTION INTRAMUSCULAR; INTRAVENOUS at 11:58

## 2020-05-07 RX ADMIN — FENTANYL CITRATE 25 MCG: 50 INJECTION INTRAMUSCULAR; INTRAVENOUS at 17:40

## 2020-05-07 RX ADMIN — GABAPENTIN 600 MG: 300 CAPSULE ORAL at 11:14

## 2020-05-07 RX ADMIN — DEXMEDETOMIDINE HYDROCHLORIDE 6 MCG: 100 INJECTION, SOLUTION, CONCENTRATE INTRAVENOUS at 17:17

## 2020-05-07 RX ADMIN — ROCURONIUM BROMIDE 20 MG: 10 SOLUTION INTRAVENOUS at 16:22

## 2020-05-07 RX ADMIN — ROCURONIUM BROMIDE 35 MG: 10 SOLUTION INTRAVENOUS at 12:02

## 2020-05-07 RX ADMIN — CEFOTETAN DISODIUM 2 G: 2 INJECTION, POWDER, FOR SOLUTION INTRAMUSCULAR; INTRAVENOUS at 12:19

## 2020-05-07 RX ADMIN — LIDOCAINE HYDROCHLORIDE 13 ML/HR: 20 INJECTION, SOLUTION EPIDURAL; INFILTRATION; INTRACAUDAL; PERINEURAL at 12:00

## 2020-05-07 RX ADMIN — ONDANSETRON HYDROCHLORIDE 4 MG: 2 INJECTION, SOLUTION INTRAMUSCULAR; INTRAVENOUS at 16:50

## 2020-05-07 RX ADMIN — Medication 80 MCG: at 13:06

## 2020-05-07 RX ADMIN — FENTANYL CITRATE 25 MCG: 50 INJECTION INTRAMUSCULAR; INTRAVENOUS at 18:05

## 2020-05-07 RX ADMIN — KETOROLAC TROMETHAMINE 15 MG: 30 INJECTION, SOLUTION INTRAMUSCULAR at 21:35

## 2020-05-07 RX ADMIN — ROCURONIUM BROMIDE 30 MG: 10 SOLUTION INTRAVENOUS at 13:00

## 2020-05-07 RX ADMIN — ROCURONIUM BROMIDE 5 MG: 10 SOLUTION INTRAVENOUS at 11:48

## 2020-05-07 RX ADMIN — PROPOFOL 200 MG: 10 INJECTION, EMULSION INTRAVENOUS at 11:48

## 2020-05-07 NOTE — BRIEF OP NOTE
Brief Postoperative Note    Patient: Brianna Franks  YOB: 1970  MRN: [id number]    Date of Procedure: 5/7/2020     Pre-Op Diagnosis: Rectal cancer    Post-Op Diagnosis:   Rectal cancer    Procedure:  Hand-assisted laparoscopic low anterior resection, mobilization of the splenic flexure, and coloproctostomy. Surgeon:  Bhavik Diaz MD    Surgical Assistant:  Ezra Reynoso    Anesthesia: General endotracheal    Estimated Blood Loss:  100 mL    Crystalloid:  1200 mL    Albumin:  250 mL    Urine:  507 mL    Complications:  None apparent. Specimens:   ID Type Source Tests Collected by Time Destination   1 : Sigmoid colon and proximal rectum Fresh Abdomen  Kylah Dhillon MD 5/7/2020 1557 Pathology   2 : Anastomotic Donuts Fresh Abdomen  Kylah Dhillon MD 5/7/2020 1631 Pathology        Implants:  None. Drains:  10 mm Scott-Morgan drain in the pelvis    Findings:  Small residual proximal rectal neoplasm. No evidence of metastatic disease. Uterine fibroids. Normal ovaries.     Electronically Signed by Bhavik Diaz MD on 5/7/2020

## 2020-05-07 NOTE — BRIEF OP NOTE
Brief Postoperative Note    Patient: Sharmila Tejada  YOB: 1970  MRN: [id number]    Date of Procedure: 5/7/2020     Pre-Op Diagnosis: RECTAL CANCER    Post-Op Diagnosis: Same as preoperative diagnosis. Procedure(s):  CYSTOSCOPY WITH INSERTION  OF  BILATERAL URETERAL STENTS    Surgeon: Marc Hill MD    Surgical Assistant: None    Anesthesia: General     Estimated Blood Loss (mL): Minimal for this portion of procedure    Complications: None    Specimens: None    Implants: None    Drains:   16 Fr Smith catheter  Right 5Fr open ended ureteral catheter  Left 5Fr open ended ureteral catheter    Findings: Normal cystourethroscopy. Successful bilateral open ended ureteral catheter/stent placement.     Electronically Signed by Arie Rutledge MD on 5/7/2020 at 11:13 AM

## 2020-05-07 NOTE — OP NOTES
Preoperative Diagnosis: Rectal cancer    Postoperative Diagnosis:  Same    Procedure(s) Performed:    1. Cystourethroscopy  2. Bilateral insertion of open ended ureteral stents/catheters  3. Simple Smith catheter placement    Surgeon:  Marni Adorno MD    Assistant(s):  None    Anesthesia:  General    Fluids:  See anesthesia record    Estimated blood loss:  <5 mL    Specimens:  None    Cultures:  None    Implants: None    Tubes/Lines/Drains:    16Fr Smith catheter  RIGHT 5Fr open ended ureteral catheter  LEFT 5Fr open ended ureteral catheter, cut obliquely at the distal end to help identify    Complications:  None    Indications: 47 y.o. female with a past medical history of rectal cancer. She presents today for SHEILA LAR. We have been asked to place bilateral ureteral stents prior to surgical intervention. Risks & benefits of the procedure discussed with the patient, who wishes to proceed. Findings:  Normal cystourethroscopy. Large posterior bladder wall indentation from fibroid uterus. Successful bilateral open ended ureteral stent placement. Description:  The patient was correctly identified in the preop holding area where written informed consent as well as potential risks and complications were reviewed. She agreed. She was brought back to the operative suite. Once correct information was verified, general anesthesia was induced. She was then gently placed into dorsal lithotomy position with SCDs in place for VTE prophylaxis. She was prepped and draped in the usual sterile fashion. Perioperative antibiotics were withheld until the time of Dr. Mary Saez portion of the procedure per his request. A surgical timeout was performed. We inserted a 21F rigid cystoscope per urethra with copious lubrication and normal saline irrigation running. This demonstrated a normal urethra and bladder with no intravesical tumors, mucosal irregularities, foreign bodies or stones noted.   There was a large posterior indentation of the bladder wall from her fibroid uterus    We began by cannulating the right ureteral orifice with the combination of a 5Fr open ended catheter and a sensor wire. The wire was placed into the right kidney until gentle resistance was encountered. The open ended catheter was then advanced over the sensor wire up the right ureter, measuring 22cm at the right ureteral orifice. The sensor wire was removed. We then placed a contralateral open ended ureteral catheter in the exact same fashion. At this point the bladder was left full and all instrumentation was removed. A 16Fr Smith catheter was placed in the bladder. The open ended ureteral stents were affixed to the catheter with silk ties, blue clips, and an adapter. The patient was then turned over to the primary surgical team.     Post Op Plan:    -- Can remove stents either at the conclusion of the procedure or at the time of Smith catheter removal. Timing per primary team  -- Thank you for allowing us to assist with this patient's care. Please call us with any questions or concerns.     Lexi Bey MD  [facility]

## 2020-05-07 NOTE — PROGRESS NOTES
TRANSFER - IN REPORT:    Verbal report received from Jacy Milan RN(name) on Deejay Dias  being received from PACU(unit) for routine post - op      Report consisted of patients Situation, Background, Assessment and   Recommendations(SBAR). Information from the following report(s) SBAR, Kardex, Intake/Output, MAR and Recent Results was reviewed with the receiving nurse. Opportunity for questions and clarification was provided. Assessment completed upon patients arrival to unit and care assumed.

## 2020-05-07 NOTE — H&P
Colorectal Surgery Pre-Operative History and Physical Examination Note          NAME:  Kimberly Reagan   :   1970   MRN:   [id number]     Operation Date: 2020    Chief Complaint:  Rectal cancer     History of Present Illness: The patient is a 42-year-old female who had been experiencing hematochezia for approximately 1 ½ years. Eventually, she saw Dr. Sherrell Ahumada who performed a colonoscopy on her on 3/25/2020. That procedure revealed diverticulosis from the mid ascending colon to the mid sigmoid colon, a 3 mm distal rectal polyp that was completely excised, and a 4 cm polyp of the proximal rectum that appeared to have been completely excised as well. Because of the size of the polyp, the polypectomy site was tattooed with [place] ink. 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 A CEA level was obtained on 3/31/2020 and found to be within normal limits (2.2 ng/mL). A flexible sigmoidoscopy with rectal endoscopic ultrasound and some additional biopsies was performed by Dr. Miryam Morejon on 2020, and it revealed the polypectomy site and a small residual portion of the proximal rectal neoplasm. Biopsies of the proximal lesion were consistent with tubular adenoma, and those of the polypectomy site itself were benign with a prominent mucosal lymphoid aggregate. The endoscopic ultrasound findings were consistent with normal muscularis propria (uT1) and no apparent lymphadenopathy (uN0). A CT scan of the abdomen and pelvis with oral and IV contrast was performed on 2020, and it revealed uterine fibroids but no evidence of metastatic disease. She reports that she usually moves her bowels once every other day without straining and that the stools are formed. She has no pain. She denies experiencing any fecal incontinence.     Surgery had been planned, and it was originally scheduled for 2020. Because of the COVID-19 pandemic, it has been rescheduled four times. Finally, now that we have the ability to test pre-operative patients for the virus and she has tested negative, we are ready to proceed. PMH:  Past Medical History:   Diagnosis Date    Diverticulosis     Fibroids     Uterine fibroids    Hypercholesterolemia     Hypertension     Obesity (BMI 35.0-39.9 without comorbidity)     Rectal cancer (HCC)        PSH:  Past Surgical History:   Procedure Laterality Date    FLEXIBLE SIGMOIDOSCOPY N/A 2020    Flexible sigmoidoscopy and rectal endoscopic ultrasound; Isaac Gómez MD    HX  SECTION  2006    HX COLONOSCOPY  2020    Abel Quijano MD    HX GYN  2003    Uterine myomectomy for fibroids; Brunilda Vilchis MD    HX LAP CHOLECYSTECTOMY      HX ORTHOPAEDIC Left 2005    ORIF ANKLE       Home Medications:  Cannot display prior to admission medications because the patient has not been admitted in this contact. Hospital Medications:  No current facility-administered medications for this encounter. Current Outpatient Medications   Medication Sig    doxylamine succinate (Sleep Aid, Doxylamine,) 25 mg tablet Take 25 mg by mouth nightly. TAKES UP TO THREE TABS NIGHTLY.  hydroCHLOROthiazide (HYDRODIURIL) 12.5 mg tablet TAKE 1 TABLET DAILY    naltrexone-buPROPion (CONTRAVE) 8-90 mg TbER ER tablet First Month: Contrave 8mg/90mg #70  Week 1 : 1 Tab AM  Week 2 : 1 Tab AM, 1 Tab PM  Week 3 : 2 Tab AM, 1 Tab PM  Week 4 : 2 Tab AM, 2 Tab PM  Week 5 and Beyond: Contrave 8mg/90mg #120   2 Tab AM, 2 Tab PM    atorvastatin (LIPITOR) 40 mg tablet TAKE 1 TABLET DAILY (NEED  OFFICE VISIT FOR FUTURE    MEDICATION REFILL) (Patient taking differently: nightly. TAKE 1 TABLET DAILY (NEED OFFICE VISIT FOR FUTURE MEDICATION REFILL))    TAYTULLA 1 mg-20 mcg (24)/75 mg (4) cap nightly.     naproxen (NAPROSYN) 500 mg tablet Take 1 Tab by mouth two (2) times daily (with meals). Allergies: Allergies   Allergen Reactions    Percocet [Oxycodone-Acetaminophen] Itching       Family History:  Family History   Problem Relation Age of Onset    Asthma Mother     Elevated Lipids Father     Hypertension Father     Cancer Father         anal cancer    Diabetes Paternal Aunt     Cancer Maternal Grandmother         colon    Cancer Paternal Grandmother         leukemia    Anesth Problems Neg Hx        Social History:  Social History     Tobacco Use    Smoking status: Former Smoker     Last attempt to quit: 2013     Years since quittin.3    Smokeless tobacco: Never Used    Tobacco comment: ONE PACK/WEEK   Substance Use Topics    Alcohol use: Yes     Comment: occassional       Review of Systems:    Symptom Y/N Comments  Symptom Y/N Comments   Fever/Chills N   Chest Pain N    Cough N   Abdominal Pain N    Sputum N   Joint Pain     SOB/HICKS N   Pruritis/Rash     Nausea/vomit N   Tolerating PT/OT     Diarrhea N   Tolerating Diet Y    Constipation N   Other       Could NOT obtain due to:        Objective:   No data found. No intake/output data recorded. No intake/output data recorded. PHYSICAL EXAMINATION:    GENERAL:  No distress. HEENT:  Anicteric. LUNGS:  Clear to auscultation bilaterally. HEART:  Regular rate and rhythm with no murmurs, gallops, or rubs. ABDOMEN:  Non-tender. Pfannensteil-type scar. NEURO:  Alert and oriented. Data Review   3/31/2020:  Hgb 12.4 g/dL; Platelets 684 K/uL; Creatinine 1.10 mg/dL; Albumin 4.2 g/dL; CEA 2.2 ng/mL    2020:  SARS-CoV2 Not Detected. Assessment and Plan:        The patient has adenocarcinoma of the proximal rectum and, based on the available information, the pre-treatment stage appears to be [id number] (stage I).       We have had a detailed discussion that included rectal cancer staging, the limitations of the staging procedures, the standard surgical treatment options for her disease, the preparation for surgery, the risks of surgery, and the recovery expectations. The risks discussed included but were not limited to bleeding (possibly requiring blood transfusion or return to the operating room), infection (wound, urinary tract, intraabdominal or pelvic, pulmonary; COVID-19), anastomotic leakage (possibly requiring the creation of a temporary ostomy), anastomotic stricture, injury to other organs or structures including ureters, urinary bladder, small intestine, and spleen (possibly requiring splenectomy), small bowel obstruction, hernia formation, functional problems, myocardial infarction, stroke, deep venous thrombosis, pulmonary embolism, and death. It has been explained that although a hand-assisted laparoscopic operation is planned it could become necessary to convert to the open operation. It has also been explained that a urologist would be consulted to perform the cystoscopic placement of temporary bilateral ureteral catheters in order to facilitate intraoperative identification of the ureters. The patient has been counseled at length about the risks of lewis COVID-19 in the darin-operative and post-operative states including the recovery window of their procedure. She has been made aware that lewis COVID-19 after a surgical procedure may worsen their prognosis for recovering from the virus and lend to a higher morbidity and or mortality risk. She has been given the options of postponing the procedure. All of the risks, benefits, and alternatives have been discussed, and the patient has agreed to proceed.       Principal Problem:    Rectal cancer ([facility] [address] (4/8/2020)

## 2020-05-07 NOTE — PROGRESS NOTES
Patient laying in bed. Pain 2/10 declining medications at this time. VSS on 2L of O2 NC. Lung sounds and heart sounds clear.

## 2020-05-07 NOTE — ANESTHESIA POSTPROCEDURE EVALUATION
Procedure(s):  CYSTOSCOPY PLACEMENT BILATERAL URETERAL CATHETERS, HAND ASSISTED LAPAROSCOPIC POSSIBLE OPEN LOW ANTERIOR RESECTION WITH MOBILIZATION OF SPLENIC FLEXTURE AND COLOPROCTOSTOMY (E R A S)  CYSTOSCOPY WITH INSERTION  OF  BILATERAL URETERAL STENTS. No value filed. Anesthesia Post Evaluation      Multimodal analgesia: multimodal analgesia used between 6 hours prior to anesthesia start to PACU discharge  Patient location during evaluation: bedside  Patient participation: waiting for patient participation  Level of consciousness: awake  Pain management: adequate  Airway patency: patent  Anesthetic complications: no  Cardiovascular status: acceptable  Respiratory status: unassisted  Hydration status: acceptable  Comments: Post-Anesthesia Evaluation and Assessment    I have evaluated the patient and they are ready for PACU discharge. Patient: Brianna Franks MRN: [id number]  SSN: [id number]   YOB: 1970  Age: 47 y.o. Sex: female      Cardiovascular Function/Vital Signs  /58   Pulse 83   Temp 36.9 °C (98.4 °F)   Resp 14   Ht 5' 3\" (1.6 m)   Wt 89.2 kg (196 lb 10.4 oz)   SpO2 96%   BMI 34.84 kg/m²     Patient is status post General anesthesia for Procedure(s):  CYSTOSCOPY PLACEMENT BILATERAL URETERAL CATHETERS, HAND ASSISTED LAPAROSCOPIC POSSIBLE OPEN LOW ANTERIOR RESECTION WITH MOBILIZATION OF SPLENIC FLEXTURE AND COLOPROCTOSTOMY (E R A S)  CYSTOSCOPY WITH INSERTION  OF  BILATERAL URETERAL STENTS. Nausea/Vomiting: None    Postoperative hydration reviewed and adequate.     Pain:  Pain Scale 1: Visual (05/07/20 1732)  Pain Intensity 1: 0 (05/07/20 1732)   Managed    Neurological Status:   Neuro (WDL): Exceptions to WDL (05/07/20 1732)  Neuro  Neurologic State: Sleeping (05/07/20 1732)  LUE Motor Response: Spontaneous  (05/07/20 1732)  RUE Motor Response: Spontaneous  (05/07/20 1732)   At baseline    Mental Status, Level of Consciousness: Alert and  oriented to person, place, and time    Pulmonary Status:   O2 Device: Non-rebreather mask;Oral airway (05/07/20 4807)   Adequate oxygenation and airway patent    Complications related to anesthesia: None    Post-anesthesia assessment completed. No concerns    Signed By: Medardo Hong MD    May 7, 2020                   Vitals Value Taken Time   /58 5/7/2020  5:35 PM   Temp 36.9 °C (98.4 °F) 5/7/2020  5:32 PM   Pulse 84 5/7/2020  5:55 PM   Resp 17 5/7/2020  5:55 PM   SpO2 99 % 5/7/2020  5:55 PM   Vitals shown include unvalidated device data.

## 2020-05-07 NOTE — ROUTINE PROCESS
Bedside shift change report given to Triston Gleason 794 (oncoming nurse) by Jose Carranza RN (offgoing nurse). Report included the following information SBAR, Kardex, Intake/Output, MAR and Recent Results.

## 2020-05-07 NOTE — ANESTHESIA PREPROCEDURE EVALUATION
Relevant Problems   No relevant active problems       Anesthetic History   No history of anesthetic complications            Review of Systems / Medical History  Patient summary reviewed, nursing notes reviewed and pertinent labs reviewed    Pulmonary                Comments: Smoking Status: Former Smoker  Quit Smokin13     Neuro/Psych         Headaches and psychiatric history     Cardiovascular    Hypertension: well controlled          Hyperlipidemia    Exercise tolerance: >4 METS     GI/Hepatic/Renal               Comments: Diverticulosis (K57.90)    Rectal cancer (HCC) (C20)     Endo/Other        Obesity and cancer     Other Findings              Physical Exam    Airway  Mallampati: II  TM Distance: > 6 cm  Neck ROM: normal range of motion   Mouth opening: Normal     Cardiovascular    Rhythm: regular  Rate: normal         Dental  No notable dental hx       Pulmonary  Breath sounds clear to auscultation               Abdominal         Other Findings            Anesthetic Plan    ASA: 2  Anesthesia type: general          Induction: Intravenous  Anesthetic plan and risks discussed with: Patient

## 2020-05-07 NOTE — PERIOP NOTES
TRANSFER - OUT REPORT:    Verbal [facility] to [address] [place] [address] (unit) for routine post - op       Report consisted of patients Situation, Background, Assessment and   Recommendations(SBAR). Time Pre op antibiotic given:1219  Anesthesia Stop time: 1871  Smith Present on Transfer to floor:yes  Order for Smith on Chart:yes  Discharge Prescriptions with Chart:no    Information from the following report(s) SBAR, OR Summary, Procedure Summary, Intake/Output, MAR, Recent Results and Cardiac Rhythm NSR was reviewed with the receiving nurse. Opportunity for questions and clarification was provided. Is the patient on 02? YES       L/Min 2    Is the patient on a monitor? NO    Is the nurse transporting with the patient? NO    Surgical Waiting Area notified of patient's transfer from PACU? YES      The following personal items collected during your admission accompanied patient upon transfer:   Dental Appliance:    Vision:    Hearing Aid:    Jewelry: Jewelry: None  Clothing: Clothing: (clothing to PACU)  Other Valuables: Other Valuables: Cell Phone, RaAV[address] sent to safe: Personal Items Sent to Safe: #[id number]      ** Bag of belongings with patient on bed upon exit from PACU. **    ** Patient too drowsy for standing scale maneuver in PACU.

## 2020-05-08 LAB
ANION GAP SERPL CALC-SCNC: 6 MMOL/L (ref 5–15)
BUN SERPL-MCNC: 15 MG/DL (ref 6–20)
BUN/CREAT SERPL: 10 (ref 12–20)
CALCIUM SERPL-MCNC: 8.4 MG/DL (ref 8.5–10.1)
CHLORIDE SERPL-SCNC: 108 MMOL/L (ref 97–108)
CO2 SERPL-SCNC: 21 MMOL/L (ref 21–32)
CREAT SERPL-MCNC: 1.53 MG/DL (ref 0.55–1.02)
ERYTHROCYTE [DISTWIDTH] IN BLOOD BY AUTOMATED COUNT: 11.9 % (ref 11.5–14.5)
GLUCOSE SERPL-MCNC: 127 MG/DL (ref 65–100)
HCT VFR BLD AUTO: 35.8 % (ref 35–47)
HGB BLD-MCNC: 11.4 G/DL (ref 11.5–16)
MAGNESIUM SERPL-MCNC: 2.6 MG/DL (ref 1.6–2.4)
MCH RBC QN AUTO: 29.6 PG (ref 26–34)
MCHC RBC AUTO-ENTMCNC: 31.8 G/DL (ref 30–36.5)
MCV RBC AUTO: 93 FL (ref 80–99)
NRBC # BLD: 0 K/UL (ref 0–0.01)
NRBC BLD-RTO: 0 PER 100 WBC
PHOSPHATE SERPL-MCNC: 1.6 MG/DL (ref 2.6–4.7)
PLATELET # BLD AUTO: 278 K/UL (ref 150–400)
PMV BLD AUTO: 9.5 FL (ref 8.9–12.9)
POTASSIUM SERPL-SCNC: 3.9 MMOL/L (ref 3.5–5.1)
RBC # BLD AUTO: 3.85 M/UL (ref 3.8–5.2)
SODIUM SERPL-SCNC: 135 MMOL/L (ref 136–145)
WBC # BLD AUTO: 15.6 K/UL (ref 3.6–11)

## 2020-05-08 PROCEDURE — 74011000250 HC RX REV CODE- 250: Performed by: COLON & RECTAL SURGERY

## 2020-05-08 PROCEDURE — 74011250637 HC RX REV CODE- 250/637: Performed by: COLON & RECTAL SURGERY

## 2020-05-08 PROCEDURE — 74011250636 HC RX REV CODE- 250/636: Performed by: COLON & RECTAL SURGERY

## 2020-05-08 PROCEDURE — 65270000029 HC RM PRIVATE

## 2020-05-08 PROCEDURE — 80048 BASIC METABOLIC PNL TOTAL CA: CPT

## 2020-05-08 PROCEDURE — 88360 TUMOR IMMUNOHISTOCHEM/MANUAL: CPT

## 2020-05-08 PROCEDURE — 85027 COMPLETE CBC AUTOMATED: CPT

## 2020-05-08 PROCEDURE — 36415 COLL VENOUS BLD VENIPUNCTURE: CPT

## 2020-05-08 PROCEDURE — 84100 ASSAY OF PHOSPHORUS: CPT

## 2020-05-08 PROCEDURE — 97161 PT EVAL LOW COMPLEX 20 MIN: CPT

## 2020-05-08 PROCEDURE — 88309 TISSUE EXAM BY PATHOLOGIST: CPT

## 2020-05-08 PROCEDURE — 83735 ASSAY OF MAGNESIUM: CPT

## 2020-05-08 PROCEDURE — 97116 GAIT TRAINING THERAPY: CPT

## 2020-05-08 PROCEDURE — 88305 TISSUE EXAM BY PATHOLOGIST: CPT

## 2020-05-08 RX ORDER — HYDROMORPHONE HYDROCHLORIDE 2 MG/1
2-4 TABLET ORAL
Status: DISCONTINUED | OUTPATIENT
Start: 2020-05-08 | End: 2020-05-10 | Stop reason: HOSPADM

## 2020-05-08 RX ADMIN — ACETAMINOPHEN 1000 MG: 500 TABLET, FILM COATED ORAL at 18:34

## 2020-05-08 RX ADMIN — KETOROLAC TROMETHAMINE 15 MG: 30 INJECTION, SOLUTION INTRAMUSCULAR at 03:12

## 2020-05-08 RX ADMIN — SODIUM CHLORIDE, SODIUM LACTATE, POTASSIUM CHLORIDE, AND CALCIUM CHLORIDE 75 ML/HR: 600; 310; 30; 20 INJECTION, SOLUTION INTRAVENOUS at 13:27

## 2020-05-08 RX ADMIN — HYDROMORPHONE HYDROCHLORIDE 2 MG: 2 TABLET ORAL at 18:34

## 2020-05-08 RX ADMIN — ACETAMINOPHEN 1000 MG: 500 TABLET, FILM COATED ORAL at 11:57

## 2020-05-08 RX ADMIN — HYDROMORPHONE HYDROCHLORIDE 2 MG: 2 TABLET ORAL at 05:42

## 2020-05-08 RX ADMIN — SODIUM CHLORIDE, SODIUM LACTATE, POTASSIUM CHLORIDE, AND CALCIUM CHLORIDE 75 ML/HR: 600; 310; 30; 20 INJECTION, SOLUTION INTRAVENOUS at 00:44

## 2020-05-08 RX ADMIN — ENOXAPARIN SODIUM 40 MG: 40 INJECTION SUBCUTANEOUS at 11:47

## 2020-05-08 RX ADMIN — CELECOXIB 100 MG: 100 CAPSULE ORAL at 20:25

## 2020-05-08 RX ADMIN — KETOROLAC TROMETHAMINE 15 MG: 30 INJECTION, SOLUTION INTRAMUSCULAR at 16:11

## 2020-05-08 RX ADMIN — ACETAMINOPHEN 1000 MG: 500 TABLET, FILM COATED ORAL at 06:12

## 2020-05-08 RX ADMIN — HYDROCHLOROTHIAZIDE 12.5 MG: 25 TABLET ORAL at 09:52

## 2020-05-08 RX ADMIN — LIDOCAINE HYDROCHLORIDE 1 MG/KG/HR: 8 INJECTION, SOLUTION INTRAVENOUS at 18:41

## 2020-05-08 RX ADMIN — NORETHINDRONE ACETATE AND ETHINYL ESTRADIOL, AND FERROUS FUMARATE 1 TABLET: KIT at 00:44

## 2020-05-08 RX ADMIN — HYDROMORPHONE HYDROCHLORIDE 2 MG: 2 TABLET ORAL at 11:57

## 2020-05-08 RX ADMIN — ALVIMOPAN 12 MG: 12 CAPSULE ORAL at 18:34

## 2020-05-08 RX ADMIN — HYDROMORPHONE HYDROCHLORIDE 2 MG: 2 TABLET ORAL at 01:09

## 2020-05-08 RX ADMIN — ALVIMOPAN 12 MG: 12 CAPSULE ORAL at 09:52

## 2020-05-08 RX ADMIN — NORETHINDRONE ACETATE AND ETHINYL ESTRADIOL, AND FERROUS FUMARATE 1 TABLET: KIT at 20:25

## 2020-05-08 RX ADMIN — POTASSIUM PHOSPHATE, MONOBASIC AND POTASSIUM PHOSPHATE, DIBASIC: 224; 236 INJECTION, SOLUTION, CONCENTRATE INTRAVENOUS at 11:49

## 2020-05-08 RX ADMIN — KETOROLAC TROMETHAMINE 15 MG: 30 INJECTION, SOLUTION INTRAMUSCULAR at 09:52

## 2020-05-08 NOTE — PROGRESS NOTES
NUTRITION EDUCATION BRIEF       Pt s/p lower resection for rectal CA. Diet advancing to low fiber for lunch, tolerated clear liquids with no issue. Low fiber diet education provided with handouts given. Good understanding of information with no questions at this time. Discussed continuation of supplements after discharge as needed.      Tacho Cordero RD

## 2020-05-08 NOTE — PROGRESS NOTES
Pt walked 1 full lap on [address]. Tolerated walk well. No complaints of dizziness. Pt currently up in chair. Pt stating that dilaudid did not help pain. This nurse asked if she wanted the doctor to be called to address this and pt said that she would wait and tell him in the morning. Pt also stating that she did not sleep that well and is asking for a different bed if possible today. This nurse will address this request with day shift nurse.

## 2020-05-08 NOTE — PROGRESS NOTES
Pt walked 1/4 lap on [address]. Pt denied dizziness but was feeling weak and \"whoozy\". Pt returned to bed. VS stable. Will continue to monitor and attempt to walk later in the morning. Pt complaining of pain after walk. 5/10. Pt was given dilaudid. This nurse was told that pt's collins was putting out pink tinged urine. Pt's urine is more of a bloody color on shift. Will continue to monitor as pt has been moving more. Pt taken off of O2 and O2 sat. At 96%. Pt denies any SOB or trouble breathing.

## 2020-05-08 NOTE — PROGRESS NOTES
General Daily Progress Note    Admission Date: 5/7/2020  Hospital Day 2  Post-Op Day 1  Subjective:   Pain control needs improvement. Dilaudid did not seem to be helping. The mattress is very uncomfortable. Hungry. No nausea. Walked in the halls. Objective:     Patient Vitals for the past 24 hrs:   BP Temp Pulse Resp SpO2 Height Weight   05/08/20 0837 121/66 98.3 °F (36.8 °C) (!) 102 16 98 %     05/08/20 0617       90.4 kg (199 lb 6.4 oz)   05/08/20 0400 115/74 98.8 °F (37.1 °C) (!) 105 16 95 %     05/08/20 0003 137/84 97.5 °F (36.4 °C) 99 16 99 %     05/07/20 2021 135/84 98.6 °F (37 °C) 93 16 99 %     05/07/20 1916 131/70 98 °F (36.7 °C) 89 16 100 %     05/07/20 1850   85 15 100 %     05/07/20 1845 139/84 98 °F (36.7 °C) 91 16 100 %     05/07/20 1840   88 15 99 %     05/07/20 1835   86 16 100 %     05/07/20 1830 102/84  85 15 100 %     05/07/20 1825   86 19 100 %     05/07/20 1820   84 16 98 %     05/07/20 1815 133/71  82 14 100 %     05/07/20 1810 133/69  85 19 100 %     05/07/20 1805 132/74  86 13 100 %     05/07/20 1800 128/73  82 16 100 %     05/07/20 1755   84 17 99 %     05/07/20 1750   85 20 100 %     05/07/20 1745   83 14 96 %     05/07/20 1740   88 18 97 %     05/07/20 1735 129/58  89 17 100 %     05/07/20 1732 122/69 98.4 °F (36.9 °C) 85 22 100 %     05/07/20 1730 122/69  86 17 100 %     05/07/20 1045 151/88 98.4 °F (36.9 °C) 95 14 98 % 5' 3\" (1.6 m) 89.2 kg (196 lb 10.4 oz)     No intake/output data recorded. 05/06 1901 - 05/08 0700  In: 2079.7 [I.V.:2079.7]  Out: 8635 [Urine:1055; Drains:70]    Physical Examination:  General Appearance - Somewhat uncomfortable. Abdomen - Non-distended. Appropriately tender. Incisions clean, dry, intact, and without erythema. KELLY drainage serosanguinous.  - Smith catheter draining somewhat bloody, moderately concentrated urine.   Extremities - Elastic and pneumatic compression stockings in use. Data Review   Recent Results (from the past 24 hour(s))   POC HEMOGLOBIN    Collection Time: 05/07/20 11:18 AM   Result Value Ref Range    Hemoglobin (POC) 14.1 11.5 - 16.0 g/dL   HCG URINE, QL. - POC    Collection Time: 05/07/20 11:21 AM   Result Value Ref Range    Pregnancy test,urine (POC) Negative NEG     CBC WITH AUTOMATED DIFF    Collection Time: 05/07/20  5:35 PM   Result Value Ref Range    WBC 12.3 (H) 3.6 - 11.0 K/uL    RBC 4.09 3.80 - 5.20 M/uL    HGB 12.3 11.5 - 16.0 g/dL    HCT 38.4 35.0 - 47.0 %    MCV 93.9 80.0 - 99.0 FL    MCH 30.1 26.0 - 34.0 PG    MCHC 32.0 30.0 - 36.5 g/dL    RDW 11.9 11.5 - 14.5 %    PLATELET 496 154 - 825 K/uL    MPV 9.5 8.9 - 12.9 FL    NRBC 0.0 0  WBC    ABSOLUTE NRBC 0.00 0.00 - 0.01 K/uL    NEUTROPHILS 85 (H) 32 - 75 %    LYMPHOCYTES 10 (L) 12 - 49 %    MONOCYTES 4 (L) 5 - 13 %    EOSINOPHILS 0 0 - 7 %    BASOPHILS 0 0 - 1 %    IMMATURE GRANULOCYTES 1 (H) 0.0 - 0.5 %    ABS. NEUTROPHILS 10.4 (H) 1.8 - 8.0 K/UL    ABS. LYMPHOCYTES 1.3 0.8 - 3.5 K/UL    ABS. MONOCYTES 0.5 0.0 - 1.0 K/UL    ABS. EOSINOPHILS 0.0 0.0 - 0.4 K/UL    ABS. BASOPHILS 0.0 0.0 - 0.1 K/UL    ABS. IMM.  GRANS. 0.1 (H) 0.00 - 0.04 K/UL    DF AUTOMATED     METABOLIC PANEL, BASIC    Collection Time: 05/07/20  5:35 PM   Result Value Ref Range    Sodium 138 136 - 145 mmol/L    Potassium 4.5 3.5 - 5.1 mmol/L    Chloride 106 97 - 108 mmol/L    CO2 26 21 - 32 mmol/L    Anion gap 6 5 - 15 mmol/L    Glucose 145 (H) 65 - 100 mg/dL    BUN 12 6 - 20 MG/DL    Creatinine 1.56 (H) 0.55 - 1.02 MG/DL    BUN/Creatinine ratio 8 (L) 12 - 20      GFR est AA 43 (L) >60 ml/min/1.73m2    GFR est non-AA 35 (L) >60 ml/min/1.73m2    Calcium 8.1 (L) 8.5 - 10.1 MG/DL   MAGNESIUM    Collection Time: 05/07/20  5:35 PM   Result Value Ref Range    Magnesium 2.8 (H) 1.6 - 2.4 mg/dL   PHOSPHORUS    Collection Time: 05/07/20  5:35 PM   Result Value Ref Range    Phosphorus 3.2 2.6 - 4.7 MG/DL   CBC W/O DIFF Collection Time: 05/08/20  5:26 AM   Result Value Ref Range    WBC 15.6 (H) 3.6 - 11.0 K/uL    RBC 3.85 3.80 - 5.20 M/uL    HGB 11.4 (L) 11.5 - 16.0 g/dL    HCT 35.8 35.0 - 47.0 %    MCV 93.0 80.0 - 99.0 FL    MCH 29.6 26.0 - 34.0 PG    MCHC 31.8 30.0 - 36.5 g/dL    RDW 11.9 11.5 - 14.5 %    PLATELET 716 907 - 070 K/uL    MPV 9.5 8.9 - 12.9 FL    NRBC 0.0 0  WBC    ABSOLUTE NRBC 0.00 0.00 - 3.37 K/uL   METABOLIC PANEL, BASIC    Collection Time: 05/08/20  5:26 AM   Result Value Ref Range    Sodium 135 (L) 136 - 145 mmol/L    Potassium 3.9 3.5 - 5.1 mmol/L    Chloride 108 97 - 108 mmol/L    CO2 21 21 - 32 mmol/L    Anion gap 6 5 - 15 mmol/L    Glucose 127 (H) 65 - 100 mg/dL    BUN 15 6 - 20 MG/DL    Creatinine 1.53 (H) 0.55 - 1.02 MG/DL    BUN/Creatinine ratio 10 (L) 12 - 20      GFR est AA 44 (L) >60 ml/min/1.73m2    GFR est non-AA 36 (L) >60 ml/min/1.73m2    Calcium 8.4 (L) 8.5 - 10.1 MG/DL   MAGNESIUM    Collection Time: 05/08/20  5:26 AM   Result Value Ref Range    Magnesium 2.6 (H) 1.6 - 2.4 mg/dL   PHOSPHORUS    Collection Time: 05/08/20  5:26 AM   Result Value Ref Range    Phosphorus 1.6 (L) 2.6 - 4.7 MG/DL           Assessment:     Principal Problem:    Rectal cancer (HCC) (4/8/2020)        1 Day Post-Op s/p hand-assisted laparoscopic low anterior resection, mobilization of the splenic flexure, and coloproctostomy. Hemodynamically stable. Hemoglobin adequate. Urine output adequate. Mildly elevated creatinine consistent with acute kidney injury based on 35% reduction in estimated GFR, but I do not believe that the Toradol or Celebrex should be held at this time. Leukocytosis is most likely reactive. Hypophosphatemic. Needs better pain control. Contrave might be reducing the efficacy of the Dilaudid. Plan:   Begin low fiber diet with caution. Hold Contrave. Replace phosphorus. Remove collins catheter per protocol. Ambulate. Physical therapy. Incentive spirometer.

## 2020-05-08 NOTE — PROGRESS NOTES
TYLER:  -home when stable    Reason for Admission: Hand-assisted laparoscopic low anterior resection, mobilization of the splenic flexure, and coloproctostomy. RUR Score: 9%                    Plan for utilizing home health:     Not indicated at this time     PCP: First and Last name:  Osbaldo Boucher   Name of Practice:    Are you a current patient: Yes/No: yes   Approximate date of last visit:                     Current Advanced Directive/Advance Care Plan: Patient does not have, was not interested in it at this time. Transition of Care Plan:   CM met with patient at the bedside to explain role and offer support. Patient is alert and oriented x4, confirmed demographics. Patient lives with her fiance and is independent with ADLs. She has been seen by PT and has no needs at discharge. Care Management Interventions  PCP Verified by CM: Yes  Palliative Care Criteria Met (RRAT>21 & CHF Dx)?: No  MyChart Signup: No  Discharge Durable Medical Equipment: No  Health Maintenance Reviewed: Yes  Physical Therapy Consult: Yes  Occupational Therapy Consult: No  Speech Therapy Consult: No  Current Support Network: Own Home  Confirm Follow Up Transport: Family  Discharge Location  Discharge Placement: Home with family assistance    Mini Love

## 2020-05-08 NOTE — PROGRESS NOTES
PHYSICAL THERAPY EVALUATION/DISCHARGE  Patient: Angi Marroquin (28 y.o. female)  Date: 5/8/2020  Primary Diagnosis: Rectal cancer ([facility] [address] [C20]  Procedure(s) (LRB):  CYSTOSCOPY PLACEMENT BILATERAL URETERAL CATHETERS, HAND ASSISTED LAPAROSCOPIC POSSIBLE OPEN LOW ANTERIOR RESECTION WITH MOBILIZATION OF SPLENIC FLEXTURE AND COLOPROCTOSTOMY (E R A S) (N/A)  CYSTOSCOPY WITH INSERTION  OF  BILATERAL URETERAL STENTS (Bilateral) 1 Day Post-Op   Precautions:          ASSESSMENT  Based on the objective data described below, the patient presents with well controlled pain, good overall strength, ROM, and endurance following admission for a colon resection. She was received in a bedside recliner having walked in the hallway earlier today. Encouraged ambulation for bowel recovery and to maintain/build endurance while healing. Gait training x300' requiring SBA with intermittent use of her IV pole for balance and 2 short standing rest breaks. Mobility deficits appear to be lack of confidence in mobility > true deficits. Mildly  noted without support but no LOB. Discussed activity for recovery and the patient endorsed near baseline functional mobility. She felt that she was moving well (agree) and that follow up therapy was not needed. Will sign off with no follow up needs identified. Please re-consult if further concerns arise. Further skilled acute physical therapy is not indicated at this time. PLAN :  Recommendation for discharge: (in order for the patient to meet his/her long term goals)  No skilled physical therapy/ follow up rehabilitation needs identified at this time. This discharge recommendation:  Has been made in collaboration with the attending provider and/or case management    IF patient discharges home will need the following DME: none       SUBJECTIVE:   Patient stated This will be the 3rd time I have walked today.     OBJECTIVE DATA SUMMARY:   HISTORY:    Past Medical History: Diagnosis Date    Diverticulosis     Fibroids     Uterine fibroids    Hypercholesterolemia     Hypertension     Obesity (BMI 35.0-39.9 without comorbidity)     Rectal [facility])      Past Surgical History:   Procedure Laterality Date    FLEXIBLE SIGMOIDOSCOPY N/A 2020    Flexible sigmoidoscopy and rectal endoscopic ultrasound; Maria A Guerrero MD    HX  SECTION  2006    HX COLONOSCOPY  2020    Elliot Frazier MD    HX GYN  2003    Uterine myomectomy for fibroids; Rowan Brown MD    HX LAP CHOLECYSTECTOMY      HX ORTHOPAEDIC Left 2005    ORIF ANKLE       Prior level of function: independent  Personal factors and/or comorbidities impacting plan of care:     Home Situation  Home Environment: Private residence  # Steps to Enter: 5  Rails to Enter: Yes  One/Two Story Residence: Two story  Living Alone: No  Support Systems: Spouse/Significant Other/Partner, Child(tarun)(mother)  Current DME Used/Available at Home: Cane, quad, Blood pressure cuff    EXAMINATION/PRESENTATION/DECISION MAKING:   Critical Behavior:  Neurologic State: Alert  Orientation Level: Oriented X4  Cognition: Follows commands     Hearing:     Skin:    Edema:   Range Of Motion:  AROM: Within functional limits                       Strength:    Strength: Within functional limits                    Tone & Sensation:   Tone: Normal              Sensation: Intact               Coordination:     Vision:      Functional Mobility:  Bed Mobility:  Received in bedside chair. Discussed log roll for bed mobility.     Transfers:  Sit to Stand: Modified independent  Stand to Sit: Modified independent                       Balance:   Sitting: Intact  Standing: Impaired  Standing - Static: Good  Standing - Dynamic : Good()  Ambulation/Gait Training:  Distance (ft): 300 Feet (ft)(with 2 short standing rest breaks)  Assistive Device: (intermittent use of IV pole for balance)  Ambulation - Level of Assistance: Stand-by assistance     Gait Description (WDL): Exceptions to WDL  Gait Abnormalities: Decreased step clearance        Base of Support: Widened     Speed/Mikayla: Fluctuations                           Physical Therapy Evaluation Charge Determination   History Examination Presentation Decision-Making   MEDIUM  Complexity : 1-2 comorbidities / personal factors will impact the outcome/ POC  LOW Complexity : 1-2 Standardized tests and measures addressing body structure, function, activity limitation and / or participation in recreation  LOW Complexity : Stable, uncomplicated  LOW Complexity : FOTO score of       Based on the above components, the patient evaluation is determined to be of the following complexity level: LOW     Pain Rating:  3-4/10 in surgical site    Activity Tolerance:   Good  Please refer to the flowsheet for vital signs taken during this treatment. After treatment patient left in no apparent distress:   Sitting in chair and Call bell within reach    COMMUNICATION/EDUCATION:   The patients plan of care was discussed with: Registered nurse. Fall prevention education was provided and the patient/caregiver indicated understanding., Patient/family have participated as able in goal setting and plan of care. and Patient/family agree to work toward stated goals and plan of care.     Thank you for this referral.  Chano Valenzuela, PT, DPT   Time Calculation: 26 mins

## 2020-05-08 NOTE — ROUTINE PROCESS
Bedside shift change report given to Mana Jimenez (oncoming nurse) by Natacha Anne RN (offgoing nurse). Report included the following information SBAR, Kardex, MAR and Recent Results.

## 2020-05-08 NOTE — OP NOTES
[facility]  OPERATIVE REPORT    Name:  Marge Moctezuma  MR#:  [id number]  :  1970  ACCOUNT #:  [de-identified]    DATE OF SERVICE:  2020    PREOPERATIVE DIAGNOSIS:  Rectal cancer. POSTOPERATIVE DIAGNOSIS:  Rectal cancer. PROCEDURE PERFORMED:  Hand-assisted laparoscopic low anterior resection, mobilization of the splenic flexure, and coloproctostomy. SURGEON:  MD Jojo Lindsay. [place], [place]    ANESTHESIA:  General endotracheal.    SPECIMENS REMOVED:  1. Sigmoid colon and proximal rectum. 2.  Anastomotic donuts. TUBES AND DRAINS:  10-mm Scott-Morgan drain in the pelvis. ESTIMATED BLOOD LOSS:  100 mL. IV FLUIDS:  Crystalloid 1200 mL. ALBUMIN:  250 mL. URINE OUTPUT:  130 mL. COMPLICATIONS:  None apparent. IMPLANTS:  None. INDICATIONS FOR PROCEDURE:  The patient is a 60-year-old female who has been experiencing hematochezia for approximately 1-1/2 years. Eventually, she saw Festus Latham MD, who performed a colonoscopy on her on 2020. That procedure revealed diverticulosis from the mid ascending colon to the mid sigmoid colon, a 3-mm distal rectal polyp that was completely excised, and a 4-cm polyp of the proximal rectum that appeared to have been completely excised as well. Because of the size of the polyp, the polypectomy site was tattooed with [place] ink. 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 A CEA level was obtained on 2020 and found to be within normal limits (2.2 ng/mL). A flexible sigmoidoscopy with rectal endoscopic ultrasound and some additional biopsies was performed by Dr. Antonietta Salter on 2020, and it revealed the polypectomy site and a small residual portion of the proximal rectal neoplasm.   Biopsies of the proximal lesion were consistent with tubular adenoma and those of the polypectomy site itself were benign with a prominent mucosal lymphoid aggregate. The endoscopic ultrasound findings were consistent with normal muscularis propria (uT1) and no apparent lymphadenopathy (uN0). A CT scan of the abdomen and pelvis with oral and IV contrast was performed on 04/07/2020, and it revealed uterine fibroids and no evidence of metastatic disease. Surgery had been planned and had originally been scheduled for 04/14/2020. Because of the COVID-19 pandemic, it was rescheduled 4 times. Finally, with the ability to test preoperative patients for the virus and with the patient having in fact tested negative, it was possible to proceed. The risks of surgery had been discussed in detail, including those associated with the pandemic, and the patient agreed to proceed. OPERATIVE FINDINGS:  There was a small residual proximal rectal neoplasm. There was no evidence of metastatic disease. The uterus was massive and contained multiple fibroids. The ovaries were normal.    DESCRIPTION OF PROCEDURE:  After informed consent was obtained, the patient was taken to the operating room where standard monitoring devices were attached and adequate general endotracheal anesthesia was induced. She was then positioned in lithotomy with the lower extremities fitted with pneumatic compression stockings and supported by the padded hydraulic David stirrups. The perineum was prepped and draped in the usual sterile fashion and Kris Dickerson MD performed cystoscopy and placement of bilateral ureteral catheters and a Smith catheter. The urologic procedure had been performed in order to facilitate intraoperative identification of the ureters, and once it was completed, digital rectal examination was performed, followed by the insertion into the rectum of a 3-way Smith catheter with a 30 mL balloon.   This catheter was then used to copiously irrigate the rectum and distal colon with several hundred milliliters of sterile water until the effluent ran clear. A Betadine solution was then instilled into the rectum and also allowed to drain out through the catheter, which was left in place until much later in the case when its removal was required. The patient was repositioned into a low lithotomy with a gel pad beneath the sacrum, the right upper extremity tucked and the left upper extremity extended on an arm board. An orogastric tube was inserted by the anesthetist.  2 g of cefotetan were administered parenterally. The abdomen and perineum were prepped and draped in the usual sterile fashion. The patient had also undergone mechanical and antibiotic bowel preparation on the day before surgery. The abdomen was entered through a periumbilical midline incision that was extended through the subcutaneous adipose tissues and the linea alba using the electrocautery. The GelPort was put in place and it would be used as both wound protector and wound retractor. Pneumoperitoneum was achieved using a 12-mm trocar inserted through the [address]. The 5-mm, 30-degree laparoscope was then introduced and used to explore the abdominal cavity and pelvis visually. Next, under direct vision, a 5-mm trocar was inserted in the suprapubic midline. The laparoscope was then moved to the suprapubic site. The nondominant hand was inserted through the GelPort and the 12-mm trocar was inserted through a right lower quadrant incision. The abdomen was re-explored by palpation and visualization. The articulating Enseal was deployed through the right lower quadrant port. The residual mass was not palpable but the tattooing was visible. The mass of the uterus certainly made the operation more difficult. The mesorectum at the sacral promontory was opened using the Enseal and then dissection was carried out toward the inferior mesenteric vessels.   These vessels were partially skeletonized and then divided and ligated using a firing of the [place] Flex stapling device with a 60-mm white cartridge. Hemostasis was acceptable. The mobilization of the rectum and sigmoid colon was then continued using the Enseal.  Great care was taken to repeatedly identify and avoid both ureters. The left ureter was particularly close to the area of disease. The mesorectal dissection was carried out in the relatively avascular presacral plane. The dissection was extended down to below the tattoo, and here the mesorectum was gradually divided using the Enseal.  Once the rectum was sufficiently cleared, it was divided using two firings of the [place] flex stapling device with a 45-mm blue cartridge. The mobilization of the colon then continued, including complete mobilization of the splenic flexure. This portion of the procedure was somewhat hazardous as there was less than 1 mm of separation between the colon and the spleen in one area. This particular area was incised using laparoscopic valerie in order to prevent thermal injury to the colon. The omentum was  from the distal transverse colon and the proximal descending portion. The colon was gradually reflected medially and inferiorly. Careful inspection of the spleen revealed no injuries. The duodenum had of course also been carefully avoided. With the colon sufficiently mobilized, it was possible to extracorporealize it through the [address]. Inspection revealed good blood supply to the portion to be kept. A point was chosen for the division of the colon that would provide an acceptable gross margin and also yield the required lymph nodes with the specimen. The intervening mesentery was divided using a combination of sharp division between clamps and also use of the Enseal.  The sharply divided structures were ligated with 2-0 silk ties and 2-0 silk sutures.   The colon was opened using the electrocautery distal to the point prepared for transection. The retained contents were evacuated with suction, and then the EEA sizers were lubricated and brought onto the field. The #25 and then the #28 could each be inserted into the lumen. The #28 fit a bit snugly, but the #29 Ethicon ACS stapling device was selected. The anvil was brought onto the field, lubricated with Betadine, and inserted into the colonic lumen. It was advanced retrograde, proximal to the point chosen for division, then the [place] flex stapling device with the 60-mm blue cartridge was used to divide the bowel. This division also completed the resection of the specimen, which was taken to the back table and opened revealing the residual neoplasm and grossly negative margins. Returning to the abdomen after changing gown and gloves, the stapled end of the colon was inspected. The anvil shaft was brought out through it where an opening was created using the electrocautery adjacent to the staple line. A 2-0 Prolene pursestring suture was placed to secure the anvil shaft, then some additional preparation of this end of the colon was performed. The inside of the anvil shaft was irrigated with Betadine solution and the bowel was returned to the abdominal cavity. Pneumoperitoneum was restored and the assistant went to the perineum and inserted the lubricated rigid proctosigmoidoscope into the rectum. Through the scope, air was insufflated in order to distend the rectum and verify that the staple line was intact. There was no bubbling of air and the rectum remained distended. It was therefore deflated and we proceeded with the construction of the coloproctostomy. The assistant inserted the lubricated #25 and then the #28 sizers. Each could be easily advanced to the rectal staple line. The #29 ACS stapling device was lubricated and inserted and advanced into the rectal stump.   The spike was brought out through the approximate midportion near the staple line and then the anvil shaft and spike were engaged. The stapler was slowly closed with care taken to avoid the incorporation of any extraneous tissues or twisting of the bowel. Once the stapler was closed, it was fired, opened and removed. Inspection of the anastomotic donuts revealed both to be intact. Leak testing was then performed using the rigid proctosigmoidoscope to inflate the bowel, which had been gently occluded a few centimeters proximal to the anastomosis. The bowel held the air and there was no bubbling seen in irrigant instilled into the pelvis. Leak testing was repeated again with the same result. The anastomosis was therefore judged to be airtight. The bowel was deflated and the scope was removed. The abdominal cavity was carefully inspected and hemostasis was found to be good. The left upper quadrant and the pelvis were both irrigated and the irrigant was mostly evacuated. The small intestine was run twice and there was no evidence of any injury. A flat 10-mm Scott-Morgan drain was placed in the pelvis near the anastomosis and brought out through a left lower quadrant stab incision and secured to the skin with a 3-0 nylon suture. The trocars were removed and four small sheets of Seprafilm were placed. Two of these were placed directly on the viscera and two were placed on the omentum that were then draped over the viscera. No Seprafilm was placed directly on the coloproctostomy. The GelPort was removed. Per protocol, gowns and gloves were changed and the dedicated closing instrument set was opened. The patient was re-draped and the periumbilical midline fascial closure was performed using two running #1 PDS sutures. The subcutaneous portions of all three wounds were irrigated with saline. The subcutaneous adipose tissue in the periumbilical wound was reapproximated using Vicryl sutures. Skin closure at all three wounds was performed using subcuticular 4-0 Monocryl sutures and Dermabond.   A dressing was placed at the drain site. The ureteral catheters were removed. The Smith catheter was left in place. The orogastric tube was removed. There were no apparent complications, and the patient appeared to have tolerated the procedure well. At its conclusion, she was extubated and transported in stable condition to the recovery room. All sponge, needle, and instrument counts were correct.         Laurie Vasques MD      [id number]  D:  05/08/2020 15:36  T:  05/08/2020 16:36  JOB #:  [id number]  CC:  MD Stewart Hayes MD Deland Poag, MD

## 2020-05-09 LAB
ANION GAP SERPL CALC-SCNC: 7 MMOL/L (ref 5–15)
BASOPHILS # BLD: 0 K/UL (ref 0–0.1)
BASOPHILS NFR BLD: 0 % (ref 0–1)
BUN SERPL-MCNC: 16 MG/DL (ref 6–20)
BUN/CREAT SERPL: 8 (ref 12–20)
CALCIUM SERPL-MCNC: 7.8 MG/DL (ref 8.5–10.1)
CHLORIDE SERPL-SCNC: 107 MMOL/L (ref 97–108)
CO2 SERPL-SCNC: 23 MMOL/L (ref 21–32)
CREAT SERPL-MCNC: 1.89 MG/DL (ref 0.55–1.02)
DIFFERENTIAL METHOD BLD: ABNORMAL
EOSINOPHIL # BLD: 0 K/UL (ref 0–0.4)
EOSINOPHIL NFR BLD: 0 % (ref 0–7)
ERYTHROCYTE [DISTWIDTH] IN BLOOD BY AUTOMATED COUNT: 12.2 % (ref 11.5–14.5)
GLUCOSE SERPL-MCNC: 102 MG/DL (ref 65–100)
HCT VFR BLD AUTO: 33.1 % (ref 35–47)
HGB BLD-MCNC: 10.5 G/DL (ref 11.5–16)
IMM GRANULOCYTES # BLD AUTO: 0 K/UL (ref 0–0.04)
IMM GRANULOCYTES NFR BLD AUTO: 0 % (ref 0–0.5)
LYMPHOCYTES # BLD: 2 K/UL (ref 0.8–3.5)
LYMPHOCYTES NFR BLD: 19 % (ref 12–49)
MAGNESIUM SERPL-MCNC: 2.3 MG/DL (ref 1.6–2.4)
MCH RBC QN AUTO: 29.9 PG (ref 26–34)
MCHC RBC AUTO-ENTMCNC: 31.7 G/DL (ref 30–36.5)
MCV RBC AUTO: 94.3 FL (ref 80–99)
MONOCYTES # BLD: 0.5 K/UL (ref 0–1)
MONOCYTES NFR BLD: 5 % (ref 5–13)
NEUTS SEG # BLD: 7.7 K/UL (ref 1.8–8)
NEUTS SEG NFR BLD: 76 % (ref 32–75)
NRBC # BLD: 0 K/UL (ref 0–0.01)
NRBC BLD-RTO: 0 PER 100 WBC
PHOSPHATE SERPL-MCNC: 3.1 MG/DL (ref 2.6–4.7)
PLATELET # BLD AUTO: 218 K/UL (ref 150–400)
PMV BLD AUTO: 10.3 FL (ref 8.9–12.9)
POTASSIUM SERPL-SCNC: 4 MMOL/L (ref 3.5–5.1)
RBC # BLD AUTO: 3.51 M/UL (ref 3.8–5.2)
SODIUM SERPL-SCNC: 137 MMOL/L (ref 136–145)
WBC # BLD AUTO: 10.2 K/UL (ref 3.6–11)

## 2020-05-09 PROCEDURE — 84100 ASSAY OF PHOSPHORUS: CPT

## 2020-05-09 PROCEDURE — 83735 ASSAY OF MAGNESIUM: CPT

## 2020-05-09 PROCEDURE — 74011250636 HC RX REV CODE- 250/636: Performed by: COLON & RECTAL SURGERY

## 2020-05-09 PROCEDURE — 80048 BASIC METABOLIC PNL TOTAL CA: CPT

## 2020-05-09 PROCEDURE — 74011250637 HC RX REV CODE- 250/637: Performed by: COLON & RECTAL SURGERY

## 2020-05-09 PROCEDURE — 36415 COLL VENOUS BLD VENIPUNCTURE: CPT

## 2020-05-09 PROCEDURE — 85025 COMPLETE CBC W/AUTO DIFF WBC: CPT

## 2020-05-09 PROCEDURE — 65270000029 HC RM PRIVATE

## 2020-05-09 RX ORDER — ENOXAPARIN SODIUM 100 MG/ML
40 INJECTION SUBCUTANEOUS EVERY 24 HOURS
Status: DISCONTINUED | OUTPATIENT
Start: 2020-05-10 | End: 2020-05-10

## 2020-05-09 RX ORDER — CLONIDINE HYDROCHLORIDE 0.1 MG/1
0.1 TABLET ORAL
Status: DISCONTINUED | OUTPATIENT
Start: 2020-05-09 | End: 2020-05-10 | Stop reason: HOSPADM

## 2020-05-09 RX ADMIN — ACETAMINOPHEN 1000 MG: 500 TABLET, FILM COATED ORAL at 00:05

## 2020-05-09 RX ADMIN — SODIUM CHLORIDE 1000 ML: 900 INJECTION, SOLUTION INTRAVENOUS at 07:03

## 2020-05-09 RX ADMIN — ALVIMOPAN 12 MG: 12 CAPSULE ORAL at 08:41

## 2020-05-09 RX ADMIN — ALVIMOPAN 12 MG: 12 CAPSULE ORAL at 17:43

## 2020-05-09 RX ADMIN — ACETAMINOPHEN 1000 MG: 500 TABLET, FILM COATED ORAL at 12:27

## 2020-05-09 RX ADMIN — HYDROCHLOROTHIAZIDE 12.5 MG: 25 TABLET ORAL at 08:41

## 2020-05-09 RX ADMIN — ACETAMINOPHEN 500 MG: 500 TABLET, FILM COATED ORAL at 05:29

## 2020-05-09 RX ADMIN — NORETHINDRONE ACETATE AND ETHINYL ESTRADIOL, AND FERROUS FUMARATE 1 TABLET: KIT at 20:36

## 2020-05-09 RX ADMIN — ACETAMINOPHEN 1000 MG: 500 TABLET, FILM COATED ORAL at 23:50

## 2020-05-09 NOTE — PROGRESS NOTES
General Daily Progress Note    Admission Date: 5/7/2020  Hospital Day 3  Post-Op Day 2  Subjective:   Pain control adequate. No nausea, but appetite is not very robust.  Voiding well. Has passed flatus, but no bowel movement. No dyspnea. Objective:     Patient Vitals for the past 24 hrs:   BP Temp Pulse Resp SpO2 Weight   05/09/20 0839 (!) 150/93 98.6 °F (37 °C) 92 18 98 %    05/09/20 0707      91.4 kg (201 lb 6.4 oz)   05/09/20 0538 139/88 98.8 °F (37.1 °C) 93 16 95 %    05/09/20 0000 145/80 98.9 °F (37.2 °C) (!) 102 15 94 %    05/08/20 2043 124/83 98.4 °F (36.9 °C) 93 16 95 %    05/08/20 1718 119/81  99      05/08/20 1628 143/90 98.3 °F (36.8 °C) (!) 104 16 98 %      05/09 0701 - 05/09 1900  In: -   Out: 520 [Urine:500; Drains:20]  05/07 1901 - 05/09 0700  In: 1079.7 [I.V.:1079.7]  Out: 0901 [Urine:3175; Drains:135]      Physical Examination:  General Appearance - No distress. Abdomen - Non-distended. Appropriately tender. Incisions clean, dry, intact, and without erythema. KELLY drainage serosanguinous. Extremities - Elastic and pneumatic compression stockings in use. Data Review   Recent Results (from the past 24 hour(s))   CBC WITH AUTOMATED DIFF    Collection Time: 05/09/20  5:05 AM   Result Value Ref Range    WBC 10.2 3.6 - 11.0 K/uL    RBC 3.51 (L) 3.80 - 5.20 M/uL    HGB 10.5 (L) 11.5 - 16.0 g/dL    HCT 33.1 (L) 35.0 - 47.0 %    MCV 94.3 80.0 - 99.0 FL    MCH 29.9 26.0 - 34.0 PG    MCHC 31.7 30.0 - 36.5 g/dL    RDW 12.2 11.5 - 14.5 %    PLATELET 200 036 - 955 K/uL    MPV 10.3 8.9 - 12.9 FL    NRBC 0.0 0  WBC    ABSOLUTE NRBC 0.00 0.00 - 0.01 K/uL    NEUTROPHILS 76 (H) 32 - 75 %    LYMPHOCYTES 19 12 - 49 %    MONOCYTES 5 5 - 13 %    EOSINOPHILS 0 0 - 7 %    BASOPHILS 0 0 - 1 %    IMMATURE GRANULOCYTES 0 0.0 - 0.5 %    ABS. NEUTROPHILS 7.7 1.8 - 8.0 K/UL    ABS. LYMPHOCYTES 2.0 0.8 - 3.5 K/UL    ABS. MONOCYTES 0.5 0.0 - 1.0 K/UL    ABS.  EOSINOPHILS 0.0 0.0 - 0.4 K/UL ABS. BASOPHILS 0.0 0.0 - 0.1 K/UL    ABS. IMM. GRANS. 0.0 0.00 - 0.04 K/UL    DF AUTOMATED     METABOLIC PANEL, BASIC    Collection Time: 05/09/20  5:05 AM   Result Value Ref Range    Sodium 137 136 - 145 mmol/L    Potassium 4.0 3.5 - 5.1 mmol/L    Chloride 107 97 - 108 mmol/L    CO2 23 21 - 32 mmol/L    Anion gap 7 5 - 15 mmol/L    Glucose 102 (H) 65 - 100 mg/dL    BUN 16 6 - 20 MG/DL    Creatinine 1.89 (H) 0.55 - 1.02 MG/DL    BUN/Creatinine ratio 8 (L) 12 - 20      GFR est AA 34 (L) >60 ml/min/1.73m2    GFR est non-AA 28 (L) >60 ml/min/1.73m2    Calcium 7.8 (L) 8.5 - 10.1 MG/DL   MAGNESIUM    Collection Time: 05/09/20  5:05 AM   Result Value Ref Range    Magnesium 2.3 1.6 - 2.4 mg/dL   PHOSPHORUS    Collection Time: 05/09/20  5:05 AM   Result Value Ref Range    Phosphorus 3.1 2.6 - 4.7 MG/DL           Assessment:     Principal Problem:    Rectal cancer (HCC) (4/8/2020)        2 Days Post-Op s/p hand-assisted laparoscopic low anterior resection, mobilization of the splenic flexure, and coloproctostomy.     Hemodynamically stable. Hemoglobin decreased a bit more than expected but still adequate; probably from equilibration but there could also be some \"oozing. \"  Lovenox has been held for today.     Urine output adequate. Creatine has increased more, consistent with acute kidney injury. Toradol ended, and Celebrex has been discontinued. NS fluid bolus was given this morning.     Leukocytosis has resolved.     Hypophosphatemia corrected.       Plan:   Continue low fiber diet with caution. Continue to hold Celebrex. Consider restarting Lovenox tomorrow. Ambulate. Physical therapy. Incentive spirometer.

## 2020-05-09 NOTE — ROUTINE PROCESS
Bedside shift change report given to [address] [facility] [address] (oncoming nurse) by Charis Arceo RN (offgoing nurse). Report included the following information SBAR and Kardex.

## 2020-05-09 NOTE — PROGRESS NOTES
Bedside and Verbal shift change report given to Jeremy Vazquez RN (oncoming nurse) by Essie Hills RN (offgoing nurse). Report included the following information SBAR, Kardex, OR Summary, Procedure Summary, Intake/Output, MAR and Recent Results.

## 2020-05-09 NOTE — ROUTINE PROCESS
Bedside and Verbal shift change report given to KIRSTEN Larson (oncoming nurse) by Radha Khan (offgoing nurse). Report included the following information SBAR, Kardex and Recent Results.

## 2020-05-10 VITALS
OXYGEN SATURATION: 98 % | DIASTOLIC BLOOD PRESSURE: 96 MMHG | SYSTOLIC BLOOD PRESSURE: 145 MMHG | BODY MASS INDEX: 35.14 KG/M2 | HEART RATE: 95 BPM | TEMPERATURE: 98.1 F | HEIGHT: 63 IN | RESPIRATION RATE: 18 BRPM | WEIGHT: 198.3 LBS

## 2020-05-10 LAB
ANION GAP SERPL CALC-SCNC: 5 MMOL/L (ref 5–15)
BASOPHILS # BLD: 0 K/UL (ref 0–0.1)
BASOPHILS NFR BLD: 0 % (ref 0–1)
BUN SERPL-MCNC: 13 MG/DL (ref 6–20)
BUN/CREAT SERPL: 7 (ref 12–20)
CALCIUM SERPL-MCNC: 8.6 MG/DL (ref 8.5–10.1)
CHLORIDE SERPL-SCNC: 106 MMOL/L (ref 97–108)
CO2 SERPL-SCNC: 24 MMOL/L (ref 21–32)
CREAT SERPL-MCNC: 1.76 MG/DL (ref 0.55–1.02)
DIFFERENTIAL METHOD BLD: ABNORMAL
EOSINOPHIL # BLD: 0.1 K/UL (ref 0–0.4)
EOSINOPHIL NFR BLD: 1 % (ref 0–7)
ERYTHROCYTE [DISTWIDTH] IN BLOOD BY AUTOMATED COUNT: 11.9 % (ref 11.5–14.5)
GLUCOSE SERPL-MCNC: 98 MG/DL (ref 65–100)
HCT VFR BLD AUTO: 34.6 % (ref 35–47)
HGB BLD-MCNC: 11.5 G/DL (ref 11.5–16)
IMM GRANULOCYTES # BLD AUTO: 0 K/UL (ref 0–0.04)
IMM GRANULOCYTES NFR BLD AUTO: 0 % (ref 0–0.5)
LYMPHOCYTES # BLD: 2.6 K/UL (ref 0.8–3.5)
LYMPHOCYTES NFR BLD: 24 % (ref 12–49)
MCH RBC QN AUTO: 31 PG (ref 26–34)
MCHC RBC AUTO-ENTMCNC: 33.2 G/DL (ref 30–36.5)
MCV RBC AUTO: 93.3 FL (ref 80–99)
MONOCYTES # BLD: 0.6 K/UL (ref 0–1)
MONOCYTES NFR BLD: 5 % (ref 5–13)
NEUTS SEG # BLD: 7.6 K/UL (ref 1.8–8)
NEUTS SEG NFR BLD: 70 % (ref 32–75)
NRBC # BLD: 0 K/UL (ref 0–0.01)
NRBC BLD-RTO: 0 PER 100 WBC
PLATELET # BLD AUTO: 273 K/UL (ref 150–400)
PMV BLD AUTO: 9.5 FL (ref 8.9–12.9)
POTASSIUM SERPL-SCNC: 4 MMOL/L (ref 3.5–5.1)
RBC # BLD AUTO: 3.71 M/UL (ref 3.8–5.2)
SODIUM SERPL-SCNC: 135 MMOL/L (ref 136–145)
WBC # BLD AUTO: 10.9 K/UL (ref 3.6–11)

## 2020-05-10 PROCEDURE — 85025 COMPLETE CBC W/AUTO DIFF WBC: CPT

## 2020-05-10 PROCEDURE — 74011250637 HC RX REV CODE- 250/637: Performed by: COLON & RECTAL SURGERY

## 2020-05-10 PROCEDURE — 36415 COLL VENOUS BLD VENIPUNCTURE: CPT

## 2020-05-10 PROCEDURE — 80048 BASIC METABOLIC PNL TOTAL CA: CPT

## 2020-05-10 RX ORDER — HYDROMORPHONE HYDROCHLORIDE 2 MG/1
2 TABLET ORAL
Qty: 10 TAB | Refills: 0 | Status: SHIPPED | OUTPATIENT
Start: 2020-05-10 | End: 2020-05-15

## 2020-05-10 RX ORDER — ENOXAPARIN SODIUM 100 MG/ML
40 INJECTION SUBCUTANEOUS EVERY 24 HOURS
Status: DISCONTINUED | OUTPATIENT
Start: 2020-05-11 | End: 2020-05-10

## 2020-05-10 RX ADMIN — ALVIMOPAN 12 MG: 12 CAPSULE ORAL at 09:00

## 2020-05-10 RX ADMIN — HYDROCHLOROTHIAZIDE 12.5 MG: 25 TABLET ORAL at 08:59

## 2020-05-10 RX ADMIN — ACETAMINOPHEN 1000 MG: 500 TABLET, FILM COATED ORAL at 06:01

## 2020-05-10 NOTE — PROGRESS NOTES
Bedside shift change report given to Rachel Mccormick RN (oncoming nurse) by Gio Solano and Lavelle Clark RN (offgoing nurse). Report included the following information SBAR, Kardex, Intake/Output, MAR and Recent Results.

## 2020-05-10 NOTE — DISCHARGE INSTRUCTIONS
Discharge Instructions      1. Do not lift any objects weighing more than 10 pounds for 4 weeks after the surgery date. 2. Do not do any housework including vacuuming, scrubbing or yardwork for 4 weeks after the surgery date. 3. Do not drive for 2 weeks after the surgery date or while taking sedating medications. 4. You should walk frequently and you should go up and down stairs as desired. 5. You may shower, but do not submerge your abdomen. Do not take tub baths, swim, or use hot tubs for the next 4 weeks. Keep the drain site wound covered with a clean dry pad, changing it daily and as needed until the wound has been dry and closed for at least 24 hours. 6. You have surgical glue on your incisions. It will dissolve over time. Do not scrub around the incisions or attempt to remove the glue. 7. Continue the low fiber diet for the next 2 weeks. (See handbook for additional details). 8. Drink nutritional supplements 2 times per day until your diet and appetite are back to normal.     9. It can be normal to have multiple bowel movements each day. Contact your surgeons office for any concerns. 10. Take maximum doses of Tylenol (1000 mg every 6 hours) until you do not need pain medication any more. You may take other pain medication (hydromorphone) as prescribed in addition to the Tylenol (but not in place of it) if you have already had as much Tylenol as you are allowed to have and your pain is not controlled well enough. 11. Follow up:  Please return to Dr. Evy Soulier office at 12:30 PM on Monday 5/18/2020. If you need to change the appointment, please call [phone] on a weekday between 9:00 AM and noon. 12. If you experience fever (temperature greater than 100.5), chills, vomiting, redness around an incision, or infected-looking drainage from an incision, please contact your surgeons office. 13. Please see handbook for additional instructions.  If you have further questions, please call your surgeons office.

## 2020-05-10 NOTE — DISCHARGE SUMMARY
Discharge Summary     Patient ID:    Angi Marroquin  [id number]  47 y.o.  1970    Admission Date: 5/7/2020    Discharge Date: 5/10/2020    Admission Diagnoses:  1. Rectal cancer  2. Hypertension  3. Obesity  4. Uterine fibroids      Chronic Diagnoses:    Patient Active Problem List   Diagnosis Code    Hyperlipidemia E78.5    Anxiety F41.9    Insomnia G47.00    Intractable episodic headache R51    Episode of recurrent major depressive disorder ([facility] F33.9    Obesity, Class II, BMI 35-39.9 E66.9    Essential hypertension I10    Severe obesity ([facility] E66.01    Rectal cancer ([facility] C20       Discharge Diagnoses:  1. Moderately differentiated [id number] (stage I) adenocarcinoma of the proximal rectum (resected)  2. Acute kidney injury (treated)  3. Anemia secondary to acute blood loss  4. Hypertension  5. Obesity  6. Uterine fibroids  7. Hypophosphatemia (corrected)      Hospital Problems as of 5/10/2020 Date Reviewed: 1/13/2020          Codes Class Noted - Resolved POA    * (Principal) Rectal cancer ([facility] (Chronic) ICD-10-CM: C20  ICD-9-CM: 154.1  4/8/2020 - Present Yes              Procedures Performed:  1. Cystourethroscopy and placement of temporary bilateral ureteral catheters was performed by Handy Delgado MD on 5/7/2020.  2.  Hand-assisted laparoscopic low anterior resection, mobilization of the splenic flexure, and coloproctostomy was performed by Dr. Nkechi Schaffer on 5/7/2020. Discharge Medications:   Current Discharge Medication List      START taking these medications    Details   HYDROmorphone (DILAUDID) 2 mg tablet Take 1 Tab by mouth every six (6) hours as needed (Acute post-operative pain) for up to 5 days. Max Daily Amount: 8 mg.   Qty: 10 Tab, Refills: 0    Associated Diagnoses: Acute post-operative pain         CONTINUE these medications which have NOT CHANGED    Details   hydroCHLOROthiazide (HYDRODIURIL) 12.5 mg tablet TAKE 1 TABLET DAILY  Qty: 90 Tab, Refills: 1 Associated Diagnoses: Essential hypertension      atorvastatin (LIPITOR) 40 mg tablet TAKE 1 TABLET DAILY (NEED  OFFICE VISIT FOR FUTURE    MEDICATION REFILL)  Qty: 90 Tab, Refills: 1    Associated Diagnoses: Mixed hyperlipidemia      TAYTULLA 1 mg-20 mcg (24)/75 mg (4) cap nightly. doxylamine succinate (Sleep Aid, Doxylamine,) 25 mg tablet Take 25 mg by mouth nightly. TAKES UP TO THREE TABS NIGHTLY. STOP taking these medications       naltrexone-buPROPion (CONTRAVE) 8-90 mg TbER ER tablet Comments:   Reason for Stopping: Not currently needed        naproxen (NAPROSYN) 500 mg tablet Comments:   Reason for Stopping:  Nephrotoxicity and bleeding risk               Diet:  Low fiber diet for two weeks. Activity:  No driving for two weeks after the surgery date. No lifting more than 10 lb. for for weeks after the surgery date. Stair climbing is permitted and frequent ambulation is encouraged. Wound Care:  Dry dressing the the drain site wound changed daily and as needed. Discharge Condition:  Improved compared to that upon admission. Disposition:  Home. Follow-up Care:  1. Dr. Nahomy Archibald at 12:30 PM on Monday 5/18/2020  2. Oncology consultation to be arranged if indicated. Significant Diagnostic Studies:   Recent Labs     05/10/20  0440 05/09/20  0505   WBC 10.9 10.2   HGB 11.5 10.5*   HCT 34.6* 33.1*    218     Recent Labs     05/10/20  0440 05/09/20  0505 05/08/20  0526 05/07/20  1735   * 137 135* 138   K 4.0 4.0 3.9 4.5    107 108 106   CO2 24 23 21 26   BUN 13 16 15 12   CREA 1.76* 1.89* 1.53* 1.56*   GLU 98 102* 127* 145*   CA 8.6 7.8* 8.4* 8.1*   MG  --  2.3 2.6* 2.8*   PHOS  --  3.1 1.6* 3.2       HOSPITAL COURSE & TREATMENT RENDERED:     The patient is a 54-year-old female who had been experiencing hematochezia for approximately 1-1/2 years. Eventually, she saw Elliot Frazier MD, who performed a colonoscopy on her on 03/25/2020.   That procedure revealed diverticulosis from the mid ascending colon to the mid sigmoid colon, a 3-mm distal rectal polyp that was completely excised, and a 4-cm polyp of the proximal rectum that appeared to have been completely excised as well. Because of the size of the polyp, the polypectomy site was tattooed with [place] ink. 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 A CEA level was obtained on 03/31/2020 and found to be within normal limits (2.2 ng/mL). A flexible sigmoidoscopy with rectal endoscopic ultrasound and some additional biopsies was performed by Dr. Mike Pickens on 04/01/2020, and it revealed the polypectomy site and a small residual portion of the proximal rectal neoplasm. Biopsies of the proximal lesion were consistent with tubular adenoma and those of the polypectomy site itself were benign with a prominent mucosal lymphoid aggregate. The endoscopic ultrasound findings were consistent with normal muscularis propria (uT1) and no apparent lymphadenopathy (uN0). A CT scan of the abdomen and pelvis with oral and IV contrast was performed on 04/07/2020, and it revealed uterine fibroids and no evidence of metastatic disease. Surgery had been planned and had originally been scheduled for 04/14/2020. Because of the COVID-19 pandemic, it was rescheduled four times. Finally, with the ability to test preoperative patients for the virus and with the patient having in fact tested negative, it was possible to proceed. The risks of surgery had been discussed in detail, including those associated with the pandemic, and the patient agreed to proceed. She was taken to the operating room on 5/7/2020, and there the above-listed procedures were performed without apparent complications.   Post-operatively, she was transferred fromt  recovery room to the floor in stable condition. Her post-operative course was essentially unremarkable except for a non-oliguric increase in her serum creatinine consistent with acute kidney injury (CANDELARIO). This CANDELARIO was treated via administration of additional parenteral fluids and the discontinuation of nephrotoxic medications. She experienced a gradual return of her gastrointestinal function, and her diet was advanced appropriately. On 5/10/2020 (POD#3), she was hemodynamically stable, afebrile, tolerating a low fiber diet, passing flatus, voiding well, and ambulating well, and her pain was well controlled with oral medication. The pathology results were still pending, but at this point she was judged to be fit for discharge to home. Her condition upon discharge was improved compared to that upon admission, and she appeared to have understood all discharge and follow-up instructions.         Signed:  Jace Kimbrough MD

## 2020-05-10 NOTE — PROGRESS NOTES
General Daily Progress Note    Admission Date: 5/7/2020  Hospital Day 4  Post-Op Day 3  Subjective: Tolerating diet. Appetite improving. Passing flatus. No bowel movement. Voiding well. Ambulating well. Pain mild. Objective:     Patient Vitals for the past 24 hrs:   BP Temp Pulse Resp SpO2 Weight   05/10/20 0845 (!) 145/96 98.1 °F (36.7 °C) 95 18 98 %    05/10/20 0415 120/86 98.5 °F (36.9 °C) 98 18 97 % 89.9 kg (198 lb 4.8 oz)   05/09/20 2331 125/61 99 °F (37.2 °C) (!) 107 18 97 %    05/09/20 1946 154/84 98 °F (36.7 °C) (!) 109 18 98 %    05/09/20 1755 (!) 123/93 98.6 °F (37 °C) (!) 108 18 96 %    05/09/20 1616 (!) 158/99 99.7 °F (37.6 °C) (!) 109 18 98 %      05/10 0701 - 05/10 1900  In: -   Out: 200 [Urine:200]  05/08 1901 - 05/10 0700  In: -   Out: 6380 [Urine:4100; Drains:55]      Physical Examination:  General Appearance - No distress. Abdomen - Non-distended.  Appropriately tender.  Incisions clean, dry, intact, and without erythema.  KELLY drainage serosanguinous. Extremities - No edema or calf tenderness. Data Review   Recent Results (from the past 24 hour(s))   CBC WITH AUTOMATED DIFF    Collection Time: 05/10/20  4:40 AM   Result Value Ref Range    WBC 10.9 3.6 - 11.0 K/uL    RBC 3.71 (L) 3.80 - 5.20 M/uL    HGB 11.5 11.5 - 16.0 g/dL    HCT 34.6 (L) 35.0 - 47.0 %    MCV 93.3 80.0 - 99.0 FL    MCH 31.0 26.0 - 34.0 PG    MCHC 33.2 30.0 - 36.5 g/dL    RDW 11.9 11.5 - 14.5 %    PLATELET 891 671 - 661 K/uL    MPV 9.5 8.9 - 12.9 FL    NRBC 0.0 0  WBC    ABSOLUTE NRBC 0.00 0.00 - 0.01 K/uL    NEUTROPHILS 70 32 - 75 %    LYMPHOCYTES 24 12 - 49 %    MONOCYTES 5 5 - 13 %    EOSINOPHILS 1 0 - 7 %    BASOPHILS 0 0 - 1 %    IMMATURE GRANULOCYTES 0 0.0 - 0.5 %    ABS. NEUTROPHILS 7.6 1.8 - 8.0 K/UL    ABS. LYMPHOCYTES 2.6 0.8 - 3.5 K/UL    ABS. MONOCYTES 0.6 0.0 - 1.0 K/UL    ABS. EOSINOPHILS 0.1 0.0 - 0.4 K/UL    ABS. BASOPHILS 0.0 0.0 - 0.1 K/UL    ABS. IMM.  GRANS. 0.0 0.00 - 0.04 K/UL    DF AUTOMATED     METABOLIC PANEL, BASIC    Collection Time: 05/10/20  4:40 AM   Result Value Ref Range    Sodium 135 (L) 136 - 145 mmol/L    Potassium 4.0 3.5 - 5.1 mmol/L    Chloride 106 97 - 108 mmol/L    CO2 24 21 - 32 mmol/L    Anion gap 5 5 - 15 mmol/L    Glucose 98 65 - 100 mg/dL    BUN 13 6 - 20 MG/DL    Creatinine 1.76 (H) 0.55 - 1.02 MG/DL    BUN/Creatinine ratio 7 (L) 12 - 20      GFR est AA 37 (L) >60 ml/min/1.73m2    GFR est non-AA 31 (L) >60 ml/min/1.73m2    Calcium 8.6 8.5 - 10.1 MG/DL           Assessment:     Principal Problem:    Rectal cancer (HCC) (4/8/2020)        3 Days Post-Op s/p hand-assisted laparoscopic low anterior resection, mobilization of the splenic flexure, and coloproctostomy.     Hemodynamically stable. Hemoglobin increased from yesterday.     Urine output adequate. Creatine appears to have peaked yesterday and is lower today, consistent with resolving acute kidney injury.     No leukocytosis or other sign of infection. Gastrointestinal function is returning. Pathology reports are pending. The patient appears to be fit for discharge. Plan:   Remove drain. Discharge to home. Follow up with me on 5/18/2020. Will arrange for oncology consultation if indicated.

## 2020-05-11 ENCOUNTER — PATIENT OUTREACH (OUTPATIENT)
Dept: FAMILY MEDICINE CLINIC | Age: 50
End: 2020-05-11

## 2020-05-11 RX ORDER — ACETAMINOPHEN 500 MG
TABLET ORAL
COMMUNITY
End: 2022-08-22 | Stop reason: ALTCHOICE

## 2020-05-11 NOTE — Clinical Note
[facility] admit 5-7-20 to 5-0-20 for rectal cancer. Transitions of care follow-up call.

## 2020-05-11 NOTE — PROGRESS NOTES
Hospital Discharge Follow-Up      Date/Time:  2020 12:54 PM  Toya Bosworth, BSN, RN  Care Transitions Nurse  Phone: [phone]    Patient was admitted to [facility] on 20 and discharged on 5-10-20 for rectal cancer. The physician discharge summary was not available at the time of outreach. Patient was contacted within one business day of discharge. Challenges reviewed with the provider:   - Patient states all incisions (approximately 4-5 total per patient) appear to be healing well. Patient states only one incision is currently requiring a gauze covering at this time. Patient reports no redness/swelling, no fever/chills since discharge. No red flags to report at this time. Patient states she is changing gauze dressing once a day. - Patient reports one fall at work on 20 and states she did not have any injury and did not seek medical care. - Patient reports she is not taking doxylamine succinate 25mg and this medication should be removed from medication list.  - Patient reports she is taking Tylenol PM to assist with sleep at night and this medication was added to her medication list today. - Patient reports she is taking Tylenol 500 mg, two tablets every six hours as needed for pain. This medication was added to her medication list today. Advance Care Planning:   Does patient have an Advance Directive:  not on file; education provided        Method of communication with provider : chart routing    Was this a readmission? no   Patient stated reason for the readmission: n/a    Care Transition Nurse (CTN) contacted the patient by telephone to perform post hospital discharge assessment. Verified name and  with patient as identifiers. Provided introduction to self, and explanation of the CTN role. Patient received hospital discharge instructions. CTN reviewed discharge instructions and red flags with patient who verbalized understanding.  Patient given an opportunity to ask questions and does not have any further questions or concerns at this time. The patient agrees to contact the PCP office for questions related to their healthcare. CTN provided contact information for future reference. Disease Specific:   N/A    Patients top risk factors for readmission:  medical condition    Home Health orders at discharge: [address]: n/a  Date of initial visit: [facility] ordered at discharge: none  [address] received: n/a    Medications per [facility] After Visit Summary dated 5-10-20:   New Medications at Discharge:  Start  HYDROmorphone 2 mg tablet  Commonly known as: DILAUDID  Take 1 Tab by mouth every six (6) hours as needed (Acute post-operative pain) for up to 5 days. Max Daily Amount: 8 mg.  2 mg    Changed Medications at Discharge: none    Discontinued Medications at Discharge:   Stop  naltrexone-buPROPion 8-90 mg Tber ER tablet  Commonly known as: Contrave  naproxen 500 mg tablet  Commonly known as: NAPROSYN    Medication reconciliation was performed with patient, who verbalizes understanding of administration of home medications. There were no barriers to obtaining medications identified at this time and patient reports her medications are affordable. Referral to Pharm D needed: no     Current Outpatient Medications   Medication Sig    acetaminophen/diphenhydramine (TYLENOL PM EXTRA STRENGTH PO) Take  by mouth.  acetaminophen (Tylenol Extra Strength) 500 mg tablet Take  by mouth every six (6) hours as needed for Pain.  HYDROmorphone (DILAUDID) 2 mg tablet Take 1 Tab by mouth every six (6) hours as needed (Acute post-operative pain) for up to 5 days. Max Daily Amount: 8 mg.     hydroCHLOROthiazide (HYDRODIURIL) 12.5 mg tablet TAKE 1 TABLET DAILY    atorvastatin (LIPITOR) 40 mg tablet TAKE 1 TABLET DAILY (NEED  OFFICE VISIT FOR FUTURE    MEDICATION REFILL) (Patient taking differently: nightly. TAKE 1 TABLET DAILY (NEED OFFICE VISIT FOR FUTURE MEDICATION REFILL))    TAYTULLA 1 mg-20 mcg (24)/75 mg (4) cap nightly.  doxylamine succinate (Sleep Aid, Doxylamine,) 25 mg tablet Take 25 mg by mouth nightly. TAKES UP TO THREE TABS NIGHTLY. No current facility-administered medications for this visit. There are no discontinued medications. [facility] follow up appointment(s):   Future Appointments   Date Time Provider Guy Fabian   7/13/2020  8:00 AM Jesus Carter  Josselyn Cassidy   Patient states she will call the office of Dr. Valenzuela [address] Deidre Swain/PCP today to make transitions of care follow-up appointment. Non-[facility] follow up appointment(s): Dr. Jaylon Lopez general surgery on 5-18-20. [facility]:  offered and patient declined     Goals      Attends follow-up appointments as directed. 5-11-20: Patient states she will call the office of Dr. Valenzuela [address] [place]/PCP to make transitions of care follow-up appointment today. Patient has general surgery follow-up appointment scheduled with Dr. Andrea Quick on 5-18-20. Patient's family is currently providing transportation to/from appointments. Nathaniel Anaya Understands red flags post discharge. 5-11-20: Red flags of post-op pain and inflammation reviewed with patient and patient verbalizes an understanding. Patient states she is using Tylenol 500mg,two tablets every 6 hours as needed for pain; however she does have Dilaudid on hand should she need it. Patient states she has not had any fever/chills, no redness/swelling at incision sites, and currently has no red flags to report. [address]           Education provided regarding infection prevention, and signs and symptoms of COVID-19 and when to seek medical attention with patient who verbalized understanding. Discussed exposure protocols and quarantine from [address] you at higher risk for severe illness 2019 and given an opportunity for questions and concerns.  The patient agrees to contact the COVID-19 hotline [phone] or PCP office for questions related to their healthcare. CTN/ACM provided contact information for future reference. From CDC: Are you at higher risk for severe illness?  Wash your hands often.  Avoid close contact (6 feet, which is about two arm lengths) with people who are sick.  Put distance between yourself and other people if COVID-19 is spreading in your community.  Clean and disinfect frequently touched surfaces.  Avoid all cruise travel and non-essential air travel.  Call your healthcare professional if you have concerns about COVID-19 and your underlying condition or if you are sick. For more information on steps you can take to protect yourself, see CDC's How to Protect Yourself      Patient/family/caregiver given information for GetWell Loop and agrees to enroll:  yes  Patient's preferred e-mail:  [email or web address]   Email contact preferred. Patient's preferred phone number: [phone]  Please do not contact patient before 5-12-20, per patient. Based on Loop alert triggers, patient will be contacted by nurse care manager for worsening symptoms. Plan for follow-up call in 7-14 days based on severity of symptoms and risk factors.

## 2020-05-21 ENCOUNTER — TELEPHONE (OUTPATIENT)
Dept: FAMILY MEDICINE CLINIC | Age: 50
End: 2020-05-21

## 2020-05-21 NOTE — TELEPHONE ENCOUNTER
Patient is calling stating she needs TYLER, she had surgery for section of her colon removed because that was cancerous, on May 7th in [facility]. She can do next week , but need a in person check up.   BCB# [phone]    Please advice

## 2020-05-22 NOTE — TELEPHONE ENCOUNTER
[phone] spoke to patient notified we are not seeing patient in the office due to Covid per patient she just wants to get her lab rechecked I advised we can do virtual visit and Dr. Lori Larson can order her labs patient understand.  She has appointment 5/27/2020 at 5:15Pm

## 2020-05-23 ENCOUNTER — HOSPITAL ENCOUNTER (INPATIENT)
Age: 50
LOS: 2 days | Discharge: HOME | DRG: 176 | End: 2020-05-25
Attending: EMERGENCY MEDICINE | Admitting: HOSPITALIST
Payer: COMMERCIAL

## 2020-05-23 ENCOUNTER — APPOINTMENT (OUTPATIENT)
Dept: CT IMAGING | Age: 50
DRG: 176 | End: 2020-05-23
Attending: EMERGENCY MEDICINE
Payer: COMMERCIAL

## 2020-05-23 DIAGNOSIS — I26.94 MULTIPLE SUBSEGMENTAL PULMONARY EMBOLI WITHOUT ACUTE COR PULMONALE (HCC): Primary | ICD-10-CM

## 2020-05-23 PROBLEM — I26.99 ACUTE PULMONARY EMBOLISM (HCC): Status: ACTIVE | Noted: 2020-05-23

## 2020-05-23 LAB
ALBUMIN SERPL-MCNC: 3.3 G/DL (ref 3.5–5)
ALBUMIN/GLOB SERPL: 0.6 {RATIO} (ref 1.1–2.2)
ALP SERPL-CCNC: 100 U/L (ref 45–117)
ALT SERPL-CCNC: 54 U/L (ref 12–78)
AMORPH CRY URNS QL MICRO: ABNORMAL
ANION GAP SERPL CALC-SCNC: 6 MMOL/L (ref 5–15)
APPEARANCE UR: CLEAR
AST SERPL-CCNC: 25 U/L (ref 15–37)
ATRIAL RATE: 79 BPM
BACTERIA URNS QL MICRO: NEGATIVE /HPF
BASOPHILS # BLD: 0.1 K/UL (ref 0–0.1)
BASOPHILS NFR BLD: 1 % (ref 0–1)
BILIRUB SERPL-MCNC: 0.4 MG/DL (ref 0.2–1)
BILIRUB UR QL: NEGATIVE
BUN SERPL-MCNC: 15 MG/DL (ref 6–20)
BUN/CREAT SERPL: 15 (ref 12–20)
CALCIUM SERPL-MCNC: 9.5 MG/DL (ref 8.5–10.1)
CALCULATED P AXIS, ECG09: 22 DEGREES
CALCULATED T AXIS, ECG11: 20 DEGREES
CHLORIDE SERPL-SCNC: 105 MMOL/L (ref 97–108)
CO2 SERPL-SCNC: 27 MMOL/L (ref 21–32)
COLOR UR: ABNORMAL
CREAT SERPL-MCNC: 1.02 MG/DL (ref 0.55–1.02)
DIAGNOSIS, 93000: NORMAL
DIFFERENTIAL METHOD BLD: ABNORMAL
EOSINOPHIL # BLD: 0.1 K/UL (ref 0–0.4)
EOSINOPHIL NFR BLD: 1 % (ref 0–7)
EPITH CASTS URNS QL MICRO: ABNORMAL /LPF
ERYTHROCYTE [DISTWIDTH] IN BLOOD BY AUTOMATED COUNT: 11.9 % (ref 11.5–14.5)
GLOBULIN SER CALC-MCNC: 5.2 G/DL (ref 2–4)
GLUCOSE SERPL-MCNC: 97 MG/DL (ref 65–100)
GLUCOSE UR STRIP.AUTO-MCNC: NEGATIVE MG/DL
HBV SURFACE AG SER QL: 0.14 INDEX
HBV SURFACE AG SER QL: NEGATIVE
HCT VFR BLD AUTO: 34.8 % (ref 35–47)
HCV AB SERPL QL IA: NONREACTIVE
HCV COMMENT,HCGAC: NORMAL
HGB BLD-MCNC: 11.4 G/DL (ref 11.5–16)
HGB UR QL STRIP: ABNORMAL
HIV1 P24 AG SERPL QL IA: NONREACTIVE
HIV1+2 AB SERPL QL IA: NONREACTIVE
IMM GRANULOCYTES # BLD AUTO: 0 K/UL (ref 0–0.04)
IMM GRANULOCYTES NFR BLD AUTO: 0 % (ref 0–0.5)
KETONES UR QL STRIP.AUTO: NEGATIVE MG/DL
LEUKOCYTE ESTERASE UR QL STRIP.AUTO: NEGATIVE
LIPASE SERPL-CCNC: 89 U/L (ref 73–393)
LYMPHOCYTES # BLD: 2.3 K/UL (ref 0.8–3.5)
LYMPHOCYTES NFR BLD: 24 % (ref 12–49)
MCH RBC QN AUTO: 31 PG (ref 26–34)
MCHC RBC AUTO-ENTMCNC: 32.8 G/DL (ref 30–36.5)
MCV RBC AUTO: 94.6 FL (ref 80–99)
MONOCYTES # BLD: 0.8 K/UL (ref 0–1)
MONOCYTES NFR BLD: 8 % (ref 5–13)
NEUTS SEG # BLD: 6.5 K/UL (ref 1.8–8)
NEUTS SEG NFR BLD: 66 % (ref 32–75)
NITRITE UR QL STRIP.AUTO: NEGATIVE
NRBC # BLD: 0 K/UL (ref 0–0.01)
NRBC BLD-RTO: 0 PER 100 WBC
P-R INTERVAL, ECG05: 178 MS
PH UR STRIP: 5.5 [PH] (ref 5–8)
PLATELET # BLD AUTO: 430 K/UL (ref 150–400)
PMV BLD AUTO: 9.2 FL (ref 8.9–12.9)
POTASSIUM SERPL-SCNC: 3.8 MMOL/L (ref 3.5–5.1)
PROT SERPL-MCNC: 8.5 G/DL (ref 6.4–8.2)
PROT UR STRIP-MCNC: ABNORMAL MG/DL
Q-T INTERVAL, ECG07: 376 MS
QRS DURATION, ECG06: 82 MS
QTC CALCULATION (BEZET), ECG08: 431 MS
RBC # BLD AUTO: 3.68 M/UL (ref 3.8–5.2)
RBC #/AREA URNS HPF: ABNORMAL /HPF (ref 0–5)
SODIUM SERPL-SCNC: 138 MMOL/L (ref 136–145)
SP GR UR REFRACTOMETRY: 1.01
TROPONIN I SERPL-MCNC: <0.05 NG/ML
UR CULT HOLD, URHOLD: NORMAL
UROBILINOGEN UR QL STRIP.AUTO: 1 EU/DL (ref 0.2–1)
VENTRICULAR RATE, ECG03: 79 BPM
WBC # BLD AUTO: 9.7 K/UL (ref 3.6–11)
WBC URNS QL MICRO: ABNORMAL /HPF (ref 0–4)

## 2020-05-23 PROCEDURE — 74177 CT ABD & PELVIS W/CONTRAST: CPT

## 2020-05-23 PROCEDURE — 81001 URINALYSIS AUTO W/SCOPE: CPT

## 2020-05-23 PROCEDURE — 93005 ELECTROCARDIOGRAM TRACING: CPT

## 2020-05-23 PROCEDURE — 84484 ASSAY OF TROPONIN QUANT: CPT

## 2020-05-23 PROCEDURE — 74011636320 HC RX REV CODE- 636/320: Performed by: EMERGENCY MEDICINE

## 2020-05-23 PROCEDURE — 96374 THER/PROPH/DIAG INJ IV PUSH: CPT

## 2020-05-23 PROCEDURE — 65660000000 HC RM CCU STEPDOWN

## 2020-05-23 PROCEDURE — 71275 CT ANGIOGRAPHY CHEST: CPT

## 2020-05-23 PROCEDURE — 80053 COMPREHEN METABOLIC PANEL: CPT

## 2020-05-23 PROCEDURE — 74011000258 HC RX REV CODE- 258: Performed by: EMERGENCY MEDICINE

## 2020-05-23 PROCEDURE — 96375 TX/PRO/DX INJ NEW DRUG ADDON: CPT

## 2020-05-23 PROCEDURE — 74011250636 HC RX REV CODE- 250/636: Performed by: EMERGENCY MEDICINE

## 2020-05-23 PROCEDURE — 36415 COLL VENOUS BLD VENIPUNCTURE: CPT

## 2020-05-23 PROCEDURE — 74011250636 HC RX REV CODE- 250/636: Performed by: HOSPITALIST

## 2020-05-23 PROCEDURE — 99285 EMERGENCY DEPT VISIT HI MDM: CPT

## 2020-05-23 PROCEDURE — 83690 ASSAY OF LIPASE: CPT

## 2020-05-23 PROCEDURE — 85025 COMPLETE CBC W/AUTO DIFF WBC: CPT

## 2020-05-23 PROCEDURE — 99284 EMERGENCY DEPT VISIT MOD MDM: CPT

## 2020-05-23 PROCEDURE — 74011250637 HC RX REV CODE- 250/637: Performed by: HOSPITALIST

## 2020-05-23 RX ORDER — OXYCODONE HYDROCHLORIDE 5 MG/1
5 TABLET ORAL
Status: DISCONTINUED | OUTPATIENT
Start: 2020-05-23 | End: 2020-05-25 | Stop reason: HOSPADM

## 2020-05-23 RX ORDER — HYDROMORPHONE HYDROCHLORIDE 2 MG/1
2 TABLET ORAL
COMMUNITY
End: 2021-03-01

## 2020-05-23 RX ORDER — KETOROLAC TROMETHAMINE 30 MG/ML
15 INJECTION, SOLUTION INTRAMUSCULAR; INTRAVENOUS
Status: COMPLETED | OUTPATIENT
Start: 2020-05-23 | End: 2020-05-23

## 2020-05-23 RX ORDER — ATORVASTATIN CALCIUM 40 MG/1
40 TABLET, FILM COATED ORAL
COMMUNITY
End: 2020-09-13

## 2020-05-23 RX ORDER — SODIUM CHLORIDE 9 MG/ML
50 INJECTION, SOLUTION INTRAVENOUS CONTINUOUS
Status: DISPENSED | OUTPATIENT
Start: 2020-05-23 | End: 2020-05-24

## 2020-05-23 RX ORDER — SODIUM CHLORIDE 0.9 % (FLUSH) 0.9 %
10 SYRINGE (ML) INJECTION
Status: COMPLETED | OUTPATIENT
Start: 2020-05-23 | End: 2020-05-23

## 2020-05-23 RX ORDER — ENOXAPARIN SODIUM 100 MG/ML
1 INJECTION SUBCUTANEOUS EVERY 12 HOURS
Status: DISCONTINUED | OUTPATIENT
Start: 2020-05-24 | End: 2020-05-24

## 2020-05-23 RX ORDER — SODIUM CHLORIDE 0.9 % (FLUSH) 0.9 %
5-40 SYRINGE (ML) INJECTION AS NEEDED
Status: DISCONTINUED | OUTPATIENT
Start: 2020-05-23 | End: 2020-05-25 | Stop reason: HOSPADM

## 2020-05-23 RX ORDER — ENOXAPARIN SODIUM 100 MG/ML
1 INJECTION SUBCUTANEOUS
Status: COMPLETED | OUTPATIENT
Start: 2020-05-23 | End: 2020-05-23

## 2020-05-23 RX ORDER — ACETAMINOPHEN 325 MG/1
650 TABLET ORAL
Status: DISCONTINUED | OUTPATIENT
Start: 2020-05-23 | End: 2020-05-23 | Stop reason: SDUPTHER

## 2020-05-23 RX ORDER — ACETAMINOPHEN 325 MG/1
650 TABLET ORAL
Status: DISCONTINUED | OUTPATIENT
Start: 2020-05-23 | End: 2020-05-25 | Stop reason: HOSPADM

## 2020-05-23 RX ORDER — ATORVASTATIN CALCIUM 40 MG/1
40 TABLET, FILM COATED ORAL
Status: DISCONTINUED | OUTPATIENT
Start: 2020-05-23 | End: 2020-05-25 | Stop reason: HOSPADM

## 2020-05-23 RX ORDER — SODIUM CHLORIDE 0.9 % (FLUSH) 0.9 %
5-40 SYRINGE (ML) INJECTION EVERY 8 HOURS
Status: DISCONTINUED | OUTPATIENT
Start: 2020-05-23 | End: 2020-05-25 | Stop reason: HOSPADM

## 2020-05-23 RX ORDER — ONDANSETRON 2 MG/ML
4 INJECTION INTRAMUSCULAR; INTRAVENOUS
Status: COMPLETED | OUTPATIENT
Start: 2020-05-23 | End: 2020-05-23

## 2020-05-23 RX ORDER — ATORVASTATIN CALCIUM 40 MG/1
40 TABLET, FILM COATED ORAL
COMMUNITY
End: 2020-05-23

## 2020-05-23 RX ADMIN — ATORVASTATIN CALCIUM 40 MG: 40 TABLET, FILM COATED ORAL at 21:35

## 2020-05-23 RX ADMIN — ONDANSETRON 4 MG: 2 INJECTION INTRAMUSCULAR; INTRAVENOUS at 12:57

## 2020-05-23 RX ADMIN — ENOXAPARIN SODIUM 90 MG: 100 INJECTION SUBCUTANEOUS at 13:13

## 2020-05-23 RX ADMIN — SODIUM CHLORIDE 100 ML: 900 INJECTION, SOLUTION INTRAVENOUS at 12:20

## 2020-05-23 RX ADMIN — IOPAMIDOL 100 ML: 755 INJECTION, SOLUTION INTRAVENOUS at 12:20

## 2020-05-23 RX ADMIN — OXYCODONE 5 MG: 5 TABLET ORAL at 14:58

## 2020-05-23 RX ADMIN — OXYCODONE 5 MG: 5 TABLET ORAL at 20:12

## 2020-05-23 RX ADMIN — Medication 10 ML: at 14:59

## 2020-05-23 RX ADMIN — SODIUM CHLORIDE 75 ML/HR: 900 INJECTION, SOLUTION INTRAVENOUS at 14:58

## 2020-05-23 RX ADMIN — SODIUM CHLORIDE 1000 ML: 900 INJECTION, SOLUTION INTRAVENOUS at 11:10

## 2020-05-23 RX ADMIN — Medication 10 ML: at 12:20

## 2020-05-23 RX ADMIN — KETOROLAC TROMETHAMINE 15 MG: 30 INJECTION, SOLUTION INTRAMUSCULAR at 12:17

## 2020-05-23 NOTE — PROGRESS NOTES
Admission Medication Reconciliation:          Spoke with patient by telephone @ [phone], unable to speak with patient face to face at this time due to general isolation precautions in the ED related to COVID-19 pandemic. Patient is a reliable historian. RX query is available at this time. Interview included questions regarding use of:  PTA medications including prescription/OTC, vitamins, supplements, inhaled, topical, injectable, otic and ophthalmic medications    Recommendation:  Birth control: due to PEs, strongly recommend discontinuation    Medication changes (since last review): Added:  Tylenol PM  Atorvastatin  Dilaudid: was given 10 tablets by physician, has taken 3    Deleted:  Doxylamine    Thank you for allowing me to participate in the care of your patient. Jolene Mccormick PharmD, RN #[id number] benefit data reflects medications filled and processed through the patient's insurance, however   this data does NOT capture whether the medication was picked up or is currently being taken by the patient. Allergies:  Percocet [oxycodone-acetaminophen]    Significant PMH/Disease States:   Past Medical History:   Diagnosis Date    Diverticulosis     Fibroids     Uterine fibroids    Hypercholesterolemia     Hypertension     Obesity (BMI 35.0-39.9 without comorbidity)     Rectal cancer [facility])      Chief Complaint for this Admission:    Chief Complaint   Patient presents with    Post-Op Problem     Prior to Admission Medications:   Prior to Admission Medications   Prescriptions Last Dose Informant Taking? HYDROmorphone (Dilaudid) 2 mg tablet 5/22/2020 at Unknown time  Yes   Sig: Take 2 mg by mouth every six (6) hours as needed for Pain. X 10 pills only   TAYTULLA 1 mg-20 mcg (24)/75 mg (4) cap 5/22/2020 at Unknown time  Yes   Sig: nightly. acetaminophen (Tylenol Extra Strength) 500 mg tablet   Yes   Sig: Take  by mouth every six (6) hours as needed for Pain.    acetaminophen 500 mg tab 500 mg, diphenhydrAMINE 25 mg cap 25 mg 5/16/2020 at Unknown time  Yes   Sig: Take 1 Dose by mouth nightly as needed. atorvastatin (LIPITOR) 40 mg tablet 5/22/2020 at Unknown time  Yes   Sig: Take 40 mg by mouth nightly. hydroCHLOROthiazide (HYDRODIURIL) 12.5 mg tablet 5/22/2020 at Unknown time  Yes   Sig: TAKE 1 TABLET DAILY      Facility-Administered Medications: None       Please contact the main inpatient pharmacy with any questions or concerns at [phone] and we will direct you to the clinical pharmacist covering this patient's care while in-house.    RENZO Rubi

## 2020-05-23 NOTE — PROGRESS NOTES
Primary Nurse Brock Payan RN and Gopal Wilkins RN performed a dual skin assessment on this patient Impairment noted- see wound doc flow sheet  Pt has post op incision to mid abdomen noted

## 2020-05-23 NOTE — PROGRESS NOTES
7222  -     Current Social History:  Mike Ochoa is a 47 y.o. admitted to  for pulmonary emboli - SEE HPI. She resides in [place] [facility] with her fiance and her 13yo son. Patient works at [employer] as an . Significant Medical Information: See chart notes    Preliminary Discharge Plan/Identified;  Demographic and Primary Care Provider (PCP) Kellie Kulkarni MD verified and correct. CM will continue to follow discharge planning needs for continuum of care. Scar Murphy RN, CRM    Reason for Admission: abdominal pain                     RUR Score:   11%                Plan for utilizing home health:  Not at this time. PCP:   Kellie Kulkarni MD      Are you a current patient: Yes/No:  Yes    Approximate date of last visit: UNK                    Current Advanced Directive/Advance Care Plan:  Not at this time. Maybe after she gets ! .   CM [employer]/Healthcare Agent : [address]                       Transition of Care Plan:    Home     Care Management Interventions  PCP Verified by CM:  Yes  Mode of Transport at Discharge: Self  MyChart Signup: No  Discharge Durable Medical Equipment: No  Health Maintenance Reviewed: Yes  Physical Therapy Consult: No  Occupational Therapy Consult: No  Speech Therapy Consult: No  Current Support Network: Lives with Spouse  Confirm Follow Up Transport: Family  Discharge Location  Discharge Placement: Home

## 2020-05-23 NOTE — ED NOTES
Bedside shift change report given to Brennon Ritter RN (oncoming nurse) by Anjel Omalley RN (offgoing nurse). Report included the following information SBAR, ED Summary, MAR and Recent Results.

## 2020-05-23 NOTE — PROGRESS NOTES
TRANSFER - IN REPORT:    Verbal report received from Harjinder(name) on Dionicia Frankel  being received from ED(unit) for routine progression of care      Report consisted of patients Situation, Background, Assessment and   Recommendations(SBAR). Information from the following report(s) SBAR, Kardex, ED Summary, Procedure Summary, MAR and Recent Results was reviewed with the receiving nurse. Opportunity for questions and clarification was provided. Assessment completed upon patients arrival to unit and care assumed.

## 2020-05-23 NOTE — PROGRESS NOTES
Problem: Falls - Risk of  Goal: *Absence of Falls  Description: Document Clydene Bone Fall Risk and appropriate interventions in the flowsheet.   Outcome: Progressing Towards Goal  Note: Fall Risk Interventions:                                Problem: Pulmonary Embolism Care Plan (Adult)  Goal: *Improvement of existing pulmonary embolism  Outcome: Progressing Towards Goal     Problem: Patient Education: Go to Patient Education Activity  Goal: Patient/Family Education  Outcome: Progressing Towards Goal

## 2020-05-23 NOTE — ED TRIAGE NOTES
Pt S/P colon resection on 5/7/20 d/t colon CA c/o pain since Wednesday. Referred to ED by Dr. Yosvany Yee for further evaluation.

## 2020-05-23 NOTE — H&P
[address]   HISTORY AND PHYSICAL    Name:  Inessa Wong  MR#:  [id number]  :  1970  ACCOUNT #:  [de-identified]  ADMIT DATE:  2020      PRIMARY CARE PROVIDER:  Adriana Lama MD    SOURCE OF INFORMATION:  The patient. CHIEF COMPLAINT:  Progressive shortness of breath since 2020. HISTORY OF PRESENT ILLNESS:  This is a 48-year-old [facility] American woman with past medical history significant for status post cholecystectomy in , and status post colon resection for colon cancer on 2020, hypertension, and hypercholesterolemia presented to [facility] Emergency Department with progressive shortness of breath since 2020. She stated that she started to have shortness of breath on 2020, and because she had surgery recently she thought it as gas pain. She took Tums and pain killer. Pain improved. Yesterday, she started to have again shortness of breath with sharp pain on the left side. She took pain medication and improved. When she woke up this morning, her shortness of breath progressively worsened associated with pleuritic left-sided chest pain and decided to come to [facility] Emergency Department. No fevers, chills, sweating, cough, nausea, vomiting, abdominal pain or abnormal bowel movement. No lower extremity swelling. She has frequency of urination. No lower abdominal pain or dysuria. In ER, her vital signs, blood pressure was 123/76, pulse 99, temperature 98.2, saturation of oxygen 100% on room air. CTA of the chest was done and study showed mild to moderate proximal pulmonary emboli without evidence of right ventricular strain or pericardial effusion, small left effusion with minimal bibasilar atelectasis. She received Lovenox 90 mg subcutaneous and colorectal surgeon consulted and the patient referred to hospitalist service for further evaluation and admission. REVIEW OF SYSTEMS:  Pertinent positive findings mentioned in HPI.   All systems reviewed, no any other positive finding. PAST MEDICAL HISTORY  1. Colon cancer status post colectomy on 05/07/2020.  2.  Diverticulosis. 3.  Fibroid. 4.  Hypercholesterolemia. 5.  Hypertension. 6.  Obesity. PRIOR TO ADMISSION MEDICATIONS:  1. Tylenol 650 mg every 6 hours as needed. 2.  Hydrochlorothiazide 12.5 mg p.o. daily. 3.  Lipitor 40 mg p.o. daily. 4.  Taytulla 1 mg - 20 mcg at bedtime. ALLERGIES:  PERCOCET. SOCIAL HISTORY:  Lives with her fiance. No tobacco or alcohol abuse. Ambulates independently. FAMILY HISTORY:  Mother, history of asthma. Father passed away related to colon cancer. PHYSICAL EXAMINATION:  VITAL SIGNS:  Blood pressure 123/76, pulse 99, temperature 98.2, saturation of oxygen 100% on room air. GENERAL APPEARANCE:  The patient is alert, cooperative, not in cardiorespiratory distress. HEENT:  Pink conjunctivae and anicteric sclera. Moist tongue and buccal mucosa. LUNGS:  Decreased bronchial breath sounds to auscultation bilaterally. CHEST:  No tenderness or deformity. CARDIOVASCULAR SYSTEM:  Regular rate and rhythm. S1 and S2 normal.  No murmur or gallop. ABDOMEN:  Soft, nontender. Bowel sounds normal.  No mass or organomegaly. EXTREMITIES:  No cyanosis or edema. Negative Tol[place] sign. SKIN:  No rash or lesion. CENTRAL NERVOUS SYSTEM:  Conscious, well oriented to time, place and person. Motor 5/5. Sensation intact. Cranial nerves II-XII grossly intact. LABORATORY DATA:  EKG normal sinus rhythm, ventricular rate 79 beats per minute, nonspecific ST-T wave. Comparing to EKG of 04/09/2020, no significant change. White blood cell count 9.7, hemoglobin 11.4, hematocrit 34.8, MCV 94.6, platelet count 714. Urinalysis:  White blood cell count 0-4, leukocyte esterase negative, nitrite negative.   Chemistry:  Sodium 138, potassium 3.8, chloride 105, CO2 of 27, anion gap 6, glucose 97, BUN 15, creatinine 1.02, BUN and creatinine ratio of 15, calcium 9.5. Total bilirubin 0.4, ALT 54, AST 25, alkaline phosphatase 100, lipase 89. Troponin less than 0.05. CTA of the chest, mild to moderate proximal pulmonary emboli without evidence of right ventricular strain or pericardial effusion, small left effusion with minimal bibasilar atelectasis. No aortic aneurysm or dissection, postprocedural change related to recent proctocolectomy. No other acute processes identified in the chest, abdomen or pelvis. CT abdomen and pelvis, no acute process. ASSESSMENT:  1. Acute pulmonary emboli. 2.  Anemia of chronic disease. 3.  Hypertension. 4.  Hypercholesterolemia. 5.  Urinary frequency. 6.  Obesity. PLAN:  1. Acute pulmonary emboli. Admit the patient to telemetry floor. Received Lovenox, in ER, continue Lovenox 1 mg subcutaneous q.12 ,  will switch to p.o. Eliquis on discharge, check stool for Hemoccult, monitor pulse oximetry, p.r.n. pain medication, Doppler study of the lower extremities and echocardiography. 2.  Anemia of chronic disease. H and H stable. will do stool for Hemoccult as the patient is on blood thinner right now. 3.  Hypertension. Blood pressure is normal.  Hold home hydrochlorothiazide. 4.  History of hypercholesterolemia. We will continue her statin. 5.  Obesity. Advised the patient diet and weight loss program.    Deep venous thrombosis prophylaxis. The patient on therapeutic Lovenox. FUNCTIONAL STATUS PRIOR TO ADMISSION:  The patient ambulates independently.         MD JONH [id number]  D:  05/23/2020 13:57  T:  05/23/2020 16:33  JOB #:  [id number]

## 2020-05-23 NOTE — ROUTINE PROCESS
TRANSFER - OUT REPORT: 
 
Verbal report given to KIRSTEN Camarena (name) on Deejay Dias  being transferred to 3N (unit) for routine progression of care Report consisted of patients Situation, Background, Assessment and  
Recommendations(SBAR). Information from the following report(s) SBAR, ED Summary, MAR, Recent Results and Cardiac Rhythm NSR was reviewed with the receiving nurse. Lines:  
Peripheral IV 05/23/20 Right Antecubital (Active) Opportunity for questions and clarification was provided. Patient transported with: 
Suzi

## 2020-05-23 NOTE — ED NOTES
Pt returned from bathroom. Currently resting on ED stretcher. Call bell in reach. Monitor x3. Reports 7/10 abdominal pain- worse after exertion. MD notified.

## 2020-05-24 ENCOUNTER — APPOINTMENT (OUTPATIENT)
Dept: VASCULAR SURGERY | Age: 50
DRG: 176 | End: 2020-05-24
Attending: HOSPITALIST
Payer: COMMERCIAL

## 2020-05-24 ENCOUNTER — APPOINTMENT (OUTPATIENT)
Dept: NON INVASIVE DIAGNOSTICS | Age: 50
DRG: 176 | End: 2020-05-24
Attending: HOSPITALIST
Payer: COMMERCIAL

## 2020-05-24 LAB
ALBUMIN SERPL-MCNC: 2.5 G/DL (ref 3.5–5)
ALBUMIN/GLOB SERPL: 0.6 {RATIO} (ref 1.1–2.2)
ALP SERPL-CCNC: 78 U/L (ref 45–117)
ALT SERPL-CCNC: 38 U/L (ref 12–78)
ANION GAP SERPL CALC-SCNC: 5 MMOL/L (ref 5–15)
AST SERPL-CCNC: 17 U/L (ref 15–37)
AV VELOCITY RATIO: 0.86
BILIRUB SERPL-MCNC: 0.4 MG/DL (ref 0.2–1)
BUN SERPL-MCNC: 10 MG/DL (ref 6–20)
BUN/CREAT SERPL: 11 (ref 12–20)
CALCIUM SERPL-MCNC: 8.5 MG/DL (ref 8.5–10.1)
CHLORIDE SERPL-SCNC: 107 MMOL/L (ref 97–108)
CO2 SERPL-SCNC: 25 MMOL/L (ref 21–32)
CREAT SERPL-MCNC: 0.92 MG/DL (ref 0.55–1.02)
ECHO AV AREA PEAK VELOCITY: 2.9 CM2
ECHO AV PEAK GRADIENT: 5.3 MMHG
ECHO AV PEAK VELOCITY: 114.77 CM/S
ECHO LA AREA 4C: 13.8 CM2
ECHO LA MAJOR AXIS: 3.21 CM
ECHO LA VOL 2C: 33.87 ML (ref 22–52)
ECHO LA VOL 4C: 29.07 ML (ref 22–52)
ECHO LA VOL BP: 34.22 ML (ref 22–52)
ECHO LA VOL/BSA BIPLANE: 18.1 ML/M2 (ref 16–28)
ECHO LA VOLUME INDEX A2C: 17.92 ML/M2 (ref 16–28)
ECHO LA VOLUME INDEX A4C: 15.38 ML/M2 (ref 16–28)
ECHO LV E' LATERAL VELOCITY: 11.91 CM/S
ECHO LV E' SEPTAL VELOCITY: 10.14 CM/S
ECHO LV INTERNAL DIMENSION DIASTOLIC: 4.45 CM (ref 3.9–5.3)
ECHO LV INTERNAL DIMENSION SYSTOLIC: 2.72 CM
ECHO LV IVSD: 0.75 CM (ref 0.6–0.9)
ECHO LV MASS 2D: 143.6 G (ref 67–162)
ECHO LV MASS INDEX 2D: 76 G/M2 (ref 43–95)
ECHO LV POSTERIOR WALL DIASTOLIC: 1.01 CM (ref 0.6–0.9)
ECHO LVOT DIAM: 2.05 CM
ECHO LVOT PEAK GRADIENT: 3.9 MMHG
ECHO LVOT PEAK VELOCITY: 98.99 CM/S
ECHO MV A VELOCITY: 75.95 CM/S
ECHO MV AREA PHT: 3.8 CM2
ECHO MV E DECELERATION TIME (DT): 202.3 MS
ECHO MV E VELOCITY: 80.97 CM/S
ECHO MV E/A RATIO: 1.07
ECHO MV E/E' LATERAL: 6.8
ECHO MV E/E' RATIO (AVERAGED): 7.39
ECHO MV E/E' SEPTAL: 7.99
ECHO MV PRESSURE HALF TIME (PHT): 58.7 MS
ECHO PV MAX VELOCITY: 90.07 CM/S
ECHO PV PEAK GRADIENT: 3.2 MMHG
ECHO RV TAPSE: 1.63 CM (ref 1.5–2)
ECHO TV REGURGITANT MAX VELOCITY: 227.11 CM/S
ECHO TV REGURGITANT PEAK GRADIENT: 20.6 MMHG
ERYTHROCYTE [DISTWIDTH] IN BLOOD BY AUTOMATED COUNT: 11.8 % (ref 11.5–14.5)
GLOBULIN SER CALC-MCNC: 4.4 G/DL (ref 2–4)
GLUCOSE SERPL-MCNC: 104 MG/DL (ref 65–100)
HCT VFR BLD AUTO: 27.8 % (ref 35–47)
HGB BLD-MCNC: 9.1 G/DL (ref 11.5–16)
LVFS 2D: 38.95 %
MCH RBC QN AUTO: 31.1 PG (ref 26–34)
MCHC RBC AUTO-ENTMCNC: 32.7 G/DL (ref 30–36.5)
MCV RBC AUTO: 94.9 FL (ref 80–99)
MV DEC SLOPE: 4
NRBC # BLD: 0 K/UL (ref 0–0.01)
NRBC BLD-RTO: 0 PER 100 WBC
PLATELET # BLD AUTO: 405 K/UL (ref 150–400)
PMV BLD AUTO: 9.3 FL (ref 8.9–12.9)
POTASSIUM SERPL-SCNC: 3.9 MMOL/L (ref 3.5–5.1)
PROT SERPL-MCNC: 6.9 G/DL (ref 6.4–8.2)
RBC # BLD AUTO: 2.93 M/UL (ref 3.8–5.2)
SODIUM SERPL-SCNC: 137 MMOL/L (ref 136–145)
WBC # BLD AUTO: 8.9 K/UL (ref 3.6–11)

## 2020-05-24 PROCEDURE — 93970 EXTREMITY STUDY: CPT

## 2020-05-24 PROCEDURE — 74011250637 HC RX REV CODE- 250/637: Performed by: HOSPITALIST

## 2020-05-24 PROCEDURE — 36415 COLL VENOUS BLD VENIPUNCTURE: CPT

## 2020-05-24 PROCEDURE — 93306 TTE W/DOPPLER COMPLETE: CPT

## 2020-05-24 PROCEDURE — 74011250636 HC RX REV CODE- 250/636: Performed by: HOSPITALIST

## 2020-05-24 PROCEDURE — 80053 COMPREHEN METABOLIC PANEL: CPT

## 2020-05-24 PROCEDURE — 65660000000 HC RM CCU STEPDOWN

## 2020-05-24 PROCEDURE — 85027 COMPLETE CBC AUTOMATED: CPT

## 2020-05-24 RX ADMIN — Medication 10 ML: at 19:43

## 2020-05-24 RX ADMIN — ATORVASTATIN CALCIUM 40 MG: 40 TABLET, FILM COATED ORAL at 22:09

## 2020-05-24 RX ADMIN — APIXABAN 10 MG: 5 TABLET, FILM COATED ORAL at 11:58

## 2020-05-24 RX ADMIN — OXYCODONE 5 MG: 5 TABLET ORAL at 04:37

## 2020-05-24 RX ADMIN — ENOXAPARIN SODIUM 90 MG: 100 INJECTION SUBCUTANEOUS at 00:50

## 2020-05-24 RX ADMIN — Medication 10 ML: at 22:11

## 2020-05-24 RX ADMIN — OXYCODONE 5 MG: 5 TABLET ORAL at 13:10

## 2020-05-24 RX ADMIN — OXYCODONE 5 MG: 5 TABLET ORAL at 22:10

## 2020-05-24 RX ADMIN — APIXABAN 10 MG: 5 TABLET, FILM COATED ORAL at 20:40

## 2020-05-24 NOTE — PROGRESS NOTES
Problem: Falls - Risk of  Goal: *Absence of Falls  Description: Document Marni Billings Fall Risk and appropriate interventions in the flowsheet. Outcome: Progressing Towards Goal  Note: Fall Risk Interventions:            Medication Interventions: Evaluate medications/consider consulting pharmacy, Teach patient to arise slowly                   Problem: Pulmonary Embolism Care Plan (Adult)  Goal: *Improvement of existing pulmonary embolism  Outcome: Progressing Towards Goal  Goal: *Absence of bleeding  Outcome: Progressing Towards Goal  Goal: *Labs within defined limits  Outcome: Progressing Towards Goal     0745: Bedside and Verbal shift change report given to [address] [place] [address] (oncoming nurse) by Julieta Mims RN (offgoing nurse). Report included the following information SBAR, Kardex, ED Summary, Procedure Summary, Intake/Output, MAR and Cardiac Rhythm NSR.

## 2020-05-24 NOTE — PROGRESS NOTES
Bedside shift change report given to [address] (oncoming nurse) by BRANDEN Altamirano (offgoing nurse). Report included the following information SBAR, Kardex, Procedure Summary, MAR and Recent Results.

## 2020-05-24 NOTE — PROGRESS NOTES
Problem: Falls - Risk of  Goal: *Absence of Falls  Description: Document Ariadne Forman Fall Risk and appropriate interventions in the flowsheet.   Outcome: Progressing Towards Goal  Note: Fall Risk Interventions:            Medication Interventions: Evaluate medications/consider consulting pharmacy, Teach patient to arise slowly                   Problem: Pulmonary Embolism Care Plan (Adult)  Goal: *Absence of bleeding  Outcome: Progressing Towards Goal  Goal: *Labs within defined limits  Outcome: Progressing Towards Goal

## 2020-05-24 NOTE — PROGRESS NOTES
[address] [facility]                                                                                          Hospitalist Progress Note  Ramón Sheffield MD  Answering service: [phone] from in house phone        Date of Service:  2020  NAME:  Kimberly Reagan  :  1970  MRN:  [id number]      Admission Summary: This is a 70[facility] with past medical history significant for status post cholecystectomy in , and status post colon resection for colon cancer on 2020, hypertension, and hypercholesterolemia presented to [facility] Emergency Department with progressive shortness of breath since 2020. Interval history / Subjective:   She said she feels better this morning, pleuritic pain improving, no shortness of breath     Assessment & Plan:     Acute PE  -on lovenox 1 mg/kg sc q 12, switch to po Eliquis 10 mg bid for 7 days then 5 mg bid  -home Taytulla is stopped   -monitor pulse ox and prn oxygen   -H/H dropping, BP low normal, no evidence of bleeding  -lower extremities doppler study no evidence of deep vein thrombosis  -Echo is pending to rule out right heart strain        Hx of colon cancer s/p colectomy   -seen by colon and rectal surgeon,     Anemia of chronic disease. -H/H trending down may be hemodilution, no rectal  Bleeding  -monitor CBC    HTN  -BP low normal, home hydrochlorothiazide is on hold, monitor BP    Hypercholesterolemia.  -continue lipitor     Urinary frequency.   -afebrile, no leukocytosis   -UA unremarkable  -no     Obesity.  -advised diet and weight loss program           Code status:Full Code   DVT prophylaxis: Lovenox    Care Plan discussed with: Patient/Family and Nurse  Anticipated Disposition: Home w/Family  Anticipated Discharge: 24 hours to 48 hours     Hospital Problems  Date Reviewed: 2020          Codes Class Noted POA    Acute pulmonary embolism ([facility] [address] ICD-10-CM: I26.99  ICD-9-CM: 415.19  5/23/2020 Unknown                Vital Signs:    Last 24hrs VS reviewed since prior progress note. Most recent are:  Visit Vitals  BP (!) 106/5   Pulse 77   Temp 97.8 °F (36.6 °C)   Resp 18   Ht 5' 3\" (1.6 m)   Wt 86 kg (189 lb 9.5 oz)   SpO2 99%   Breastfeeding No   BMI 33.59 kg/m²         Intake/Output Summary (Last 24 hours) at 5/24/2020 1026  Last data filed at 5/24/2020 0700  Gross per 24 hour   Intake 2580 ml   Output    Net 2580 ml        Physical Examination:             Constitutional:  No acute distress, cooperative, pleasant    ENT:  Oral mucosa moist, oropharynx benign. Resp:  Decrease bronchial breath sound bilaterally. No wheezing/rhonchi/rales. No accessory muscle use   CV:  Regular rhythm, normal rate, no murmurs, gallops, rubs    GI:  Soft, non distended, non tender. normoactive bowel sounds, no hepatosplenomegaly     Musculoskeletal:  No edema,     Neurologic:  Moves all extremities. AAOx3, CN II-XII reviewed     Skin:  Good turgor, no rashes or ulcers       Data Review:    Review and/or order of clinical lab test  Review and/or order of tests in the radiology section of CPT  Review and/or order of tests in the medicine section of CPT      Labs:     Recent Labs     05/24/20  0451 05/23/20  1108   WBC 8.9 9.7   HGB 9.1* 11.4*   HCT 27.8* 34.8*   * 430*     Recent Labs     05/24/20  0451 05/23/20  1108    138   K 3.9 3.8    105   CO2 25 27   BUN 10 15   CREA 0.92 1.02   * 97   CA 8.5 9.5     Recent Labs     05/24/20  0451 05/23/20  1108   SGOT 17 25   ALT 38 54   AP 78 100   TBILI 0.4 0.4   TP 6.9 8.5*   ALB 2.5* 3.3*   GLOB 4.4* 5.2*   LPSE  --  89     No results for input(s): INR, PTP, APTT, INREXT, INREXT in the last 72 hours. No results for input(s): FE, TIBC, PSAT, FERR in the last 72 hours. No results found for: FOL, RBCF   No results for input(s): PH, PCO2, PO2 in the last 72 hours.   Recent Labs     05/23/20  1108   TROIQ <0.05     Lab Results Component Value Date/Time    Cholesterol, total 152 02/10/2020 08:16 AM    HDL Cholesterol 36 (L) 02/10/2020 08:16 AM    LDL, calculated 98 02/10/2020 08:16 AM    Triglyceride 92 02/10/2020 08:16 AM    CHOL/HDL Ratio 4.3 09/30/2009 10:41 AM     No results found for: Azra Tavares  Lab Results   Component Value Date/Time    Color YELLOW/STRAW 05/23/2020 12:54 PM    Appearance CLEAR 05/23/2020 12:54 PM    Specific gravity 1.015 05/23/2020 12:54 PM    pH (UA) 5.5 05/23/2020 12:54 PM    Protein TRACE (A) 05/23/2020 12:54 PM    Glucose Negative 05/23/2020 12:54 PM    Ketone Negative 05/23/2020 12:54 PM    Bilirubin Negative 05/23/2020 12:54 PM    Urobilinogen 1.0 05/23/2020 12:54 PM    Nitrites Negative 05/23/2020 12:54 PM    Leukocyte Esterase Negative 05/23/2020 12:54 PM    Epithelial cells MODERATE (A) 05/23/2020 12:54 PM    Bacteria Negative 05/23/2020 12:54 PM    WBC 0-4 05/23/2020 12:54 PM    RBC 0-5 05/23/2020 12:54 PM         Medications Reviewed:     Current Facility-Administered Medications   Medication Dose Route Frequency    atorvastatin (LIPITOR) tablet 40 mg  40 mg Oral QHS    sodium chloride (NS) flush 5-40 mL  5-40 mL IntraVENous Q8H    sodium chloride (NS) flush 5-40 mL  5-40 mL IntraVENous PRN    enoxaparin (LOVENOX) injection 90 mg  1 mg/kg SubCUTAneous Q12H    0.9% sodium chloride infusion  75 mL/hr IntraVENous CONTINUOUS    acetaminophen (TYLENOL) tablet 650 mg  650 mg Oral Q4H PRN    oxyCODONE IR (ROXICODONE) tablet 5 mg  5 mg Oral Q4H PRN     ______________________________________________________________________  EXPECTED LENGTH OF STAY: - - -  ACTUAL LENGTH OF STAY:          1                 Bebeto Hodges MD

## 2020-05-24 NOTE — PROGRESS NOTES
Bedside shift change report given to Rosalia Cm (oncoming nurse) by BRANDEN Altamirano (offgoing nurse). Report included the following information SBAR, Kardex, Procedure Summary, MAR and Recent Results.

## 2020-05-24 NOTE — CONSULTS
Colorectal Surgery Courtesy Consult    The patient presented to the ER yesterday with chest pain and dyspnea. She was diagnosed with bilateral pulmonary emboli and admitted by the Hospitalist Service for management. A CT scan of the abdomen and pelvis revealed no evidence of an abscess, anastomotic leak, bowel obstruction, or other intraabdominal problem, and her abdominal examination today reveals no evidence of wound infection. From my standpoint, I think that it should be reasonably safe to therapeutically anticoagulate her. She could experience anastomotic bleeding, but her anastomosis is not far from the anus; so it should be obvious if it does.

## 2020-05-25 VITALS
RESPIRATION RATE: 18 BRPM | DIASTOLIC BLOOD PRESSURE: 78 MMHG | HEIGHT: 63 IN | HEART RATE: 92 BPM | BODY MASS INDEX: 34.02 KG/M2 | WEIGHT: 192.02 LBS | SYSTOLIC BLOOD PRESSURE: 120 MMHG | TEMPERATURE: 98 F | OXYGEN SATURATION: 98 %

## 2020-05-25 LAB
ERYTHROCYTE [DISTWIDTH] IN BLOOD BY AUTOMATED COUNT: 11.7 % (ref 11.5–14.5)
HCT VFR BLD AUTO: 29.6 % (ref 35–47)
HGB BLD-MCNC: 9.6 G/DL (ref 11.5–16)
MCH RBC QN AUTO: 30.6 PG (ref 26–34)
MCHC RBC AUTO-ENTMCNC: 32.4 G/DL (ref 30–36.5)
MCV RBC AUTO: 94.3 FL (ref 80–99)
NRBC # BLD: 0 K/UL (ref 0–0.01)
NRBC BLD-RTO: 0 PER 100 WBC
PLATELET # BLD AUTO: 406 K/UL (ref 150–400)
PMV BLD AUTO: 9.2 FL (ref 8.9–12.9)
RBC # BLD AUTO: 3.14 M/UL (ref 3.8–5.2)
WBC # BLD AUTO: 6.6 K/UL (ref 3.6–11)

## 2020-05-25 PROCEDURE — 36415 COLL VENOUS BLD VENIPUNCTURE: CPT

## 2020-05-25 PROCEDURE — 74011250637 HC RX REV CODE- 250/637: Performed by: HOSPITALIST

## 2020-05-25 PROCEDURE — 94761 N-INVAS EAR/PLS OXIMETRY MLT: CPT

## 2020-05-25 PROCEDURE — 85027 COMPLETE CBC AUTOMATED: CPT

## 2020-05-25 RX ORDER — POLYETHYLENE GLYCOL 3350 17 G/17G
17 POWDER, FOR SOLUTION ORAL DAILY
Status: DISCONTINUED | OUTPATIENT
Start: 2020-05-25 | End: 2020-05-25 | Stop reason: HOSPADM

## 2020-05-25 RX ADMIN — APIXABAN 10 MG: 5 TABLET, FILM COATED ORAL at 08:24

## 2020-05-25 RX ADMIN — POLYETHYLENE GLYCOL 3350 17 G: 17 POWDER, FOR SOLUTION ORAL at 11:11

## 2020-05-25 RX ADMIN — Medication 5 ML: at 05:46

## 2020-05-25 NOTE — PROGRESS NOTES
Patient tolerated 6MW very well on room air. She does not need oxygen upon discharge.          05/25/20 1331   RT Walking Oximetry   Stage During Walk (Room Air)   SpO2 96 %  (SpO2 ranged from 96-98%)    bpm  (HR ranged from  BPM)   Rate of Dyspnea 0   Symptoms Chest Tightness  (states that she gets spasms because of the blood clots)   O2 Device None (Room air)   O2 Flow Rate (L/min)   (No)   Walk/Assistive Device   (None)

## 2020-05-25 NOTE — PROGRESS NOTES
Problem: Falls - Risk of  Goal: *Absence of Falls  Description: Document Lynda Estrada Fall Risk and appropriate interventions in the flowsheet.   5/25/2020 0729 by Jose Allen  Outcome: Progressing Towards Goal  Note: Fall Risk Interventions:            Medication Interventions: Teach patient to arise slowly                5/25/2020 0727 by Jose Allen  Outcome: Progressing Towards Goal  Note: Fall Risk Interventions:            Medication Interventions: Teach patient to arise slowly                   Problem: Pulmonary Embolism Care Plan (Adult)  Goal: *Improvement of existing pulmonary embolism  Outcome: Progressing Towards Goal  Goal: *Absence of bleeding  Outcome: Progressing Towards Goal  Goal: *Labs within defined limits  Outcome: Progressing Towards Goal

## 2020-05-25 NOTE — PROGRESS NOTES
Bedside and Verbal shift change report given to Natalya Stern RN (oncoming nurse) by Ammy Sierra (offgoing nurses). Report included the following information SBAR, Kardex, Intake/Output, MAR, Recent Results and Cardiac Rhythm NSR.

## 2020-05-25 NOTE — DISCHARGE INSTRUCTIONS
Discharge Instructions       PATIENT ID: Razia Jefferson  MRN: [id number]   YOB: 1970    DATE OF ADMISSION: 5/23/2020 10:36 AM    DATE OF DISCHARGE: 5/25/2020    PRIMARY CARE PROVIDER: Bibiana Chilel MD     ATTENDING PHYSICIAN: Lorena Zendejas MD  DISCHARGING PROVIDER: Arti Jarvis MD    To contact this individual call [phone] and ask the  to page. If unavailable ask to be transferred the Adult Hospitalist Department. DISCHARGE DIAGNOSES   Acute PE  Hx of colon cancer s/p colectomy   Anemia of chronic disease. HTN  Hypercholesterolemia. Urinary frequency. Obesity. CONSULTATIONS: IP CONSULT TO COLORECTAL SURGERY    PROCEDURES/SURGERIES: * No surgery found *    PENDING TEST RESULTS:   At the time of discharge the following test results are still pending: None    FOLLOW UP APPOINTMENTS:   Follow-up Information     Follow up With Specialties Details Why Contact Info   1. Bibiana Chilel MD Family Practice In one week   [address]  [phone]       2. Frank Mercado Colorectal Surgery in 2 weeks    ADDITIONAL CARE RECOMMENDATIONS:     DIET: Regular Diet    ACTIVITY: Activity as tolerated    WOUND CARE: None    EQUIPMENT needed: None      DISCHARGE MEDICATIONS:   See Medication Reconciliation Form    · It is important that you take the medication exactly as they are prescribed. · Keep your medication in the bottles provided by the pharmacist and keep a list of the medication names, dosages, and times to be taken in your wallet. · Do not take other medications without consulting your doctor. NOTIFY YOUR PHYSICIAN FOR ANY OF THE FOLLOWING:   Fever over 101 degrees for 24 hours. Chest pain, shortness of breath, fever, chills, nausea, vomiting, diarrhea, change in mentation, falling, weakness, bleeding. Severe pain or pain not relieved by medications.   Or, any other signs or symptoms that you may have questions about.      DISPOSITION:    Home With:   OT  PT  HH  RN       SNF/Inpatient Rehab/LTAC   x Independent/assisted living    Hospice    Other:     CDMP Checked:   Yes x     PROBLEM LIST Updated:  Yes x       Signed:   Arti Jarvis MD  5/25/2020  9:44 AM

## 2020-05-25 NOTE — PROGRESS NOTES
[address] [facility]                                                                                          Hospitalist Progress Note  Wesley Garcia MD  Answering service: [phone] from in house phone        Date of Service:  2020  NAME:  Arturo Dorsey  :  1970  MRN:  [id number]      Admission Summary: This is a 50-year-old [facility] American woman with past medical history significant for status post cholecystectomy in , and status post colon resection for colon cancer on 2020, hypertension, and hypercholesterolemia presented to [facility] Emergency Department with progressive shortness of breath since 2020. Interval history / Subjective:     she said she feels better, no chest pain, on and off pleuritic pain     Assessment & Plan:     Acute PE  -off lovenox 1 mg/kg sc q 12 on ,  -started on Eliquis 10 mg bid on  for 7 days then 5 mg bid  -home Taytulla is stopped   -monitor pulse ox and prn oxygen   -H/H stable BP improved no evidence of bleeding  -lower extremities doppler study no evidence of deep vein thrombosis  -EchoNormal cavity size, wall thickness and systolic function (ejection fraction normal). The estimated ejection fraction is 55%. No regional wall motion abnormality noted. Right Ventricle: Normal cavity size, wall thickness and global systolic function. Hx of colon cancer s/p colectomy   -seen by colon and rectal surgeon,     Anemia of chronic disease. -H/H stable, no rectal  Bleeding  -monitor CBC    HTN  -BP normal, advise to check her BP at home and to hydrochlorothiazide if her BP >130/80, monitor BP    Hypercholesterolemia.  -continue lipitor     Urinary frequency.   -afebrile, no leukocytosis   -UA unremarkable  -no     Obesity.  -advised diet and weight loss program           Code status:Full Code   DVT prophylaxis: Lovenox    Care Plan discussed with: Patient/Family and Nurse  Anticipated Disposition: Home w/Family  Anticipated Discharge: 24 hours to 48 hours     Hospital Problems  Date Reviewed: 1/13/2020          Codes Class Noted POA    Acute pulmonary embolism ([facility] [address] ICD-10-CM: I26.99  ICD-9-CM: 415.19  5/23/2020 Unknown                Vital Signs:    Last 24hrs VS reviewed since prior progress note. Most recent are:  Visit Vitals  /70 (BP 1 Location: Left arm, BP Patient Position: At rest)   Pulse 80   Temp 97.5 °F (36.4 °C)   Resp 18   Ht 5' 3\" (1.6 m)   Wt 87.1 kg (192 lb 0.3 oz)   SpO2 98%   Breastfeeding No   BMI 34.01 kg/m²         Intake/Output Summary (Last 24 hours) at 5/25/2020 5022  Last data filed at 5/24/2020 1310  Gross per 24 hour   Intake 240 ml   Output    Net 240 ml        Physical Examination:             Constitutional:  No acute distress, cooperative, pleasant    ENT:  Oral mucosa moist, oropharynx benign. Resp:  Clear to auscultation  bilaterally. No wheezing/rhonchi/rales. No accessory muscle use   CV:  Regular rhythm, normal rate, no murmurs, gallops, rubs    GI:  Soft, non distended, non tender. normoactive bowel sounds, no hepatosplenomegaly     Musculoskeletal:  No edema,     Neurologic:  Moves all extremities.   AAOx3, CN II-XII reviewed     Skin:  Good turgor, no rashes or ulcers       Data Review:    Review and/or order of clinical lab test  Review and/or order of tests in the radiology section of CPT  Review and/or order of tests in the medicine section of CPT      Labs:     Recent Labs     05/25/20 0523 05/24/20  0451   WBC 6.6 8.9   HGB 9.6* 9.1*   HCT 29.6* 27.8*   * 405*     Recent Labs     05/24/20  0451 05/23/20  1108    138   K 3.9 3.8    105   CO2 25 27   BUN 10 15   CREA 0.92 1.02   * 97   CA 8.5 9.5     Recent Labs     05/24/20  0451 05/23/20  1108   SGOT 17 25   ALT 38 54   AP 78 100   TBILI 0.4 0.4   TP 6.9 8.5*   ALB 2.5* 3.3*   GLOB 4.4* 5.2*   LPSE  --  89     No results for input(s): INR, PTP, APTT, INREXT, INREXT in the last 72 hours. No results for input(s): FE, TIBC, PSAT, FERR in the last 72 hours. No results found for: FOL, RBCF   No results for input(s): PH, PCO2, PO2 in the last 72 hours.   Recent Labs     05/23/20  1108   TROIQ <0.05     Lab Results   Component Value Date/Time    Cholesterol, total 152 02/10/2020 08:16 AM    HDL Cholesterol 36 (L) 02/10/2020 08:16 AM    LDL, calculated 98 02/10/2020 08:16 AM    Triglyceride 92 02/10/2020 08:16 AM    CHOL/HDL Ratio 4.3 09/30/2009 10:41 AM     No results found for: [facility]  Lab Results   Component Value Date/Time    Color YELLOW/STRAW 05/23/2020 12:54 PM    Appearance CLEAR 05/23/2020 12:54 PM    Specific gravity 1.015 05/23/2020 12:54 PM    pH (UA) 5.5 05/23/2020 12:54 PM    Protein TRACE (A) 05/23/2020 12:54 PM    Glucose Negative 05/23/2020 12:54 PM    Ketone Negative 05/23/2020 12:54 PM    Bilirubin Negative 05/23/2020 12:54 PM    Urobilinogen 1.0 05/23/2020 12:54 PM    Nitrites Negative 05/23/2020 12:54 PM    Leukocyte Esterase Negative 05/23/2020 12:54 PM    Epithelial cells MODERATE (A) 05/23/2020 12:54 PM    Bacteria Negative 05/23/2020 12:54 PM    WBC 0-4 05/23/2020 12:54 PM    RBC 0-5 05/23/2020 12:54 PM         Medications Reviewed:     Current Facility-Administered Medications   Medication Dose Route Frequency    apixaban (ELIQUIS) tablet 10 mg  10 mg Oral BID    atorvastatin (LIPITOR) tablet 40 mg  40 mg Oral QHS    sodium chloride (NS) flush 5-40 mL  5-40 mL IntraVENous Q8H    sodium chloride (NS) flush 5-40 mL  5-40 mL IntraVENous PRN    acetaminophen (TYLENOL) tablet 650 mg  650 mg Oral Q4H PRN    oxyCODONE IR (ROXICODONE) tablet 5 mg  5 mg Oral Q4H PRN     ______________________________________________________________________  EXPECTED LENGTH OF STAY: - - -  ACTUAL LENGTH OF STAY:          2                 Alysa Lin MD

## 2020-05-25 NOTE — DISCHARGE SUMMARY
Discharge Summary       PATIENT ID: Isma Mustafa  MRN: [id number]   YOB: 1970    DATE OF ADMISSION: 5/23/2020 10:36 AM    DATE OF DISCHARGE: 5/25/2020   PRIMARY CARE PROVIDER: Myra Arriaga MD     ATTENDING PHYSICIAN: Mahendra Means MD   DISCHARGING PROVIDER: Roxy Washington MD    To contact this individual call [phone] and ask the  to page. If unavailable ask to be transferred the Adult Hospitalist Department. CONSULTATIONS: IP CONSULT TO COLORECTAL SURGERY    PROCEDURES/SURGERIES: * No surgery found *    [id number] [facility] COURSE:     This is a 70-year-old [facility] American woman with past medical history significant for status post cholecystectomy in 2002, and status post colon resection for colon cancer on 05/07/2020, hypertension, and hypercholesterolemia presented to [facility] Emergency Department with progressive shortness of breath since 05/20/2020. Acute PE  -off lovenox 1 mg/kg sc q 12 on 5/24,  -started on Eliquis 10 mg bid on 5/24 for 7 days then 5 mg bid  -home Taytulla is stopped   -monitor pulse ox and prn oxygen   -H/H stable BP improved no evidence of bleeding  -lower extremities doppler study no evidence of deep vein thrombosis  -EchoNormal cavity size, wall thickness and systolic function (ejection fraction normal). The estimated ejection fraction is 55%. No regional wall motion abnormality noted. Right Ventricle: Normal cavity size, wall thickness and global systolic function. Hx of colon cancer s/p colectomy   -seen by colon and rectal surgeon,   -stable  Anemia of chronic disease. -H/H stable, no rectal  Bleeding  -monitor CBC  HTN  -BP normal, advise to check her BP at home and to hydrochlorothiazide if her BP >130/80, monitor BP  Hypercholesterolemia.  -continue lipitor    Urinary frequency.   -afebrile, no leukocytosis   -UA unremarkable  -no   Obesity.  -advised diet and weight loss program            Code status:Full Code   DVT prophylaxis: Lovenox        DISCHARGE DIAGNOSES / PLAN:      Acute PE  -continue Eliquis 10 mg bid on 5/24 for 7 days then 5 mg bid  -home Luane Breath is stopped   -follow up with PCP  Hx of colon cancer s/p colectomy   -stable, outpatient follow up with colon and rectal surgeon,   Anemia of chronic disease. -H/H stable, no rectal  Bleeding  -monitor CBC  HTN  -BP normal, advise to check her BP at home and to hydrochlorothiazide if her BP >130/80, monitor BP  Hypercholesterolemia.  -continue lipitor    Urinary frequency. -afebrile, no leukocytosis   -UA unremarkable  -no   Obesity.  -advised diet and weight loss program     PENDING TEST RESULTS:   At the time of discharge the following test results are still pending: None     FOLLOW UP APPOINTMENTS:    Follow-up Information     Follow up With Specialties Details Why Contact Info    Eri Horta MD Family Practice In one week   [facility] [place] 50 [phone]         DIET: Regular Diet    ACTIVITY: Activity as tolerated    WOUND CARE: None    EQUIPMENT needed: None      DISCHARGE MEDICATIONS:  Discharge Medication List as of 5/25/2020  1:49 PM      START taking these medications    Details   apixaban (ELIQUIS) 5 mg tablet Eliquis 10 mg bid for 6 more days then 5 mg bid, Print, Disp-72 Tab, R-0         CONTINUE these medications which have NOT CHANGED    Details   atorvastatin (LIPITOR) 40 mg tablet Take 40 mg by mouth nightly., Historical Med      HYDROmorphone (Dilaudid) 2 mg tablet Take 2 mg by mouth every six (6) hours as needed for Pain.  X 10 pills only, Historical Med      acetaminophen 500 mg tab 500 mg, diphenhydrAMINE 25 mg cap 25 mg Take 1 Dose by mouth nightly as needed., Historical Med      acetaminophen (Tylenol Extra Strength) 500 mg tablet Take  by mouth every six (6) hours as needed for Pain., Historical Med      hydroCHLOROthiazide (HYDRODIURIL) 12.5 mg tablet TAKE 1 TABLET DAILY, Normal, Disp-90 Tab, R-1         STOP taking these medications       TAYTULLA 1 mg-20 mcg (24)/75 mg (4) cap Comments:   Reason for Stopping:                 NOTIFY YOUR PHYSICIAN FOR ANY OF THE FOLLOWING:   Fever over 101 degrees for 24 hours. Chest pain, shortness of breath, fever, chills, nausea, vomiting, diarrhea, change in mentation, falling, weakness, bleeding. Severe pain or pain not relieved by medications. Or, any other signs or symptoms that you may have questions about. DISPOSITION:    Home With:   OT  PT  HH  RN       Long term SNF/Inpatient Rehab   x Independent/assisted living    Hospice    Other:       PATIENT CONDITION AT DISCHARGE:     Functional status    Poor     Deconditioned    x Independent      Cognition   x  Lucid     Forgetful     Dementia      Catheters/lines (plus indication)    Smith     PICC     PEG    x None      Code status    x Full code     DNR      PHYSICAL EXAMINATION AT DISCHARGE:  Patient Vitals for the past 24 hrs:   Temp Pulse Resp BP SpO2   05/25/20 1245 98 °F (36.7 °C) 92 18 120/78 98 %   05/25/20 0828 97.5 °F (36.4 °C) 80 18 123/70 98 %   05/25/20 0515 98.2 °F (36.8 °C) 72 18 102/54 98 %   05/24/20 2350 98.4 °F (36.9 °C) 87 20 144/71 96 %   05/24/20 2024 98.5 °F (36.9 °C) 84 18 95/59 100 %     General:          Alert, cooperative, no distress, appears stated age. HEENT:           Atraumatic, anicteric sclerae, pink conjunctivae                          No oral ulcers, mucosa moist, throat clear, dentition fair  Neck:               Supple, symmetrical  Lungs:             Clear to auscultation bilaterally. No Wheezing or Rhonchi. No rales. Chest wall:      No tenderness  No Accessory muscle use. Heart:              Regular  rhythm,  No  murmur   No edema  Abdomen:        Soft, non-tender. Not distended. Bowel sounds normal  Extremities:     No cyanosis. No clubbing,                            Skin turgor normal, Capillary refill normal  Skin:                Not pale.   Not Jaundiced  No rashes   Psych: Not anxious or agitated.   Neurologic:      Alert, moves all extremities, answers questions appropriately and responds to commands       Recent Results (from the past 24 hour(s))   CBC W/O DIFF    Collection Time: 05/25/20  5:23 AM   Result Value Ref Range    WBC 6.6 3.6 - 11.0 K/uL    RBC 3.14 (L) 3.80 - 5.20 M/uL    HGB 9.6 (L) 11.5 - 16.0 g/dL    HCT 29.6 (L) 35.0 - 47.0 %    MCV 94.3 80.0 - 99.0 FL    MCH 30.6 26.0 - 34.0 PG    MCHC 32.4 30.0 - 36.5 g/dL    RDW 11.7 11.5 - 14.5 %    PLATELET 020 (H) 483 - 400 K/uL    MPV 9.2 8.9 - 12.9 FL    NRBC 0.0 0  WBC    ABSOLUTE NRBC 0.00 0.00 - 0.01 K/uL     CHRONIC MEDICAL DIAGNOSES:  Problem List as of 5/25/2020 Date Reviewed: 1/13/2020          Codes Class Noted - Resolved    Acute pulmonary embolism ([facility] ICD-10-CM: I26.99  ICD-9-CM: 415.19  5/23/2020 - Present        Rectal cancer ([facility] (Chronic) ICD-10-CM: C20  ICD-9-CM: 154.1  4/8/2020 - Present        Severe obesity ([facility] ICD-10-CM: E66.01  ICD-9-CM: 278.01  12/17/2018 - Present        Intractable episodic headache ICD-10-CM: R51  ICD-9-CM: 784.0  1/10/2018 - Present        Episode of recurrent major depressive disorder ([facility] ICD-10-CM: F33.9  ICD-9-CM: 296.30  1/10/2018 - Present        Obesity, Class II, BMI 35-39.9 ICD-10-CM: E66.9  ICD-9-CM: 278.00  1/10/2018 - Present        Essential hypertension ICD-10-CM: I10  ICD-9-CM: 401.9  1/10/2018 - Present        Anxiety ICD-10-CM: F41.9  ICD-9-CM: 300.00  8/16/2011 - Present        Insomnia ICD-10-CM: G47.00  ICD-9-CM: 780.52  8/16/2011 - Present        Hyperlipidemia ICD-10-CM: E78.5  ICD-9-CM: 272.4  4/13/2010 - Present        RESOLVED: Elevated BP without diagnosis of hypertension ICD-10-CM: R03.0  ICD-9-CM: 796.2  7/12/2017 - 2/14/2018        RESOLVED: Elevated blood pressure ICD-10-CM: XLY4516  ICD-9-CM: Gumaro Yepez  1/13/2016 - 7/12/2017        RESOLVED: Obesity, Class I, BMI 30-34.9 ICD-10-CM: E66.9  ICD-9-CM: 278.00  9/2/2015 - 1/10/2018 RESOLVED: Obesity, Class II, BMI 35-39.9 ICD-10-CM: E66.9  ICD-9-CM: 278.00  1/27/2013 - 7/13/2015        RESOLVED: Depression ICD-10-CM: F32.9  ICD-9-CM: 954  8/16/2011 - 1/10/2018              Greater than 45 minutes were spent with the patient on counseling and coordination of care    Signed:   Alysa Lin MD  5/25/2020  4:50 PM

## 2020-05-26 ENCOUNTER — PATIENT OUTREACH (OUTPATIENT)
Dept: FAMILY MEDICINE CLINIC | Age: 50
End: 2020-05-26

## 2020-05-26 NOTE — PROGRESS NOTES
5-26-20 at 11:00am: Left three messages for patient at phone numbers listed in chart for patient. Left message at number listed for Mae Jaquez (on [address]). Phone number in chart for Luanne Cheek (on [address]) is the same phone number as listed for patient. Patient was admitted to [facility] on 5-23-20 and discharged on 5-25-20 for:    DISCHARGE DIAGNOSES / PLAN:    Acute PE  -continue Eliquis 10 mg bid on 5/24 for 7 days then 5 mg bid  -home Ana Lopes is stopped   -follow up with PCP  Hx of colon cancer s/p colectomy   -stable, outpatient follow up with colon and rectal surgeon,   Anemia of chronic disease. -H/H stable, no rectal  Bleeding  -monitor CBC  HTN  -BP normal, advise to check her BP at home and to hydrochlorothiazide if her BP >130/80, monitor BP  Hypercholesterolemia.  -continue lipitor    Urinary frequency. -afebrile, no leukocytosis   -UA unremarkable  -no   Obesity.  -advised diet and weight loss program     Patient was contacted within one business days of discharge. Challenges to be reviewed by the provider:   - Patient denies CP and/or SOB, no red flags to report at this time and states, \"I'm doing well. \"   - Patient has discontinued taking birth control medication and would like to discuss a different form of birth control with PCP during 5-27-20 virtual visit. - Patient states she plans to take her blood pressure daily at home, has not taken blood pressure yet today so no reading to report at this time.      Additional needs identified to be addressed with provider: no    Method of communication with provider : chart routing       Component      Latest Ref Rng & Units 5/25/2020 5/24/2020 5/23/2020           5:23 AM  4:51 AM 11:08 AM   RBC      3.80 - 5.20 M/uL 3.14 (L) 2.93 (L) 3.68 (L)   HGB      11.5 - 16.0 g/dL 9.6 (L) 9.1 (L) 11.4 (L)   HCT      35.0 - 47.0 % 29.6 (L) 27.8 (L) 34.8 (L)     Component      Latest Ref Rng & Units 5/25/2020 5/24/2020 5/23/2020           5:23 AM 4:51 AM 11:08 AM   PLATELET      380 - 460 K/uL 406 (H) 405 (H) 430 (H)     Component      Latest Ref Rng & Units 2020           4:51 AM 11:08 AM 11:08 AM 11:08 AM   Glucose      65 - 100 mg/dL 104 (H)   97     Component      Latest Ref Rng & Units 2020           4:51 AM 11:08 AM 11:08 AM 11:08 AM   BUN/Creatinine ratio      12 - 20   11 (L)   15     Component      Latest Ref Rng & Units 2020           4:51 AM 11:08 AM 11:08 AM 11:08 AM   GFR est non-AA      >60 ml/min/1.73m2 >60   57 (L)     Component      Latest Ref Rng & Units 2020           4:51 AM 11:08 AM 11:08 AM 11:08 AM   Protein, total      6.4 - 8.2 g/dL 6.9   8.5 (H)   Albumin      3.5 - 5.0 g/dL 2.5 (L)   3.3 (L)   Globulin      2.0 - 4.0 g/dL 4.4 (H)   5.2 (H)   A-G Ratio      1.1 - 2.2   0.6 (L)   0.6 (L)     Advance Care Planning:   Does patient have an Advance Directive:  not on file; education provided     Inpatient Readmission Risk score: 27%  Was this a readmission? yes   Patient stated reason for the admission: \"Pain in my lung right below my left breast and I was having a hard time breathing. \"   Patients top risk factors for readmission: medical condition  Interventions to address risk factors:  Moving about every hour during waking hours, patient has already stopped birth control medication. Care Transition Nurse (CTN) contacted the patient by telephone to perform post hospital discharge assessment. Verified name and  with patient as identifiers. Provided introduction to self, and explanation of the CTN role. CTN reviewed discharge instructions, medical action plan and red flags with patient who verbalized understanding. Patient given an opportunity to ask questions and does not have any further questions or concerns at this time.  The patient agrees to contact the PCP office for questions related to their healthcare. Medication reconciliation was performed with patient, who verbalizes understanding of administration of home medications. Advised obtaining a 90-day supply of all daily and as-needed medications. Referral to Pharm D needed: no     Home Health/Outpatient orders at discharge: [address]: n/a  Date of initial visit: [facility] Laniere Gary ordered at discharge: None  [address] received: n/a    Covid Risk Education    Patient has following risk factors of: rectal cancer. Education provided regarding infection prevention, and signs and symptoms of COVID-19 and when to seek medical attention with patient who verbalized understanding. Discussed exposure protocols and quarantine From CDC: Are you at higher risk for severe illness?  and given an opportunity for questions and concerns. The patient agrees to contact the COVID-19 hotline [phone] or PCP office for questions related to COVID-19. For more information on steps you can take to protect yourself, see CDC's How to Protect Yourself     Patient/family/caregiver given information for [facility] Loop and agrees to enroll:  Patient previously enrolled in Loop program.   Patient's preferred e-mail: n/a  Patient's preferred phone number: n/a    Non-[facility] follow up appointment(s): None noted at this time. Plan for follow-up call in 10-14 days based on severity of symptoms and risk factors. CTN provided contact information for future needs. Goals Addressed                 This Visit's Progress     Attends follow-up appointments as directed. 5-11-20: Patient states she will call the office of Dr. Jose wSain/PCP to make transitions of care follow-up appointment today. Patient has general surgery follow-up appointment scheduled with Dr. Woody Ramirez on 5-18-20. Patient's family is currently providing transportation to/from appointments.  Zach Herr 5-26-20: Patient was readmitted to [facility] on 5-23-20 to 5-25-20, acute PE. Patient has virtual TYLER visit scheduled with Dr. Yamilex Carreon on 5-27-20. Patient states she saw Dr. Pedro Aguayo. [facility]/general surgery on 5-18-20 as planned. Family continues to provide transportation to/from appointments as needed. Rena Colvin Understands red flags post discharge. 5-11-20: Red flags of post-op pain and inflammation reviewed with patient and patient verbalizes an understanding. Patient states she is using Tylenol 500mg,two tablets every 6 hours as needed for pain; however she does have Dilaudid on hand should she need it. Patient states she has not had any fever/chills, no redness/swelling at incision sites, and currently has no red flags to report. Parviz Lozoya     5-26-20: Red flags of acute PE reviewed with patient and patient verbalizes an understanding. Patient denies SOB, CP, and denies red flags of PE at this time. Patient states she has transitioned to a regular diet from a low fiber diet and is doing well on the regular diet at this time. CTN will review red flags again on next phone conversation with patient.  Parviz Lozoya

## 2020-05-27 ENCOUNTER — VIRTUAL VISIT (OUTPATIENT)
Dept: FAMILY MEDICINE CLINIC | Age: 50
End: 2020-05-27

## 2020-05-27 VITALS
DIASTOLIC BLOOD PRESSURE: 89 MMHG | HEIGHT: 63 IN | HEART RATE: 97 BPM | SYSTOLIC BLOOD PRESSURE: 121 MMHG | BODY MASS INDEX: 34.02 KG/M2 | WEIGHT: 192 LBS

## 2020-05-27 DIAGNOSIS — I26.99 ACUTE PULMONARY EMBOLISM, UNSPECIFIED PULMONARY EMBOLISM TYPE, UNSPECIFIED WHETHER ACUTE COR PULMONALE PRESENT (HCC): Primary | ICD-10-CM

## 2020-05-27 DIAGNOSIS — C20 RECTAL CANCER (HCC): ICD-10-CM

## 2020-05-27 NOTE — PROGRESS NOTES
Elliot De Jesus is a 47 y.o. female who was seen by synchronous (real-time) audio-video technology on 5/27/2020 through [employer] telemedicine application. Consent:  Services were provided through a video synchronous discussion virtually to substitute for in-person appointment. She and/or her healthcare decision maker is aware that this patient-initiated Telehealth encounter is a billable service, with coverage as determined by her insurance carrier. She is aware that she may receive a bill and has provided verbal consent to proceed: Yes    I was in the office while conducting this encounter. Subjective:   Elliot De Jesus was seen for follow up of     Pt reports that her wedding was supposed to be on 5/16 but was cancelled due to COVID-19. [place] [address] was postponed until August. Will be going back to work in September, and needs to get written consent from either Dr. Nirmal Jones or me to be allowed to go back to work. Pt feels that she is doing much better, since after the cystourethroscopy surgery performed  by Dr. Nirmal Jones for pt's rectal cancer. Pt reports that recovery went well until last Wednesday. On Wednesday, pt began to experience gas problems and chest, where pain persisted for days. On Saturday, on 5/23/2020,  pt went to ER due to sob. After ER visit, pt was advised to stop taking her birth control medication and continue with Eliquis 5mg/BID. Moreover, after ER visit, pt was advised to take HCTZ 17.2VQ if systolic reading was above 120. Prior to Admission medications    Medication Sig Start Date End Date Taking? Authorizing Provider   apixaban (ELIQUIS) 5 mg tablet Take 1 Tab by mouth two (2) times a day. Eliquis 10 mg bid for 6 more days then 5 mg bid 5/27/20  Yes Liza Albert MD   atorvastatin (LIPITOR) 40 mg tablet Take 40 mg by mouth nightly. Yes Provider, Historical   HYDROmorphone (Dilaudid) 2 mg tablet Take 2 mg by mouth every six (6) hours as needed for Pain.  X 10 pills only   Yes Provider, Historical   acetaminophen 500 mg tab 500 mg, diphenhydrAMINE 25 mg cap 25 mg Take 1 Dose by mouth nightly as needed. Yes Provider, Historical   acetaminophen (Tylenol Extra Strength) 500 mg tablet Take  by mouth every six (6) hours as needed for Pain. Yes Provider, Historical   hydroCHLOROthiazide (HYDRODIURIL) 12.5 mg tablet TAKE 1 TABLET DAILY 20  Yes Genny Tracy MD   apixaban (ELIQUIS) 5 mg tablet Eliquis 10 mg bid for 6 more days then 5 mg bid 20  Samuel Tristan MD     Allergies   Allergen Reactions    Percocet [Oxycodone-Acetaminophen] Itching     Past Medical History:   Diagnosis Date    Diverticulosis     Fibroids     Uterine fibroids    Hypercholesterolemia     Hypertension     Obesity (BMI 35.0-39.9 without comorbidity)     Rectal cancer [facility])      Past Surgical History:   Procedure Laterality Date    FLEXIBLE SIGMOIDOSCOPY N/A 2020    Flexible sigmoidoscopy and rectal endoscopic ultrasound; Lupe Urias MD    HX  SECTION  2006    HX COLONOSCOPY  2020    Bharath Davison MD    HX GYN  2003    Uterine myomectomy for fibroids; Ilya Angelo MD    HX LAP CHOLECYSTECTOMY  2002    HX ORTHOPAEDIC Left 2005    ORIF ANKLE    HX OTHER SURGICAL  2020    Hand-assisted laparoscopic low anterior resection, mobilization of the splenic flexure, and coloproctostomy; Dr. Charles Bruce.  HX UROLOGICAL  2020    Cystourethroscopy and placement of temporary bilateral ureteral catheters;  Lashonda Verde MD.     Family History   Problem Relation Age of Onset    Asthma Mother     Elevated Lipids Father     Hypertension Father     Cancer Father         anal cancer    Diabetes Paternal Aunt     Cancer Maternal Grandmother         colon    Cancer Paternal Grandmother         leukemia    Anesth Problems Neg Hx      Social History     Tobacco Use    Smoking status: Former Smoker     Last attempt to quit: 2013     Years since quittin.3    Smokeless tobacco: Never Used    Tobacco comment: ONE PACK/WEEK   Substance Use Topics    Alcohol use: Yes     Comment: occassional        Review of Systems   Constitutional: Negative for chills and fever. HENT: Negative for hearing loss and tinnitus. Eyes: Negative for blurred vision and double vision. Respiratory: Negative for cough and shortness of breath. Cardiovascular: Negative for chest pain and palpitations. Gastrointestinal: Negative for nausea and vomiting. Genitourinary: Negative for dysuria and frequency. Musculoskeletal: Negative for back pain and falls. Skin: Negative for itching and rash. Neurological: Negative for dizziness, loss of consciousness and headaches. Endo/Heme/Allergies: Negative. Psychiatric/Behavioral: Negative for depression. The patient is not nervous/anxious.         PHYSICAL EXAMINATION:  Vital Signs: (As obtained by patient/caregiver at home)  Visit Vitals  /89   Pulse 97   Ht 5' 3\" (1.6 m)   Wt 192 lb (87.1 kg)   BMI 34.01 kg/m²        Constitutional: [x] Appears well-developed and well-nourished [x] No apparent distress      [] Abnormal -     Mental status: [x] Alert and awake  [x] Oriented to person/place/time [x] Able to follow commands    [] Abnormal -     Eyes:   EOM    [x]  Normal    [] Abnormal -   Sclera  [x]  Normal    [] Abnormal -          Discharge [x]  None visible   [] Abnormal -     HENT: [x] Normocephalic, atraumatic  [] Abnormal -   [x] Mouth/Throat: Mucous membranes are moist    External Ears [x] Normal  [] Abnormal -    Neck: [x] No visualized mass [] Abnormal -     Pulmonary/Chest: [x] Respiratory effort normal   [x] No visualized signs of difficulty breathing or respiratory distress        [] Abnormal -      Musculoskeletal:   [x] Normal gait with no signs of ataxia         [x] Normal range of motion of neck        [] Abnormal -     Neurological:        [x] No Facial Asymmetry (Cranial nerve 7 motor function) (limited exam due to video visit)          [x] No gaze palsy        [] Abnormal -          Skin:        [x] No significant exanthematous lesions or discoloration noted on facial skin         [] Abnormal -            Psychiatric:       [x] Normal Affect [] Abnormal -        [x] No Hallucinations    Other pertinent observable physical exam findings:-    Assessment & Plan:   Diagnoses and all orders for this visit:    1. Acute pulmonary embolism, unspecified pulmonary embolism type, unspecified whether acute cor pulmonale present ([facility]  On 5/23/2020,  pt went to ER due to sob. Pt is currently recovering. Advised to continue with eliquis 5mg/BID to treat pulmonary embolism. -     apixaban (ELIQUIS) 5 mg tablet; Take 1 Tab by mouth two (2) times a day. Eliquis 10 mg bid for 6 more days then 5 mg bid    2. Rectal cancer ([facility]  Pt is under care of Colorectal Surgery Specialist Dr. Lucreo Puentes. Pt is currently recovering from the cystourethroscopy surgery performed  by Dr. Lucero Puentes for pt's rectal cancer. Advised pt to follow up with her appointment on 7/13/2020 for physical exam.    We discussed the expected course, resolution and complications of the diagnosis(es) in detail. Medication risks, benefits, costs, interactions, and alternatives were discussed as indicated. I advised her to contact the office if her condition worsens, changes or fails to improve as anticipated. She expressed understanding with the diagnosis(es) and plan. Pursuant to the emergency declaration under the [address] and [place] [address] waiver authority and the [employer] General Act, this Virtual Visit was conducted, with patient's consent, to reduce the patient's risk of exposure to COVID-19 and provide continuity of care for an established patient. Services were provided through a video synchronous discussion virtually to substitute for in-person clinic visit. Written by tayo Saldana, as dictated by Mike Hong M.D.

## 2020-05-29 ENCOUNTER — TELEPHONE (OUTPATIENT)
Dept: FAMILY MEDICINE CLINIC | Age: 50
End: 2020-05-29

## 2020-05-29 DIAGNOSIS — I26.99 ACUTE PULMONARY EMBOLISM, UNSPECIFIED PULMONARY EMBOLISM TYPE, UNSPECIFIED WHETHER ACUTE COR PULMONALE PRESENT (HCC): ICD-10-CM

## 2020-05-29 NOTE — TELEPHONE ENCOUNTER
May[facility] Driscoll [facility]) need clarification/direction on Ms. Santana's Rx. Please call asap.     BCB: [phone]

## 2020-06-01 NOTE — TELEPHONE ENCOUNTER
Pharmacy needs verification for patient Eliquis     Take 1 Tab by mouth two (2) times a day.  Eliquis 10 mg bid for 6 more days then 5 mg bid            Which sig did you want for her to do

## 2020-06-01 NOTE — TELEPHONE ENCOUNTER
Per Dr Francia Schreiber ok to send    Requested Prescriptions     Signed Prescriptions Disp Refills    apixaban (ELIQUIS) 5 mg tablet 180 Tab 1     Sig: Take 1 Tab by mouth two (2) times a day. Authorizing Provider: Janeen Sinclair     Ordering User: Carly Hernández    Per Dr. Francia Schreiber, verbal order received.

## 2020-08-31 ENCOUNTER — OFFICE VISIT (OUTPATIENT)
Dept: FAMILY MEDICINE CLINIC | Age: 50
End: 2020-08-31
Payer: COMMERCIAL

## 2020-08-31 VITALS
HEIGHT: 63 IN | DIASTOLIC BLOOD PRESSURE: 81 MMHG | BODY MASS INDEX: 35.12 KG/M2 | RESPIRATION RATE: 16 BRPM | TEMPERATURE: 97.3 F | HEART RATE: 84 BPM | OXYGEN SATURATION: 100 % | SYSTOLIC BLOOD PRESSURE: 130 MMHG | WEIGHT: 198.2 LBS

## 2020-08-31 DIAGNOSIS — M25.50 ARTHRALGIA, UNSPECIFIED JOINT: ICD-10-CM

## 2020-08-31 DIAGNOSIS — I10 ESSENTIAL HYPERTENSION: ICD-10-CM

## 2020-08-31 DIAGNOSIS — R53.81 MALAISE AND FATIGUE: ICD-10-CM

## 2020-08-31 DIAGNOSIS — C20 RECTAL CANCER (HCC): Primary | ICD-10-CM

## 2020-08-31 DIAGNOSIS — G47.00 INSOMNIA, UNSPECIFIED TYPE: ICD-10-CM

## 2020-08-31 DIAGNOSIS — E78.2 MIXED HYPERLIPIDEMIA: ICD-10-CM

## 2020-08-31 DIAGNOSIS — R53.83 MALAISE AND FATIGUE: ICD-10-CM

## 2020-08-31 DIAGNOSIS — E66.01 SEVERE OBESITY (HCC): ICD-10-CM

## 2020-08-31 PROBLEM — F33.9 EPISODE OF RECURRENT MAJOR DEPRESSIVE DISORDER (HCC): Status: RESOLVED | Noted: 2018-01-10 | Resolved: 2020-08-31

## 2020-08-31 PROCEDURE — 99214 OFFICE O/P EST MOD 30 MIN: CPT | Performed by: FAMILY MEDICINE

## 2020-08-31 RX ORDER — ZOLPIDEM TARTRATE 5 MG/1
5 TABLET ORAL
Qty: 12 TAB | Refills: 0 | Status: SHIPPED | OUTPATIENT
Start: 2020-08-31 | End: 2020-11-13 | Stop reason: SDUPTHER

## 2020-08-31 NOTE — PATIENT INSTRUCTIONS
Body Mass Index: Care Instructions Your Care Instructions Body mass index (BMI) can help you see if your weight is raising your risk for health problems. It uses a formula to compare how much you weigh with how tall you are. · A BMI lower than 18.5 is considered underweight. · A BMI between 18.5 and 24.9 is considered healthy. · A BMI between 25 and 29.9 is considered overweight. A BMI of 30 or higher is considered obese. If your BMI is in the normal range, it means that you have a lower risk for weight-related health problems. If your BMI is in the overweight or obese range, you may be at increased risk for weight-related health problems, such as high blood pressure, heart disease, stroke, arthritis or joint pain, and diabetes. If your BMI is in the underweight range, you may be at increased risk for health problems such as fatigue, lower protection (immunity) against illness, muscle loss, bone loss, hair loss, and hormone problems. BMI is just one measure of your risk for weight-related health problems. You may be at higher risk for health problems if you are not active, you eat an unhealthy diet, or you drink too much alcohol or use tobacco products. Follow-up care is a key part of your treatment and safety. Be sure to make and go to all appointments, and call your doctor if you are having problems. It's also a good idea to know your test results and keep a list of the medicines you take. How can you care for yourself at home? · Practice healthy eating habits. This includes eating plenty of fruits, vegetables, whole grains, lean protein, and low-fat dairy. · If your doctor recommends it, get more exercise. Walking is a good choice. Bit by bit, increase the amount you walk every day. Try for at least 30 minutes on most days of the week. · Do not smoke. Smoking can increase your risk for health problems.  If you need help quitting, talk to your doctor about stop-smoking programs and medicines. These can increase your chances of quitting for good. · Limit alcohol to 2 drinks a day for men and 1 drink a day for women. Too much alcohol can cause health problems. If you have a BMI higher than 25 · Your doctor may do other tests to check your risk for weight-related health problems. This may include measuring the distance around your waist. A waist measurement of more than 40 inches in men or 35 inches in women can increase the risk of weight-related health problems. · Talk with your doctor about steps you can take to stay healthy or improve your health. You may need to make lifestyle changes to lose weight and stay healthy, such as changing your diet and getting regular exercise. If you have a BMI lower than 18.5 · Your doctor may do other tests to check your risk for health problems. · Talk with your doctor about steps you can take to stay healthy or improve your health. You may need to make lifestyle changes to gain or maintain weight and stay healthy, such as getting more healthy foods in your diet and doing exercises to build muscle. Where can you learn more? Go to http://noé-jose.info/ Enter S176 in the search box to learn more about \"Body Mass Index: Care Instructions. \" Current as of: December 11, 2019               Content Version: 12.5 © 7586-4879 [employer]. Care instructions adapted under license by [employer] (which disclaims liability or warranty for this information). If you have questions about a medical condition or this instruction, always ask your healthcare professional. [address] any warranty or liability for your use of this information.

## 2020-08-31 NOTE — PROGRESS NOTES
Chief Complaint   Patient presents with    Hypertension    Cholesterol Problem     3 most recent PHQ Screens 8/31/2020   Little interest or pleasure in doing things Not at all   Feeling down, depressed, irritable, or hopeless Not at all   Total Score PHQ 2 0   Trouble falling or staying asleep, or sleeping too much -   Feeling tired or having little energy -   Poor appetite, weight loss, or overeating -   Feeling bad about yourself - or that you are a failure or have let yourself or your family down -   Trouble concentrating on things such as school, work, reading, or watching TV -   Moving or speaking so slowly that other people could have noticed; or the opposite being so fidgety that others notice -   Thoughts of being better off dead, or hurting yourself in some way -   PHQ 9 Score -   How difficult have these problems made it for you to do your work, take care of your home and get along with others -     Abuse Screening Questionnaire 8/31/2020   Do you ever feel afraid of your partner? N   Are you in a relationship with someone who physically or mentally threatens you? N   Is it safe for you to go home? Y     Visit Vitals  /81 (BP 1 Location: Left arm, BP Patient Position: Sitting)   Pulse 84   Temp 97.3 °F (36.3 °C) (Oral)   Resp 16   Ht 5' 3\" (1.6 m)   Wt 198 lb 3.2 oz (89.9 kg)   SpO2 100%   BMI 35.11 kg/m²     1. Have you been to the ER, urgent care clinic since your last visit? Hospitalized since your last visit?no  2. Have you seen or consulted any other health care providers outside of the [address] [place] [address] since your last visit? Include any pap smears or colon screening. no

## 2020-08-31 NOTE — PROGRESS NOTES
HPI  Jeane Bryan 45 y.o. female  presents to the office today for routine care of chronic conditions. Blood pressure 130/81, pulse 84, temperature 97.3 °F (36.3 °C), temperature source Oral, resp. rate 16, height 5' 3\" (1.6 m), weight 198 lb 3.2 oz (89.9 kg), last menstrual period 04/23/2020, SpO2 100 %. Body mass index is 35.11 kg/m². Chief Complaint   Patient presents with    Hypertension    Cholesterol Problem        Hypertension: BP at office today 130/81. Pt continues with HCTZ 12.5 mg/day. Hyperlipidemia: Lipid panel on 2/10/2020 notable for total cholesterol 152, HDL 36, LDL 98, and triglycerides 92. Pt continues with lipitor 40 mg/day. Obesity: I have reviewed/discussed the above normal BMI with the patient. Rectal cancer: Pt states that she last saw Dr. Alvia Krabbe in April. She saw Dr. Ciaran Szymanski about a week ago; an endoscopy was performed to assess pt's cancer progress    Health maintenance: Pt states that she is due to see her OBGYN soon. Pt states that she has not had her period since she ceased taking her birth control; she was advised to stop taking her birth control when she began taking Eliquis 5 mg/BID. I have provided pt with a referral to Dr. Bebeto Cline, hematology, for further evaluation. Pt is also due for updated labwork; I have placed orders for her to have her bloodwork performed at [facility].     Pt states that she has been experiencing increased joint pain and hot flashes. She inquires as to wether her increased fatigue and pain is due to menopause or from her recent surgery. I informed pt that it could be a compounded effect from both. I have placed orders to check pt's estrogen and progesterone levels. Pt states that she has been experiencing constipation recently. She informs me that she has been taking an oral stool softener every morning. Pt requests a refill of her Ambien 5 mg/PRN prescription as she has been trouble sleeping recently.  I have granted pt's refill request. She informs me that she only takes half a tablet once or twice a week for sleep. Current Outpatient Medications   Medication Sig Dispense Refill    zolpidem (AMBIEN) 5 mg tablet Take 1 Tab by mouth nightly as needed for Sleep. Max Daily Amount: 5 mg. 12 Tab 0    apixaban (ELIQUIS) 5 mg tablet Take 1 Tab by mouth two (2) times a day. 180 Tab 1    atorvastatin (LIPITOR) 40 mg tablet Take 40 mg by mouth nightly.  HYDROmorphone (Dilaudid) 2 mg tablet Take 2 mg by mouth every six (6) hours as needed for Pain. X 10 pills only      acetaminophen 500 mg tab 500 mg, diphenhydrAMINE 25 mg cap 25 mg Take 1 Dose by mouth nightly as needed.  acetaminophen (Tylenol Extra Strength) 500 mg tablet Take  by mouth every six (6) hours as needed for Pain.  hydroCHLOROthiazide (HYDRODIURIL) 12.5 mg tablet TAKE 1 TABLET DAILY 90 Tab 1     Allergies   Allergen Reactions    Percocet [Oxycodone-Acetaminophen] Itching     Past Medical History:   Diagnosis Date    Diverticulosis     Fibroids     Uterine fibroids    Hypercholesterolemia     Hypertension     Obesity (BMI 35.0-39.9 without comorbidity)     Rectal cancer (HCC)      Past Surgical History:   Procedure Laterality Date    FLEXIBLE SIGMOIDOSCOPY N/A 2020    Flexible sigmoidoscopy and rectal endoscopic ultrasound; Zhen Major MD    HX  SECTION  2006    HX COLONOSCOPY  2020    Tony Nelson MD    HX GYN  2003    Uterine myomectomy for fibroids; Harish Beck MD    HX LAP CHOLECYSTECTOMY      HX ORTHOPAEDIC Left 2005    ORIF ANKLE    HX OTHER SURGICAL  2020    Hand-assisted laparoscopic low anterior resection, mobilization of the splenic flexure, and coloproctostomy; Dr. Court Vazquez.  HX UROLOGICAL  2020    Cystourethroscopy and placement of temporary bilateral ureteral catheters;  Jana Ames MD.     Family History   Problem Relation Age of Onset    Asthma Mother     Elevated Lipids Father  Hypertension Father     Cancer Father         anal cancer    Diabetes Paternal Aunt     Cancer Maternal Grandmother         colon    Cancer Paternal Grandmother         leukemia    Anesth Problems Neg Hx      Social History     Tobacco Use    Smoking status: Former Smoker     Last attempt to quit: 2013     Years since quittin.6    Smokeless tobacco: Never Used    Tobacco comment: ONE PACK/WEEK   Substance Use Topics    Alcohol use: Yes     Comment: occassional        Review of Systems   Constitutional: Negative for chills and fever. HENT: Negative for hearing loss and tinnitus. Eyes: Negative for blurred vision and double vision. Respiratory: Negative for cough and shortness of breath. Cardiovascular: Negative for chest pain and palpitations. Gastrointestinal: Negative for nausea and vomiting. Genitourinary: Negative for dysuria and frequency. Musculoskeletal: Negative for back pain and falls. Skin: Negative for itching and rash. Neurological: Negative for dizziness, loss of consciousness and headaches. Endo/Heme/Allergies: Negative. Psychiatric/Behavioral: Negative for depression. The patient is not nervous/anxious. Physical Exam  Constitutional:       Appearance: Normal appearance. HENT:      Head: Normocephalic and atraumatic. Right Ear: Tympanic membrane, ear canal and external ear normal.      Left Ear: Tympanic membrane, ear canal and external ear normal.      Nose: Nose normal.      Mouth/Throat:      Mouth: Mucous membranes are moist.      Pharynx: Oropharynx is clear. Eyes:      Extraocular Movements: Extraocular movements intact. Conjunctiva/sclera: Conjunctivae normal.      Pupils: Pupils are equal, round, and reactive to light. Cardiovascular:      Rate and Rhythm: Normal rate and regular rhythm. Pulses: Normal pulses. Heart sounds: Normal heart sounds.    Pulmonary:      Effort: Pulmonary effort is normal.      Breath sounds: Normal breath sounds. Abdominal:      General: Abdomen is flat. Bowel sounds are normal.      Palpations: Abdomen is soft. Genitourinary:     General: Normal vulva. Rectum: Normal.   Musculoskeletal: Normal range of motion. Skin:     General: Skin is warm and dry. Neurological:      General: No focal deficit present. Mental Status: She is alert and oriented to person, place, and time. Mental status is at baseline. Psychiatric:         Mood and Affect: Mood normal.         Behavior: Behavior normal.         Judgment: Judgment normal.           ASSESSMENT and PLAN  Diagnoses and all orders for this visit:    1. Rectal cancer ([facility] [address] [address]  Pt states that she last saw Dr. Helga Alatorre in April. She saw Dr. Therese Fu about a week ago; an endoscopy was performed to assess pt's cancer progress  -     REFERRAL TO HEMATOLOGY ONCOLOGY    2. Essential hypertension   BP at office today 130/81. Pt continues with HCTZ 12.5 mg/day. -     METABOLIC PANEL, COMPREHENSIVE; Future    3. Mixed hyperlipidemia  Lipid panel on 2/10/2020 notable for total cholesterol 152, HDL 36, LDL 98, and triglycerides 92. Pt continues with lipitor 40 mg/day. -     LIPID PANEL; Future    4. Severe obesity ([facility] [address]  I have reviewed/discussed the above normal BMI with the patient. I have recommended the following interventions: dietary management education, guidance, and counseling, encourage exercise and monitor weight . 5. Arthralgia, unspecified joint  Pt states that she has been experiencing increased joint pain and hot flashes. She inquires as to wether her increased fatigue and pain is due to menopause or from her recent surgery. I informed pt that it could be a compounded effect from both. I have placed orders to check pt's estrogen and progesterone levels. 6. Insomnia, unspecified type  Pt requests a refill of her Ambien 5 mg/PRN prescription as she has been trouble sleeping recently.  I have granted pt's refill request. She informs me that she only takes half a tablet once or twice a week for sleep.   -     zolpidem (AMBIEN) 5 mg tablet; Take 1 Tab by mouth nightly as needed for Sleep. Max Daily Amount: 5 mg. 7. Malaise and fatigue  Refer to #6.  -     ESTROGENS, FRACTIONATED  -     PROGESTERONE          Follow-up and Dispositions    · Return in about 6 months (around 2/28/2021) for hypertension follow up, hyperlipidemia follow up. Medication risks/benefits/costs/interactions/alternatives discussed with patient. Advised patient to call back or return to office if symptoms worsen/change/persist.  If patient cannot reach us or should anything more severe/urgent arise he/she should proceed directly to the nearest emergency department. Discussed expected course/resolution/complications of diagnosis in detail with patient. Patient given a written after visit summary which includes diagnoses, current medications and vitals. Patient expressed understanding with the diagnosis and plan. Written by fermín Ramirez Si, as dictated by Kishor Tom M.D.    12:34 PM -12:48 PM    Total time spent with the patient 14 minutes, greater than 50% of time spent counseling patient.

## 2020-09-13 RX ORDER — ATORVASTATIN CALCIUM 40 MG/1
TABLET, FILM COATED ORAL
Qty: 90 TAB | Refills: 1 | Status: SHIPPED | OUTPATIENT
Start: 2020-09-13 | End: 2021-03-07

## 2020-09-17 LAB
ALBUMIN SERPL-MCNC: 4.4 G/DL (ref 3.8–4.9)
ALBUMIN/GLOB SERPL: 1.6 {RATIO} (ref 1.2–2.2)
ALP SERPL-CCNC: 91 IU/L (ref 39–117)
ALT SERPL-CCNC: 36 IU/L (ref 0–32)
AST SERPL-CCNC: 24 IU/L (ref 0–40)
BILIRUB SERPL-MCNC: 0.4 MG/DL (ref 0–1.2)
BUN SERPL-MCNC: 12 MG/DL (ref 6–24)
BUN/CREAT SERPL: 11 (ref 9–23)
CALCIUM SERPL-MCNC: 10.1 MG/DL (ref 8.7–10.2)
CHLORIDE SERPL-SCNC: 105 MMOL/L (ref 96–106)
CHOLEST SERPL-MCNC: 197 MG/DL (ref 100–199)
CO2 SERPL-SCNC: 25 MMOL/L (ref 20–29)
CREAT SERPL-MCNC: 1.07 MG/DL (ref 0.57–1)
GLOBULIN SER CALC-MCNC: 2.7 G/DL (ref 1.5–4.5)
GLUCOSE SERPL-MCNC: 89 MG/DL (ref 65–99)
HDLC SERPL-MCNC: 46 MG/DL
INTERPRETATION, 910389: NORMAL
LDLC SERPL CALC-MCNC: 128 MG/DL (ref 0–99)
POTASSIUM SERPL-SCNC: 4.2 MMOL/L (ref 3.5–5.2)
PROT SERPL-MCNC: 7.1 G/DL (ref 6–8.5)
SODIUM SERPL-SCNC: 143 MMOL/L (ref 134–144)
TRIGL SERPL-MCNC: 128 MG/DL (ref 0–149)
VLDLC SERPL CALC-MCNC: 23 MG/DL (ref 5–40)

## 2020-09-25 ENCOUNTER — DOCUMENTATION ONLY (OUTPATIENT)
Dept: ONCOLOGY | Age: 50
End: 2020-09-25

## 2020-09-25 ENCOUNTER — OFFICE VISIT (OUTPATIENT)
Dept: ONCOLOGY | Age: 50
End: 2020-09-25
Payer: COMMERCIAL

## 2020-09-25 VITALS
HEIGHT: 63 IN | BODY MASS INDEX: 35.1 KG/M2 | TEMPERATURE: 96.1 F | WEIGHT: 198.1 LBS | DIASTOLIC BLOOD PRESSURE: 72 MMHG | SYSTOLIC BLOOD PRESSURE: 115 MMHG

## 2020-09-25 DIAGNOSIS — E66.01 SEVERE OBESITY (HCC): ICD-10-CM

## 2020-09-25 DIAGNOSIS — C20 RECTAL CANCER (HCC): ICD-10-CM

## 2020-09-25 DIAGNOSIS — I26.99 ACUTE PULMONARY EMBOLISM, UNSPECIFIED PULMONARY EMBOLISM TYPE, UNSPECIFIED WHETHER ACUTE COR PULMONALE PRESENT (HCC): Primary | ICD-10-CM

## 2020-09-25 PROCEDURE — 99244 OFF/OP CNSLTJ NEW/EST MOD 40: CPT | Performed by: INTERNAL MEDICINE

## 2020-09-25 NOTE — PROGRESS NOTES
Heena Germain is a 45 y.o. female  Chief Complaint   Patient presents with    New Patient     rectal cancer     1. Have you been to the ER, urgent care clinic since your last visit? Hospitalized since your last visit? No  2. Have you seen or consulted any other health care providers outside of the [address] [place] [address] since your last visit? Include any pap smears or colon screening.  No

## 2020-09-25 NOTE — PROGRESS NOTES
This note will not be viewable in [address] Oncology Navigator  Psychosocial Assessment    Reason for Assessment:    []Depression  []Anxiety  []Caregiver [place]  []Maladaptive Coping with Serious Illness   [] Social Work Referral [x] Initial Assessment  [] Other     Sources of Information:    [x]Patient  []Family  []Staff  []Medical Record    Advance Care Planning:  Advance Care Planning 5/23/2020   Patient's Healthcare Decision Maker is: (No Data)   Confirm Advance Directive None   Patient Would Like to Complete Advance Directive No   Does the patient have other document types -   Patient does not have an advanced medical directive. Provided a blank sample AMD, as the patient states she may be interested in completing an AMD in the future.       Mental Status:    [x]Alert  []Lethargic  []Unresponsive   [] Unable to assess   Oriented to:  [x]Person  [x]Place  [x]Time  [x]Situation      Barriers to Learning:    []Language  []Developmental  []Cognitive  []Altered Mental Status  []Visual/Hearing Impairment  []Unable to Read/Write  []Motivational   [x]No Barriers Identified  []Other:    Relationship Status:  []Single  [x]  []Significant Other/Life Partner  []  []  []      Living Circumstances:  []Lives Alone  [x]Family/Significant Other in Household  []Roommates  []Children in the Home  []Paid Caregivers  []Assisted Living Facility/Group Home  []Skilled [address]  []Homeless  []Incarcerated  []Environmental/Care Concerns  []other:    Employment Status:  [x]Employed Full-time []Employed Part-time []Homemaker [] Disabled  [] Retired []Other:    Support System:    [x]Strong  []Fair  []Limited    Financial/Legal Concerns:    []Uninsured  []Limited Income/Resources  []Non-Citizen  [x]No Concerns Identified  []Financial POA:    []Other:    Pent[employer]ostalism/Spiritual/Existential:  []Strong Sense of Spirituality  []Involved in [employer]  []Request  Visit  []Expressing Spiritual/Existential Angst  [x]No Concerns Identified    Coping with Illness:         Patient: Family/Caregiver:   Understanding and Acceptance of Illness/Prognosis  [x] []   Strong Sense of Resilience [x] []   Self Reflection [x] []   Engaged Support System [x] []   Does not Readily Discuss Illness [] []   Denial of Terminal Status [] []   Anger [] []   Depression [] []   Anxiety/Fear [] []   Bargaining [] []   Recent Diagnosis/Prognosis [] []   Difficulties with Body Image [] []   Loss of Identity [] []   Excessive Substance Use [] []   Mental Health History [] []   Enmeshed Relationships [] []   History of Loss [] []   Anticipatory Grief [] []   Concern for Complicated Grief [] []   Suicidal Ideation or Plan [] []   Unable to assess [] [x]            Narrative:   Met with the patient during her initial office visit today. Patient is being seen for evaluation of Rectal cancer and PE. Patient is status post Hand-assisted laparoscopic low anterior resection, mobilization of the splenic flexure, and coloproctostomy. Tis-N0 on 2020. Patient lives with her . She and her  just  in 2020. She has one son from a prior relationship, who is 15years of age. The patient has a PCP - Dr. Long Esquivel. The patient works for the [employer]. The patient explained that her family is originally from [facility]. Her father  of rectal cancer. Patient's maternal grandmother and aunt had colon cancer. Provided this 's contact information and offered continued support as needed. Assessment/Action:   1. Introduced self and role of this  in the [employer]. 2.  Informed the patient of the [facility] and available resources there. 3.  Continue to meet with the patient when she returns to the clinic for ongoing assessment of the patients adjustment to her diagnosis and treatment.     4.  Ongoing psychosocial support as desired by patient. Plan/Referral:   No referrals placed at this time.    Derwin Primrose Carretto, LCSW

## 2020-09-25 NOTE — PROGRESS NOTES
[facility] at [address], [address], [address] Lizz[address] Red Cousin: [phone]  F: [phone]    Reason for Visit:   Laura Victor is a 45 y.o. female who is seen in consultation at the request of Dr. Soledad Ricks for evaluation of Rectal cancer and PE. Treatment History:   · 3/2020: Colonoscopy - one rectal/ sigmoid polyp  · 2020: Hand-assisted laparoscopic low anterior resection, mobilization of the splenic flexure, and coloproctostomy. Tis-N0. · 2020: CT no metastasis    History of Present Illness:   Patient is a 45 y.o. 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 She presented 2020 with SOB and was found to have proximal pulmonary emboli and was placed on Eliquis. She states she is well, she has no bleeding- she has no cough, sob, fevers, chills, sweats, HA, falls, rash, appetite and weight.      Last mammogram was done 2 years ago  She has no pregnancy related complications and has no personal h/o clots    She states that father  of rectal cancer   Maternal grandmother and aunt had colon cancer      Past Medical History:   Diagnosis Date    Diverticulosis     Fibroids     Uterine fibroids    Hypercholesterolemia     Hypertension     Obesity (BMI 35.0-39.9 without comorbidity)     Rectal cancer (HCC)       Past Surgical History:   Procedure Laterality Date    FLEXIBLE SIGMOIDOSCOPY N/A 2020    Flexible sigmoidoscopy and rectal endoscopic ultrasound; Valery Benitez MD    HX  SECTION  2006    HX COLONOSCOPY  2020    Rafael Ramirez MD    HX GYN  2003    Uterine myomectomy for fibroids; Chris Lizarraga MD    HX LAP CHOLECYSTECTOMY  2002    HX ORTHOPAEDIC Left 2005    ORIF ANKLE    HX OTHER SURGICAL  2020    Hand-assisted laparoscopic low anterior resection, mobilization of the splenic flexure, and coloproctostomy;  Ra Vega.  HX UROLOGICAL  2020    Cystourethroscopy and placement of temporary bilateral ureteral catheters; Babs Sprague MD.      Social History     Tobacco Use    Smoking status: Former Smoker     Last attempt to quit: 2013     Years since quittin.7    Smokeless tobacco: Never Used    Tobacco comment: ONE PACK/WEEK   Substance Use Topics    Alcohol use: Yes     Comment: occassional      Family History   Problem Relation Age of Onset    Asthma Mother     Elevated Lipids Father     Hypertension Father     Cancer Father         anal cancer    Diabetes Paternal Aunt     Cancer Maternal Grandmother         colon    Cancer Paternal Grandmother         leukemia    Anesth Problems Neg Hx      Current Outpatient Medications   Medication Sig    atorvastatin (LIPITOR) 40 mg tablet TAKE 1 TABLET DAILY (NEED  OFFICE VISIT FOR FUTURE    MEDICATION REFILL)    zolpidem (AMBIEN) 5 mg tablet Take 1 Tab by mouth nightly as needed for Sleep. Max Daily Amount: 5 mg.  apixaban (ELIQUIS) 5 mg tablet Take 1 Tab by mouth two (2) times a day.  HYDROmorphone (Dilaudid) 2 mg tablet Take 2 mg by mouth every six (6) hours as needed for Pain. X 10 pills only    acetaminophen 500 mg tab 500 mg, diphenhydrAMINE 25 mg cap 25 mg Take 1 Dose by mouth nightly as needed.  acetaminophen (Tylenol Extra Strength) 500 mg tablet Take  by mouth every six (6) hours as needed for Pain.  hydroCHLOROthiazide (HYDRODIURIL) 12.5 mg tablet TAKE 1 TABLET DAILY     No current facility-administered medications for this visit. Allergies   Allergen Reactions    Percocet [Oxycodone-Acetaminophen] Itching        Review of Systems: A complete review of systems was obtained, negative except as described above.     Physical Exam:     Visit Vitals  /72   Temp (!) 96.1 °F (35.6 °C)   Ht 5' 3\" (1.6 m)   LMP 2020   BMI 35.11 kg/m²     ECOG PS: 0  General: No distress  Eyes: PERRLA, anicteric sclerae  HENT: Atraumatic, OP clear  Neck: Supple  Lymphatic: No cervical, supraclavicular, or inguinal adenopathy  Respiratory: normal respiratory effort  CV: Normal rate,  no peripheral edema  GI: Soft, nontender, nondistended, no masses, no hepatomegaly, no splenomegaly  MS: Normal gait and station. Digits without clubbing or cyanosis. Skin: No rashes, ecchymoses, or petechiae. Normal temperature, turgor, and texture. Psych: Alert, oriented, appropriate affect, normal judgment/insight    Results:     Lab Results   Component Value Date/Time    WBC 6.6 05/25/2020 05:23 AM    HGB 9.6 (L) 05/25/2020 05:23 AM    HCT 29.6 (L) 05/25/2020 05:23 AM    PLATELET 093 (H) 19/45/8813 05:23 AM    MCV 94.3 05/25/2020 05:23 AM    ABS. NEUTROPHILS 6.5 05/23/2020 11:08 AM    Hemoglobin (POC) 14.1 05/07/2020 11:18 AM     Lab Results   Component Value Date/Time    Sodium 143 09/16/2020 08:16 AM    Potassium 4.2 09/16/2020 08:16 AM    Chloride 105 09/16/2020 08:16 AM    CO2 25 09/16/2020 08:16 AM    Glucose 89 09/16/2020 08:16 AM    BUN 12 09/16/2020 08:16 AM    Creatinine 1.07 (H) 09/16/2020 08:16 AM    GFR est AA 69 09/16/2020 08:16 AM    GFR est non-AA 60 09/16/2020 08:16 AM    Calcium 10.1 09/16/2020 08:16 AM     Lab Results   Component Value Date/Time    Bilirubin, total 0.4 09/16/2020 08:16 AM    ALT (SGPT) 36 (H) 09/16/2020 08:16 AM    Alk. phosphatase 91 09/16/2020 08:16 AM    Protein, total 7.1 09/16/2020 08:16 AM    Albumin 4.4 09/16/2020 08:16 AM    Globulin 4.4 (H) 05/24/2020 04:51 AM         Records reviewed and summarized above. Pathology report(s) reviewed above. Radiology report(s) reviewed above.   Reviewed CT as above    Assessment:   1) Pulmonary embolism- 5/23/2020    Provoked by surgery on 5/7/2020  Estimated risk of recurrence- 1 percent for the first year; 0.5 percent/year thereafter  She has been on anticoagulation for 4 months and per ACCP guidelines no additional anticoagulation is recommeded      2) Rectosigmoid adenocarcinoma  S/p Hand-assisted laparoscopic low anterior resection, mobilization of the splenic flexure, and coloproctostomy on 5/7/2020    TisN0M0-Stage 0  25 negative nodes    Reviewed NCCN guidelines for surveillance which does not suggest period scans  Recommend clinical follow up and colonoscopy as scheduled    3) Onalee Deutscher age at diagnosis  + FH  Will add MSI to path  Reviewed genetic testing as well  Will obtain next visit    4) Obesity  Reviewed the role of healthy BMI, Exercise, VD    Plan:     · MSI to path  · Stop Eliquis  · RTC 3 months with CEA    I appreciate the opportunity to participate in Ms. Gambino Milder care.     Signed By: Sheldon Shukla MD

## 2020-09-26 NOTE — PROGRESS NOTES
[facility],    For the most part your labs look good only areas of concern of noticed dysuria cholesterol which in which the LDL is slightly elevated and also your liver function particularly the ALT component I advised that we work on diet and exercise and weight loss as discussed and follow-up as advised.   If you should have any questions please let me know

## 2020-10-08 NOTE — PROGRESS NOTES
Chief Complaint   Patient presents with    Hypertension     follow up    Cholesterol Problem     follow up.  pt states she did have a cup of coffee this morning with sugar and cream.     \"REVIEWED RECORD IN PREPARATION FOR VISIT AND HAVE OBTAINED THE NECESSARY DOCUMENTATION\"  1. Have you been to the ER, urgent care clinic since your last visit? Hospitalized since your last visit? No    2. Have you seen or consulted any other health care providers outside of the [address] [place] [address] since your last visit? Include any pap smears or colon screening.  No FINKELSTEIN, MARTIN ; 11/06/2020 ; [facility] [address] [place] [address]

## 2020-11-13 DIAGNOSIS — G47.00 INSOMNIA, UNSPECIFIED TYPE: ICD-10-CM

## 2020-11-13 NOTE — TELEPHONE ENCOUNTER
No access to      Last visit 08/31/2020 MD Lori Ennis   Next appointment 03/01/2021 MD Swain   Previous refill encounter(s)   08/31/2020 Ambien #12     Requested Prescriptions     Pending Prescriptions Disp Refills    zolpidem (AMBIEN) 5 mg tablet 12 Tab 0     Sig: Take 1 Tab by mouth nightly as needed for Sleep. Max Daily Amount: 5 mg.

## 2020-11-17 RX ORDER — ZOLPIDEM TARTRATE 5 MG/1
5 TABLET ORAL
Qty: 12 TAB | Refills: 0 | Status: SHIPPED | OUTPATIENT
Start: 2020-11-17 | End: 2021-03-01 | Stop reason: SDUPTHER

## 2020-12-23 ENCOUNTER — TELEPHONE (OUTPATIENT)
Dept: ONCOLOGY | Age: 50
End: 2020-12-23

## 2020-12-23 NOTE — TELEPHONE ENCOUNTER
Called pt MARIAM verified. Called pt in reference to lab work.  Pt stated she will be getting her labs drawns on 12.28

## 2020-12-25 DIAGNOSIS — I26.99 ACUTE PULMONARY EMBOLISM, UNSPECIFIED PULMONARY EMBOLISM TYPE, UNSPECIFIED WHETHER ACUTE COR PULMONALE PRESENT (HCC): ICD-10-CM

## 2020-12-29 LAB
ALBUMIN SERPL-MCNC: 4.4 G/DL (ref 3.8–4.9)
ALBUMIN/GLOB SERPL: 1.6 {RATIO} (ref 1.2–2.2)
ALP SERPL-CCNC: 127 IU/L (ref 39–117)
ALT SERPL-CCNC: 31 IU/L (ref 0–32)
AST SERPL-CCNC: 24 IU/L (ref 0–40)
BASOPHILS # BLD AUTO: 0 X10E3/UL (ref 0–0.2)
BASOPHILS NFR BLD AUTO: 0 %
BILIRUB SERPL-MCNC: 0.5 MG/DL (ref 0–1.2)
BUN SERPL-MCNC: 14 MG/DL (ref 6–24)
BUN/CREAT SERPL: 13 (ref 9–23)
CALCIUM SERPL-MCNC: 9.6 MG/DL (ref 8.7–10.2)
CEA SERPL-MCNC: 3 NG/ML (ref 0–4.7)
CHLORIDE SERPL-SCNC: 104 MMOL/L (ref 96–106)
CO2 SERPL-SCNC: 23 MMOL/L (ref 20–29)
CREAT SERPL-MCNC: 1.08 MG/DL (ref 0.57–1)
EOSINOPHIL # BLD AUTO: 0 X10E3/UL (ref 0–0.4)
EOSINOPHIL NFR BLD AUTO: 1 %
ERYTHROCYTE [DISTWIDTH] IN BLOOD BY AUTOMATED COUNT: 12.1 % (ref 11.7–15.4)
GLOBULIN SER CALC-MCNC: 2.8 G/DL (ref 1.5–4.5)
GLUCOSE SERPL-MCNC: 110 MG/DL (ref 65–99)
HCT VFR BLD AUTO: 38.7 % (ref 34–46.6)
HGB BLD-MCNC: 13.1 G/DL (ref 11.1–15.9)
IMM GRANULOCYTES # BLD AUTO: 0 X10E3/UL (ref 0–0.1)
IMM GRANULOCYTES NFR BLD AUTO: 0 %
LYMPHOCYTES # BLD AUTO: 2.2 X10E3/UL (ref 0.7–3.1)
LYMPHOCYTES NFR BLD AUTO: 34 %
MCH RBC QN AUTO: 31.2 PG (ref 26.6–33)
MCHC RBC AUTO-ENTMCNC: 33.9 G/DL (ref 31.5–35.7)
MCV RBC AUTO: 92 FL (ref 79–97)
MONOCYTES # BLD AUTO: 0.5 X10E3/UL (ref 0.1–0.9)
MONOCYTES NFR BLD AUTO: 8 %
NEUTROPHILS # BLD AUTO: 3.7 X10E3/UL (ref 1.4–7)
NEUTROPHILS NFR BLD AUTO: 57 %
PLATELET # BLD AUTO: 333 X10E3/UL (ref 150–450)
POTASSIUM SERPL-SCNC: 4.3 MMOL/L (ref 3.5–5.2)
PROT SERPL-MCNC: 7.2 G/DL (ref 6–8.5)
RBC # BLD AUTO: 4.2 X10E6/UL (ref 3.77–5.28)
SODIUM SERPL-SCNC: 141 MMOL/L (ref 134–144)
WBC # BLD AUTO: 6.5 X10E3/UL (ref 3.4–10.8)

## 2020-12-31 ENCOUNTER — VIRTUAL VISIT (OUTPATIENT)
Dept: ONCOLOGY | Age: 50
End: 2020-12-31
Payer: COMMERCIAL

## 2020-12-31 DIAGNOSIS — C20 RECTAL CANCER (HCC): Primary | ICD-10-CM

## 2020-12-31 PROCEDURE — 99214 OFFICE O/P EST MOD 30 MIN: CPT | Performed by: INTERNAL MEDICINE

## 2020-12-31 RX ORDER — ESTRADIOL 1 MG/G
GEL TOPICAL DAILY
COMMUNITY
End: 2021-03-01

## 2020-12-31 NOTE — PROGRESS NOTES
Von Montgomery is a 45 y.o. female  Chief Complaint   Patient presents with    Follow-up     rectal cancer     1. Have you been to the ER, urgent care clinic since your last visit? Hospitalized since your last visit? No    2. Have you seen or consulted any other health care providers outside of the [address] [place] [address] since your last visit? Include any pap smears or colon screening. No

## 2020-12-31 NOTE — PROGRESS NOTES
[facility] at [place] [address] [address], [place]: [phone]  F: [phone]    Reason for Visit:   Bandar Quiroz is a 45 y.o. female who is seen for follow up of Rectal cancer and PE. Bandar Quiroz is a 45 y.o. female who is seen by synchronous (real-time) audio-video technology on 2020    Treatment History:   · 3/2020: Colonoscopy - one rectal/ sigmoid polyp  · 2020: Hand-assisted laparoscopic low anterior resection, mobilization of the splenic flexure, and coloproctostomy. Tis-N0. · 2020: CT no metastasis    History of Present Illness:   Patient is a 45 y.o. 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 She presented 2020 with SOB and was found to have proximal pulmonary emboli and was placed on Eliquis. I recommended stopping Eliquis after she took it for 3 months.  Now seen for follow up    Doing very well, has no fevers, bleeding, no new leg swelling CPain , SOB      Last mammogram was done 2 years ago  She has no pregnancy related complications and has no personal h/o clots    She states that father  of rectal cancer   Maternal grandmother and aunt had colon cancer      Past Medical History:   Diagnosis Date    Diverticulosis     Fibroids     Uterine fibroids    Hypercholesterolemia     Hypertension     Obesity (BMI 35.0-39.9 without comorbidity)     Rectal cancer ([address]       Past Surgical History:   Procedure Laterality Date    FLEXIBLE SIGMOIDOSCOPY N/A 2020    Flexible sigmoidoscopy and rectal endoscopic ultrasound; Dacia Leach MD    HX  SECTION  2006    HX COLONOSCOPY  2020    Shefali Licona MD    HX GYN  2003    Uterine myomectomy for fibroids; Irma Pinzon MD    HX LAP CHOLECYSTECTOMY  2002    HX ORTHOPAEDIC Left 2005    ORIF ANKLE    HX OTHER SURGICAL  2020    Hand-assisted laparoscopic low anterior resection, mobilization of the splenic flexure, and coloproctostomy; Dr. Lauro Balderrama.  HX UROLOGICAL  2020    Cystourethroscopy and placement of temporary bilateral ureteral catheters; Angela Mendiola MD.      Social History     Tobacco Use    Smoking status: Former Smoker     Quit date: 2013     Years since quittin.9    Smokeless tobacco: Never Used    Tobacco comment: ONE PACK/WEEK   Substance Use Topics    Alcohol use: Yes     Comment: occassional      Family History   Problem Relation Age of Onset    Asthma Mother     Elevated Lipids Father     Hypertension Father     Cancer Father         anal cancer    Diabetes Paternal Aunt     Cancer Maternal Grandmother         colon    Cancer Paternal Grandmother         leukemia    Anesth Problems Neg Hx      Current Outpatient Medications   Medication Sig    zolpidem (AMBIEN) 5 mg tablet Take 1 Tab by mouth nightly as needed for Sleep. Max Daily Amount: 5 mg.  atorvastatin (LIPITOR) 40 mg tablet TAKE 1 TABLET DAILY (NEED  OFFICE VISIT FOR FUTURE    MEDICATION REFILL)    apixaban (ELIQUIS) 5 mg tablet Take 1 Tab by mouth two (2) times a day.  HYDROmorphone (Dilaudid) 2 mg tablet Take 2 mg by mouth every six (6) hours as needed for Pain. X 10 pills only    acetaminophen 500 mg tab 500 mg, diphenhydrAMINE 25 mg cap 25 mg Take 1 Dose by mouth nightly as needed.  acetaminophen (Tylenol Extra Strength) 500 mg tablet Take  by mouth every six (6) hours as needed for Pain.  hydroCHLOROthiazide (HYDRODIURIL) 12.5 mg tablet TAKE 1 TABLET DAILY     No current facility-administered medications for this visit. Allergies   Allergen Reactions    Percocet [Oxycodone-Acetaminophen] Itching        Review of Systems: A complete review of systems was obtained, negative except as described above.     Physical Exam:     Visit Vitals  LMP 2020       Due to this being a TeleHealth evaluation, many elements of the physical examination are unable to be assessed. Constitutional: Appears well-developed and well-nourished in no apparent distress   Mental status: Alert and awake, Oriented to person/place/time, Able to follow commands  Eyes: EOM normal, Sclera normal, No visible ocular discharge  HENT: Normocephalic, atraumatic; Mouth/Throat: Moist mucous membranes, External Ears normal  Skin: No significant exanthematous lesions or discoloration noted on facial skin  Psychiatric: Normal affect, normal judgment/insight. No hallucinations       Results:     Lab Results   Component Value Date/Time    WBC 6.5 12/28/2020 10:41 AM    HGB 13.1 12/28/2020 10:41 AM    HCT 38.7 12/28/2020 10:41 AM    PLATELET 616 10/70/2388 10:41 AM    MCV 92 12/28/2020 10:41 AM    ABS. NEUTROPHILS 3.7 12/28/2020 10:41 AM    Hemoglobin (POC) 14.1 05/07/2020 11:18 AM     Lab Results   Component Value Date/Time    Sodium 141 12/28/2020 10:41 AM    Potassium 4.3 12/28/2020 10:41 AM    Chloride 104 12/28/2020 10:41 AM    CO2 23 12/28/2020 10:41 AM    Glucose 110 (H) 12/28/2020 10:41 AM    BUN 14 12/28/2020 10:41 AM    Creatinine 1.08 (H) 12/28/2020 10:41 AM    GFR est AA 69 12/28/2020 10:41 AM    GFR est non-AA 60 12/28/2020 10:41 AM    Calcium 9.6 12/28/2020 10:41 AM     Lab Results   Component Value Date/Time    Bilirubin, total 0.5 12/28/2020 10:41 AM    ALT (SGPT) 31 12/28/2020 10:41 AM    Alk. phosphatase 127 (H) 12/28/2020 10:41 AM    Protein, total 7.2 12/28/2020 10:41 AM    Albumin 4.4 12/28/2020 10:41 AM    Globulin 4.4 (H) 05/24/2020 04:51 AM     CEA:  Recent Labs     12/28/20  1041   [id number] 3.0           Records reviewed and summarized above. Pathology report(s) reviewed above. Radiology report(s) reviewed above.   Reviewed CT as above    Assessment:   1) Pulmonary embolism- 5/23/2020    Provoked by surgery on 5/7/2020  Estimated risk of recurrence- 1 percent for the first year; 0.5 percent/year thereafter  She was on anticoagulation for 4 months and per ACCP guidelines no additional anticoagulation is recommended hence this was stopped    I counseled her against using external estrogen    2) Rectosigmoid adenocarcinoma- MAXIM  S/p Hand-assisted laparoscopic low anterior resection, mobilization of the splenic flexure, and coloproctostomy on 5/7/2020    TisN0M0-Stage 0  25 negative nodes    Reviewed NCCN guidelines for surveillance which does not suggest periodic scans  Recommend clinical follow up and colonoscopy as scheduled with Dr. Janelle Middleton    3) Asad Ceron age at diagnosis  + [facility]  She declined genetic testing    4) Obesity  Reviewed the role of healthy BMI, Exercise, VD    Plan:     · She will follow with Dr. Jackson Simon hereon    No follow up  All questions were answered    I appreciate the opportunity to participate in Ms. Ignacio montejo. Signed By: Reva Norman MD      The patient was seen by synchronous (real-time) audio-video technology. I was in the office while conducting this encounter. The patient was at her home. Consent:  She and/or her healthcare decision maker is aware that this patient-initiated Telehealth encounter is a billable service, with coverage as determined by her insurance carrier. She is aware that she may receive a bill and has provided verbal consent to proceed: Yes    Pursuant to the emergency declaration under the [address] and the [facility], [facility] waiver authority and the [employer] General Act, this Virtual Visit was conducted, with patient's (and/or legal guardian's) consent, to reduce the patient's risk of exposure to COVID-19 and provide necessary medical care.

## 2021-03-01 ENCOUNTER — TELEPHONE (OUTPATIENT)
Dept: FAMILY MEDICINE CLINIC | Age: 51
End: 2021-03-01

## 2021-03-01 ENCOUNTER — OFFICE VISIT (OUTPATIENT)
Dept: FAMILY MEDICINE CLINIC | Age: 51
End: 2021-03-01
Payer: COMMERCIAL

## 2021-03-01 VITALS
HEART RATE: 73 BPM | RESPIRATION RATE: 18 BRPM | HEIGHT: 68 IN | BODY MASS INDEX: 33.8 KG/M2 | OXYGEN SATURATION: 98 % | WEIGHT: 223 LBS | SYSTOLIC BLOOD PRESSURE: 115 MMHG | DIASTOLIC BLOOD PRESSURE: 64 MMHG | TEMPERATURE: 97.4 F

## 2021-03-01 DIAGNOSIS — C20 RECTAL CANCER (HCC): ICD-10-CM

## 2021-03-01 DIAGNOSIS — E66.01 SEVERE OBESITY (HCC): ICD-10-CM

## 2021-03-01 DIAGNOSIS — I10 ESSENTIAL HYPERTENSION: Primary | ICD-10-CM

## 2021-03-01 DIAGNOSIS — G47.00 INSOMNIA, UNSPECIFIED TYPE: ICD-10-CM

## 2021-03-01 DIAGNOSIS — E78.2 MIXED HYPERLIPIDEMIA: ICD-10-CM

## 2021-03-01 DIAGNOSIS — Z12.31 SCREENING MAMMOGRAM, ENCOUNTER FOR: ICD-10-CM

## 2021-03-01 PROBLEM — I26.99 ACUTE PULMONARY EMBOLISM (HCC): Status: RESOLVED | Noted: 2020-05-23 | Resolved: 2021-03-01

## 2021-03-01 LAB
ALBUMIN SERPL-MCNC: 3.6 G/DL (ref 3.5–5)
ALBUMIN/GLOB SERPL: 1 {RATIO} (ref 1.1–2.2)
ALP SERPL-CCNC: 134 U/L (ref 45–117)
ALT SERPL-CCNC: 40 U/L (ref 12–78)
ANION GAP SERPL CALC-SCNC: 5 MMOL/L (ref 5–15)
AST SERPL-CCNC: 25 U/L (ref 15–37)
BILIRUB SERPL-MCNC: 0.4 MG/DL (ref 0.2–1)
BUN SERPL-MCNC: 9 MG/DL (ref 6–20)
BUN/CREAT SERPL: 9 (ref 12–20)
CALCIUM SERPL-MCNC: 8.6 MG/DL (ref 8.5–10.1)
CHLORIDE SERPL-SCNC: 108 MMOL/L (ref 97–108)
CHOLEST SERPL-MCNC: 223 MG/DL
CO2 SERPL-SCNC: 28 MMOL/L (ref 21–32)
CREAT SERPL-MCNC: 0.98 MG/DL (ref 0.55–1.02)
GLOBULIN SER CALC-MCNC: 3.7 G/DL (ref 2–4)
GLUCOSE SERPL-MCNC: 89 MG/DL (ref 65–100)
HDLC SERPL-MCNC: 52 MG/DL
HDLC SERPL: 4.3 {RATIO} (ref 0–5)
LDLC SERPL CALC-MCNC: 151.8 MG/DL (ref 0–100)
LIPID PROFILE,FLP: ABNORMAL
POTASSIUM SERPL-SCNC: 4.2 MMOL/L (ref 3.5–5.1)
PROT SERPL-MCNC: 7.3 G/DL (ref 6.4–8.2)
SODIUM SERPL-SCNC: 141 MMOL/L (ref 136–145)
TRIGL SERPL-MCNC: 96 MG/DL (ref ?–150)
VLDLC SERPL CALC-MCNC: 19.2 MG/DL

## 2021-03-01 PROCEDURE — 99214 OFFICE O/P EST MOD 30 MIN: CPT | Performed by: FAMILY MEDICINE

## 2021-03-01 RX ORDER — ZOLPIDEM TARTRATE 5 MG/1
5 TABLET ORAL
Qty: 12 TAB | Refills: 0 | Status: SHIPPED | OUTPATIENT
Start: 2021-03-01 | End: 2021-05-20 | Stop reason: SDUPTHER

## 2021-03-01 RX ORDER — PROGESTERONE 200 MG/1
CAPSULE ORAL
COMMUNITY
Start: 2020-12-02 | End: 2021-03-01

## 2021-03-01 NOTE — PROGRESS NOTES
HPI  Leonila Knappr 45 y.o. female  presents to the office today for a cholesterol problem and hypertension. Blood pressure 115/64, pulse 73, temperature 97.4 °F (36.3 °C), temperature source Temporal, resp. rate 18, height 5' 8\" (1.727 m), weight 223 lb (101.2 kg), last menstrual period 04/23/2020, SpO2 98 %. Body mass index is 33.91 kg/m². Chief Complaint   Patient presents with    Cholesterol Problem     6 month follow up    Hypertension        Hyperlipidemia: Lipid panel on 9/16/2020 notable for total cholesterol 197, HDL 46, , and triglycerides 128. Pt continues with Lipitor 40 mg/day. Hypertension: BP at office today 115/64. Pt continues with HCTZ 12.5 mg/day. Obesity: I have reviewed/discussed the above normal BMI with the patient. Pt inquires about restarting her prescription for Contrave 8-90 mg titration to assist with weight loss. I have provided pt with a refill of this medication. Pt will monitor her BP at home while on this medication. Additionally, I advised pt to avoid opoid medications as they cause negative side affects with this mediation. Rectal cancer: Stable. Pt is under the care of Dr. Reina Dia, Dr. Domenico Coates, and Dr. Abdullahi Richey for this condition. Pt reports she continues with her annual follow-up appointments. Insomnia: Stable. Pt continues with Ambien 5 mg/day PRN for sleep. Pt requests a refill of their medication, which I have granted. The Prescription Monitoring Program has been reviewed for recent activity regarding controlled substances for this patient. Health maintenance: Pt is due for her breast exam; she will call to make an appointment. Pt is also due for an updated mammogram; I have placed orders for this to be performed. Current Outpatient Medications   Medication Sig Dispense Refill    zolpidem (AMBIEN) 5 mg tablet Take 1 Tab by mouth nightly as needed for Sleep.  Max Daily Amount: 5 mg. 12 Tab 0    naltrexone-buPROPion (CONTRAVE) 8-90 mg TbER ER tablet Week 1 1 tab PO QAM, Week 2 1QAM 1QHS, Week 3 2QAM 1 QHS, Week 4 & beyond 2QAM 2QHS 360 Tab 1    atorvastatin (LIPITOR) 40 mg tablet TAKE 1 TABLET DAILY (NEED  OFFICE VISIT FOR FUTURE    MEDICATION REFILL) 90 Tab 1    acetaminophen (Tylenol Extra Strength) 500 mg tablet Take  by mouth every six (6) hours as needed for Pain.  hydroCHLOROthiazide (HYDRODIURIL) 12.5 mg tablet TAKE 1 TABLET DAILY 90 Tab 1     Allergies   Allergen Reactions    Percocet [Oxycodone-Acetaminophen] Itching     Past Medical History:   Diagnosis Date    Diverticulosis     Fibroids     Uterine fibroids    Hypercholesterolemia     Hypertension     Obesity (BMI 35.0-39.9 without comorbidity)     Rectal cancer (HCC)      Past Surgical History:   Procedure Laterality Date    FLEXIBLE SIGMOIDOSCOPY N/A 2020    Flexible sigmoidoscopy and rectal endoscopic ultrasound; Bi Cisneros MD    HX  SECTION  2006    HX COLONOSCOPY  2020    Davion Bal MD    HX GYN  2003    Uterine myomectomy for fibroids; Natalee Ni MD    HX LAP CHOLECYSTECTOMY      HX ORTHOPAEDIC Left 2005    ORIF ANKLE    HX OTHER SURGICAL  2020    Hand-assisted laparoscopic low anterior resection, mobilization of the splenic flexure, and coloproctostomy; Dr. Brigitte Goodman.  HX UROLOGICAL  2020    Cystourethroscopy and placement of temporary bilateral ureteral catheters;  Susanne Hitchcock MD.     Family History   Problem Relation Age of Onset    Asthma Mother     Elevated Lipids Father     Hypertension Father     Cancer Father         anal cancer    Diabetes Paternal Aunt     Cancer Maternal Grandmother         colon    Cancer Paternal Grandmother         leukemia    Anesth Problems Neg Hx      Social History     Tobacco Use    Smoking status: Former Smoker     Quit date: 2013     Years since quittin.1    Smokeless tobacco: Never Used    Tobacco comment: ONE PACK/WEEK   Substance Use Topics  Alcohol use: Yes     Comment: occassional        Review of Systems   Constitutional: Negative for chills and fever. HENT: Negative for hearing loss and tinnitus. Eyes: Negative for blurred vision and double vision. Respiratory: Negative for cough and shortness of breath. Cardiovascular: Negative for chest pain and palpitations. Gastrointestinal: Negative for nausea and vomiting. Genitourinary: Negative for dysuria and frequency. Musculoskeletal: Negative for back pain and falls. Skin: Negative for itching and rash. Neurological: Negative for dizziness, loss of consciousness and headaches. Endo/Heme/Allergies: Negative. Psychiatric/Behavioral: Negative for depression. The patient is not nervous/anxious. Physical Exam  Constitutional:       Appearance: Normal appearance. HENT:      Head: Normocephalic and atraumatic. Right Ear: Tympanic membrane, ear canal and external ear normal.      Left Ear: Tympanic membrane, ear canal and external ear normal.      Nose: Nose normal.      Mouth/Throat:      Mouth: Mucous membranes are moist.      Pharynx: Oropharynx is clear. Eyes:      Extraocular Movements: Extraocular movements intact. Conjunctiva/sclera: Conjunctivae normal.      Pupils: Pupils are equal, round, and reactive to light. Cardiovascular:      Rate and Rhythm: Normal rate and regular rhythm. Pulses: Normal pulses. Heart sounds: Normal heart sounds. Pulmonary:      Effort: Pulmonary effort is normal.      Breath sounds: Normal breath sounds. Abdominal:      General: Abdomen is flat. Bowel sounds are normal.      Palpations: Abdomen is soft. Genitourinary:     General: Normal vulva. Rectum: Normal.   Musculoskeletal: Normal range of motion. Skin:     General: Skin is warm and dry. Neurological:      General: No focal deficit present. Mental Status: She is alert and oriented to person, place, and time. Mental status is at baseline. Psychiatric:         Mood and Affect: Mood normal.         Behavior: Behavior normal.         Judgment: Judgment normal.           ASSESSMENT and PLAN  Diagnoses and all orders for this visit:    1. Essential hypertension  BP at office today 115/64. Pt continues with HCTZ 12.5 mg/day. Pt requests a refill of their medication, which I have granted. -     METABOLIC PANEL, COMPREHENSIVE; Future    2. Mixed hyperlipidemia  Lipid panel on 9/16/2020 notable for total cholesterol 197, HDL 46, , and triglycerides 128. Pt continues with Lipitor 40 mg/day. -     METABOLIC PANEL, COMPREHENSIVE; Future  -     LIPID PANEL; Future    3. Rectal cancer (HCC)  Stable. Pt is under the care of Dr. Domonique Ferris, Dr. Preston Chacon, and Dr. Ethel Contreras for this condition. Pt reports she continues with her annual follow-up appointments. Assessment & Plan: This condition is managed by Specialist.      4. Severe obesity ([facility] [address]  Pt will monitor her BP at home while on this medication. Additionally, I advised pt to avoid opoid medications as they cause negative side affects with this mediation. Assessment & Plan:  Stable, based on history, physical exam and review of pertinent labs, studies and medications; meds reconciled; continue current treatment plan. Orders:  -     naltrexone-buPROPion (CONTRAVE) 8-90 mg TbER ER tablet; Week 1 1 tab PO QAM, Week 2 1QAM 1QHS, Week 3 2QAM 1 QHS, Week 4 & beyond 2QAM 2QHS      5. Screening mammogram, encounter for  Pt is also due for an updated mammogram; I have placed orders for this to be performed. -     [facility] MAMMO BI SCREENING INCL CAD; Future    6. Insomnia, unspecified type  Stable. Pt continues with Ambien 5 mg/day PRN for sleep. Pt requests a refill of their medication, which I have granted. The Prescription Monitoring Program has been reviewed for recent activity regarding controlled substances for this patient. -     zolpidem (AMBIEN) 5 mg tablet;  Take 1 Tab by mouth nightly as needed for Sleep. Max Daily Amount: 5 mg. Follow-up and Dispositions    · Return in about 6 months (around 9/1/2021) for chronic follow up. Medication risks/benefits/costs/interactions/alternatives discussed with patient. Advised patient to call back or return to office if symptoms worsen/change/persist.  If patient cannot reach us or should anything more severe/urgent arise he/she should proceed directly to the nearest emergency department. Discussed expected course/resolution/complications of diagnosis in detail with patient. Patient given a written after visit summary which includes diagnoses, current medications and vitals. Patient expressed understanding with the diagnosis and plan. Written by fermín Nichole, as dictated by Gabbi Brown M.D.    8:47 AM - 9:04 AM    Total time spent with the patient 17 minutes, greater than 50% of time spent counseling patient.

## 2021-03-01 NOTE — TELEPHONE ENCOUNTER
----- Message from Michelle Laurent MD sent at 3/1/2021  8:55 AM EST -----  Regarding: colon screen  Please get colonscopy report from Dr. Elane Frankel

## 2021-03-01 NOTE — PROGRESS NOTES
Chief Complaint   Patient presents with    Cholesterol Problem     6 month follow up    Hypertension     1. Have you been to the ER, urgent care clinic since your last visit? Hospitalized since your last visit? No    2. Have you seen or consulted any other health care providers outside of the [address] [place] [address] since your last visit? Include any pap smears or colon screening.  No

## 2021-03-07 RX ORDER — ATORVASTATIN CALCIUM 40 MG/1
TABLET, FILM COATED ORAL
Qty: 90 TAB | Refills: 1 | Status: SHIPPED | OUTPATIENT
Start: 2021-03-07 | End: 2021-09-02

## 2021-03-07 NOTE — PROGRESS NOTES
MsCriss Miranda Isha,  Your cholesterol numbers are not looking good. Your LDL cholesterol is 151 he needs to be less then 100. Are you taking your Lipitor 40 mg daily? If not then we need to take it regularly if you are taking it regularly then you might need to increase the dosage from 40 to 80 mg daily and recheck your cholesterol panel in 3 months. If you have any questions please let me know.

## 2021-05-20 DIAGNOSIS — G47.00 INSOMNIA, UNSPECIFIED TYPE: ICD-10-CM

## 2021-05-20 RX ORDER — ZOLPIDEM TARTRATE 5 MG/1
5 TABLET ORAL
Qty: 12 TABLET | Refills: 0 | Status: SHIPPED | OUTPATIENT
Start: 2021-05-20 | End: 2021-08-10 | Stop reason: SDUPTHER

## 2021-07-28 ENCOUNTER — OFFICE VISIT (OUTPATIENT)
Dept: URGENT CARE | Age: 51
End: 2021-07-28
Payer: COMMERCIAL

## 2021-07-28 DIAGNOSIS — J20.9 ACUTE BRONCHITIS, UNSPECIFIED ORGANISM: ICD-10-CM

## 2021-07-28 DIAGNOSIS — Z20.822 SUSPECTED COVID-19 VIRUS INFECTION: Primary | ICD-10-CM

## 2021-07-28 PROCEDURE — 99202 OFFICE O/P NEW SF 15 MIN: CPT | Performed by: FAMILY MEDICINE

## 2021-07-28 RX ORDER — BENZONATATE 200 MG/1
200 CAPSULE ORAL
Qty: 21 CAPSULE | Refills: 0 | Status: SHIPPED | OUTPATIENT
Start: 2021-07-28 | End: 2021-08-04

## 2021-07-28 RX ORDER — AZITHROMYCIN 250 MG/1
TABLET, FILM COATED ORAL
Qty: 6 TABLET | Refills: 0 | Status: SHIPPED | OUTPATIENT
Start: 2021-07-28 | End: 2022-08-22

## 2021-07-28 NOTE — PROGRESS NOTES
URI   The history is provided by the patient. The current episode started more than 1 week ago (10 days). The problem has not changed since onset. There has been no fever. Associated symptoms include congestion (in sinus and chrest ), headaches, rhinorrhea, sinus pain and cough (with flame ). Pertinent negatives include no chest pain, no sore throat, no swollen glands and no wheezing. Treatments tried: dayquill. Past Medical History:   Diagnosis Date    Diverticulosis     Fibroids     Uterine fibroids    Hypercholesterolemia     Hypertension     Obesity (BMI 35.0-39.9 without comorbidity)     Rectal cancer [facility])         Past Surgical History:   Procedure Laterality Date    FLEXIBLE SIGMOIDOSCOPY N/A 2020    Flexible sigmoidoscopy and rectal endoscopic ultrasound; Marisol Rashid MD    HX  SECTION  2006    HX COLONOSCOPY  2020    Andrade Deleon MD    HX GYN  2003    Uterine myomectomy for fibroids; Jovi Membreno MD    HX LAP CHOLECYSTECTOMY      HX ORTHOPAEDIC Left 2005    ORIF ANKLE    HX OTHER SURGICAL  2020    Hand-assisted laparoscopic low anterior resection, mobilization of the splenic flexure, and coloproctostomy; Dr. Gema Mckeon.  HX UROLOGICAL  2020    Cystourethroscopy and placement of temporary bilateral ureteral catheters;  Adilene Lu MD.         Family History   Problem Relation Age of Onset    Asthma Mother     Elevated Lipids Father     Hypertension Father     Cancer Father         anal cancer    Diabetes Paternal Aunt     Cancer Maternal Grandmother         colon    Cancer Paternal Grandmother         leukemia    Anesth Problems Neg Hx         Social History     Socioeconomic History    Marital status: SINGLE     Spouse name: Not on file    Number of children: Not on file    Years of education: Not on file    Highest education level: Not on file   Occupational History    Not on file   Tobacco Use    Smoking status: Former Smoker     Quit date: 2013     Years since quittin.5    Smokeless tobacco: Never Used    Tobacco comment: ONE PACK/WEEK   Substance and Sexual Activity    Alcohol use: Yes     Comment: occassional    Drug use: No    Sexual activity: Yes     Partners: Male     Birth control/protection: Pill   Other Topics Concern    Not on file   Social History Narrative    Not on file     Social Determinants of Health     Financial Resource Strain:     Difficulty of Paying Living Expenses:    Food Insecurity:     Worried About Running Out of Food in the Last Year:     Ran Out of Food in the Last Year:    Transportation Needs:     Lack of Transportation (Medical):  Lack of Transportation (Non-Medical):    Physical Activity:     Days of Exercise per Week:     Minutes of Exercise per Session:    Stress:     Feeling of Stress :    Social Connections:     Frequency of Communication with Friends and Family:     Frequency of Social Gatherings with Friends and Family:     Attends Voodoo Services:     Active Member of Clubs or Organizations:     Attends Club or Organization Meetings:     Marital Status:    Intimate Partner Violence:     Fear of Current or Ex-Partner:     Emotionally Abused:     Physically Abused:     Sexually Abused: ALLERGIES: Percocet [oxycodone-acetaminophen]    Review of Systems   HENT: Positive for congestion (in sinus and chrest ), rhinorrhea and sinus pain. Negative for sore throat. Respiratory: Positive for cough (with flame ). Negative for wheezing. Cardiovascular: Negative for chest pain. Neurological: Positive for headaches. All other systems reviewed and are negative. There were no vitals filed for this visit. Physical Exam  Vitals and nursing note reviewed. Constitutional:       General: She is not in acute distress. Appearance: She is not ill-appearing. HENT:      Nose: No congestion or rhinorrhea.       Mouth/Throat:      Pharynx: No oropharyngeal exudate or posterior oropharyngeal erythema. Pulmonary:      Effort: Pulmonary effort is normal. No respiratory distress. Breath sounds: No wheezing, rhonchi or rales. MDM    Procedures        ICD-10-CM ICD-9-CM    1. Suspected COVID-19 virus infection  Z20.822 V01.79 NOVEL CORONAVIRUS (COVID-19)   2. Acute bronchitis, unspecified organism  J20.9 466.0        Use Mucinex DM twice a day    Medications Ordered Today   Medications    azithromycin (ZITHROMAX) 250 mg tablet     Sig: Take two tablets today then one tablet daily     Dispense:  6 Tablet     Refill:  0    benzonatate (TESSALON) 200 mg capsule     Sig: Take 1 Capsule by mouth three (3) times daily as needed for Cough for up to 7 days. Dispense:  21 Capsule     Refill:  0     No results found for any visits on 07/28/21. The patients condition was discussed with the patient and they understand. The patient is to follow up with primary care doctor. If signs and symptoms become worse the pt is to go to the ER. The patient is to take medications as prescribed.

## 2021-07-28 NOTE — PATIENT INSTRUCTIONS
Bronchitis: Care Instructions  Your Care Instructions     Bronchitis is inflammation of the bronchial tubes, which carry air to the lungs. The tubes swell and produce mucus, or phlegm. The mucus and inflamed bronchial tubes make you cough. You may have trouble breathing. Most cases of bronchitis are caused by viruses like those that cause colds. Antibiotics usually do not help and they may be harmful. Bronchitis usually develops rapidly and lasts about 2 to 3 weeks in otherwise healthy people. Follow-up care is a key part of your treatment and safety. Be sure to make and go to all appointments, and call your doctor if you are having problems. It's also a good idea to know your test results and keep a list of the medicines you take. How can you care for yourself at home? · Take all medicines exactly as prescribed. Call your doctor if you think you are having a problem with your medicine. · Get some extra rest.  · Take an over-the-counter pain medicine, such as acetaminophen (Tylenol), ibuprofen (Advil, Motrin), or naproxen (Aleve) to reduce fever and relieve body aches. Read and follow all instructions on the label. · Do not take two or more pain medicines at the same time unless the doctor told you to. Many pain medicines have acetaminophen, which is Tylenol. Too much acetaminophen (Tylenol) can be harmful. · Take an over-the-counter cough medicine that contains dextromethorphan to help quiet a dry, hacking cough so that you can sleep. Avoid cough medicines that have more than one active ingredient. Read and follow all instructions on the label. · Breathe moist air from a humidifier, hot shower, or sink filled with hot water. The heat and moisture will thin mucus so you can cough it out. · Do not smoke. Smoking can make bronchitis worse. If you need help quitting, talk to your doctor about stop-smoking programs and medicines. These can increase your chances of quitting for good.   When should you call for help? Call 911 anytime you think you may need emergency care. For example, call if:    · You have severe trouble breathing. Call your doctor now or seek immediate medical care if:    · You have new or worse trouble breathing.     · You cough up dark brown or bloody mucus (sputum).     · You have a new or higher fever.     · You have a new rash. Watch closely for changes in your health, and be sure to contact your doctor if:    · You cough more deeply or more often, especially if you notice more mucus or a change in the color of your mucus.     · You are not getting better as expected. Where can you learn more? Go to http://www.gray.com/  Enter H333 in the search box to learn more about \"Bronchitis: Care Instructions. \"  Current as of: October 26, 2020               Content Version: 12.8  © 0750-9327 [employer]. Care instructions adapted under license by [employer] (which disclaims liability or warranty for this information). If you have questions about a medical condition or this instruction, always ask your healthcare professional. [address] any warranty or liability for your use of this information.

## 2021-07-30 LAB
SARS-COV-2, NAA 2 DAY TAT: NORMAL
SARS-COV-2, NAA: NOT DETECTED

## 2021-08-01 ENCOUNTER — APPOINTMENT (OUTPATIENT)
Dept: GENERAL RADIOLOGY | Age: 51
End: 2021-08-01
Attending: EMERGENCY MEDICINE
Payer: COMMERCIAL

## 2021-08-01 ENCOUNTER — HOSPITAL ENCOUNTER (EMERGENCY)
Age: 51
Discharge: HOME | End: 2021-08-01
Attending: EMERGENCY MEDICINE
Payer: COMMERCIAL

## 2021-08-01 VITALS
HEIGHT: 63 IN | OXYGEN SATURATION: 100 % | BODY MASS INDEX: 37.89 KG/M2 | DIASTOLIC BLOOD PRESSURE: 71 MMHG | WEIGHT: 213.85 LBS | TEMPERATURE: 98.3 F | SYSTOLIC BLOOD PRESSURE: 137 MMHG | HEART RATE: 92 BPM | RESPIRATION RATE: 14 BRPM

## 2021-08-01 DIAGNOSIS — R09.89 CHEST CONGESTION: ICD-10-CM

## 2021-08-01 DIAGNOSIS — J20.9 BRONCHITIS, ACUTE, WITH BRONCHOSPASM: Primary | ICD-10-CM

## 2021-08-01 DIAGNOSIS — J02.9 PHARYNGITIS, UNSPECIFIED ETIOLOGY: ICD-10-CM

## 2021-08-01 PROCEDURE — 74011636637 HC RX REV CODE- 636/637: Performed by: PHYSICIAN ASSISTANT

## 2021-08-01 PROCEDURE — 71045 X-RAY EXAM CHEST 1 VIEW: CPT

## 2021-08-01 PROCEDURE — 99282 EMERGENCY DEPT VISIT SF MDM: CPT

## 2021-08-01 RX ORDER — ALBUTEROL SULFATE 90 UG/1
2 AEROSOL, METERED RESPIRATORY (INHALATION)
Qty: 1 INHALER | Refills: 0 | Status: SHIPPED | OUTPATIENT
Start: 2021-08-01 | End: 2021-08-15

## 2021-08-01 RX ORDER — PREDNISONE 10 MG/1
TABLET ORAL
Qty: 21 TABLET | Refills: 0 | Status: SHIPPED | OUTPATIENT
Start: 2021-08-02 | End: 2022-08-22

## 2021-08-01 RX ORDER — PREDNISONE 20 MG/1
60 TABLET ORAL
Status: COMPLETED | OUTPATIENT
Start: 2021-08-01 | End: 2021-08-01

## 2021-08-01 RX ORDER — HYDROCODONE POLISTIREX AND CHLORPHENIRAMINE POLISTIREX 10; 8 MG/5ML; MG/5ML
5 SUSPENSION, EXTENDED RELEASE ORAL
Qty: 60 ML | Refills: 0 | Status: SHIPPED | OUTPATIENT
Start: 2021-08-01 | End: 2021-08-04

## 2021-08-01 RX ADMIN — PREDNISONE 60 MG: 20 TABLET ORAL at 16:02

## 2021-08-01 NOTE — LETTER
[address]  [facility] EMER[facility] DEPT  [address] [place] [address]  Marie Henry [id number] [phone]    Work/School Note    Date: 8/1/2021    To Whom It May concern:      Tanner Brantley was seen and treated today in the emergency room. She may return to work in 1 to 2 days, as symptoms improve.         Sincerely,          Michael Villasenor

## 2021-08-02 ENCOUNTER — PATIENT OUTREACH (OUTPATIENT)
Dept: CASE MANAGEMENT | Age: 51
End: 2021-08-02

## 2021-08-02 NOTE — ED PROVIDER NOTES
EMERGENCY DEPARTMENT HISTORY AND PHYSICAL EXAM      Date: 8/1/2021  Patient Name: Alexi Webster    History of Presenting Illness     Chief Complaint   Patient presents with    Nasal Congestion     ongoing with cold and congestion for three weeks now; has been to urgent care tested negative for Covid tuesday; pt is on benzonate and Azithromycin but wants to be checked for pneumonia. she is taking delsym and mucinex       History Provided By: Patient    HPI: Alexi Webster, 45 y.o. female presents ambulatory to the emergency department with complaint of ongoing nasal congestion for the last 2 to 3 weeks. Patient states she was evaluated by urgent care and given Zithromax and Tessalon Perles but would like to be assessed for pneumonia. She had Covid testing performed which was negative. She is also taking Delsym and Mucinex without relief. She denied any fever or chills. She has had no nausea or vomiting. No diarrhea. She has been eating and drinking well. No rash or lesion. She has had no dysuria or hematuria. She has noted increased sore throat. She is a non-smoker. Denied any history of asthma. There are no other complaints, changes, or physical findings at this time. PCP: Darnell Kumar MD    Current Outpatient Medications   Medication Sig Dispense Refill    [START ON 8/2/2021] predniSONE (STERAPRED DS) 10 mg dose pack Take as directed 21 Tablet 0    albuterol (ProAir HFA) 90 mcg/actuation inhaler Take 2 Puffs by inhalation every four (4) hours as needed for Wheezing for up to 14 days. 1 Inhaler 0    HYDROcodone-chlorpheniramine (Tussionex Pennkinetic ER) 10-8 mg/5 mL suspension Take 5 mL by mouth every twelve (12) hours as needed for Cough for up to 3 days. Max Daily Amount: 10 mL.  60 mL 0    azithromycin (ZITHROMAX) 250 mg tablet Take two tablets today then one tablet daily 6 Tablet 0    benzonatate (TESSALON) 200 mg capsule Take 1 Capsule by mouth three (3) times daily as needed for Cough for up to 7 days. 21 Capsule 0    zolpidem (AMBIEN) 5 mg tablet Take 1 Tablet by mouth nightly as needed for Sleep. Max Daily Amount: 5 mg. 12 Tablet 0    atorvastatin (LIPITOR) 40 mg tablet TAKE 1 TABLET DAILY (NEED  OFFICE VISIT FOR FUTURE    MEDICATION REFILL) 90 Tab 1    naltrexone-buPROPion (CONTRAVE) 8-90 mg TbER ER tablet Week 1 1 tab PO QAM, Week 2 1QAM 1QHS, Week 3 2QAM 1 QHS, Week 4 & beyond 2QAM 2QHS 360 Tab 1    acetaminophen (Tylenol Extra Strength) 500 mg tablet Take  by mouth every six (6) hours as needed for Pain.  hydroCHLOROthiazide (HYDRODIURIL) 12.5 mg tablet TAKE 1 TABLET DAILY 90 Tab 1       Past History     Past Medical History:  Past Medical History:   Diagnosis Date    Diverticulosis     Fibroids     Uterine fibroids    Hypercholesterolemia     Hypertension     Obesity (BMI 35.0-39.9 without comorbidity)     Rectal cancer (HCC)        Past Surgical History:  Past Surgical History:   Procedure Laterality Date    FLEXIBLE SIGMOIDOSCOPY N/A 2020    Flexible sigmoidoscopy and rectal endoscopic ultrasound; Freddy Alegria MD    HX  SECTION  2006    HX COLONOSCOPY  2020    Kathleen Casillas MD    HX GYN  2003    Uterine myomectomy for fibroids; Pamela Holley MD    HX LAP CHOLECYSTECTOMY  2002    HX ORTHOPAEDIC Left 2005    ORIF ANKLE    HX OTHER SURGICAL  2020    Hand-assisted laparoscopic low anterior resection, mobilization of the splenic flexure, and coloproctostomy; Dr. Jerome Wallace.  HX UROLOGICAL  2020    Cystourethroscopy and placement of temporary bilateral ureteral catheters;  Ginette Goodman MD.       Family History:  Family History   Problem Relation Age of Onset    Asthma Mother     Elevated Lipids Father     Hypertension Father     Cancer Father         anal cancer    Diabetes Paternal Aunt     Cancer Maternal Grandmother         colon    Cancer Paternal Grandmother         leukemia    Anesth Problems Neg Hx Social History:  Social History     Tobacco Use    Smoking status: Former Smoker     Quit date: 2013     Years since quittin.5    Smokeless tobacco: Never Used    Tobacco comment: ONE PACK/WEEK   Substance Use Topics    Alcohol use: Yes     Comment: occassional    Drug use: No       Allergies: Allergies   Allergen Reactions    Percocet [Oxycodone-Acetaminophen] Itching         Review of Systems   Review of Systems   Constitutional: Negative for chills and fever. HENT: Positive for congestion. Negative for rhinorrhea and sore throat. Eyes: Negative for photophobia, discharge, redness, itching and visual disturbance. Respiratory: Positive for cough. Negative for chest tightness, shortness of breath and wheezing. Cardiovascular: Negative for chest pain and palpitations. Gastrointestinal: Negative for abdominal pain, diarrhea, nausea and vomiting. Endocrine: Negative for polydipsia, polyphagia and polyuria. Genitourinary: Negative for dysuria and hematuria. Musculoskeletal: Negative for myalgias, neck pain and neck stiffness. Skin: Negative for color change, pallor, rash and wound. Allergic/Immunologic: Negative for food allergies and immunocompromised state. Neurological: Negative for dizziness, weakness and headaches. Hematological: Negative for adenopathy. Does not bruise/bleed easily. Psychiatric/Behavioral: Negative for agitation and confusion. All other systems reviewed and are negative. Physical Exam   Physical Exam  Vitals and nursing note reviewed. Constitutional:       General: She is not in acute distress. Appearance: Normal appearance. She is well-developed and normal weight. She is not ill-appearing, toxic-appearing or diaphoretic. HENT:      Head: Normocephalic and atraumatic. Right Ear: Tympanic membrane, ear canal and external ear normal. There is no impacted cerumen.       Left Ear: Tympanic membrane, ear canal and external ear normal. There is no impacted cerumen. Nose: Congestion present. No rhinorrhea. Mouth/Throat:      Mouth: Mucous membranes are moist.      Pharynx: No oropharyngeal exudate or posterior oropharyngeal erythema. Eyes:      General: No scleral icterus. Right eye: No discharge. Left eye: No discharge. Conjunctiva/sclera: Conjunctivae normal.   Neck:      Thyroid: No thyromegaly. Vascular: No JVD. Trachea: No tracheal deviation. Cardiovascular:      Rate and Rhythm: Normal rate and regular rhythm. Pulses: Normal pulses. Heart sounds: Normal heart sounds. Pulmonary:      Effort: Pulmonary effort is normal. No respiratory distress. Breath sounds: Normal breath sounds. No wheezing or rhonchi. Abdominal:      Palpations: Abdomen is soft. Tenderness: There is no abdominal tenderness. There is no right CVA tenderness, left CVA tenderness, guarding or rebound. Musculoskeletal:         General: Normal range of motion. Cervical back: Normal range of motion and neck supple. Right lower leg: No edema. Left lower leg: No edema. Lymphadenopathy:      Cervical: No cervical adenopathy. Skin:     General: Skin is warm and dry. Coloration: Skin is not pale. Findings: No erythema or rash. Neurological:      General: No focal deficit present. Mental Status: She is alert and oriented to person, place, and time. Sensory: No sensory deficit. Motor: No weakness or abnormal muscle tone. Coordination: Coordination normal.   Psychiatric:         Mood and Affect: Mood normal.         Behavior: Behavior normal.         Judgment: Judgment normal.         Diagnostic Study Results     Labs -   No results found for this or any previous visit (from the past 12 hour(s)).     Radiologic Studies -   XR CHEST PORT   Final Result   Normal chest.          CXR Results  (Last 48 hours)               08/01/21 1539  XR CHEST PORT Final result Impression:  Normal chest.       Narrative:  EXAM: XR CHEST PORT       INDICATION: cough continues for three weeks ; negative twice for Covid. COMPARISON: 4/3/2020       FINDINGS: A portable AP radiograph of the chest was obtained at 1313 hours. .   The lungs are clear. The cardiac and mediastinal contours and pulmonary   vascularity are normal.  The bones and soft tissues are grossly within normal   limits. Medical Decision Making   I am the first provider for this patient. I reviewed the vital signs, available nursing notes, past medical history, past surgical history, family history and social history. Vital Signs-Reviewed the patient's vital signs. Patient Vitals for the past 12 hrs:   Temp Pulse Resp BP SpO2   08/01/21 1420 98.3 °F (36.8 °C) 92 14 137/71 100 %           Records Reviewed: Nursing Notes, Old Medical Records, Previous Radiology Studies and Previous Laboratory Studies    Provider Notes (Medical Decision Making):   PNA, bronchitis, bronchospasm, RAD    ED Course:   Initial assessment performed. The patients presenting problems have been discussed, and they are in agreement with the care plan formulated and outlined with them. I have encouraged them to ask questions as they arise throughout their visit. DISCHARGE NOTE:  The care plan has been outline with the patient and/or family, who verbally conveyed understanding and agreement. Available results have been reviewed. Patient and/or family understand the follow up plan as outlined and discharge instructions. Should their condition deterioration at any time after discharge the patient agrees to return, follow up sooner than outlined or seek medical assistance at the closest Emergency Room as soon as possible. Questions have been answered.  Discharge instructions and educational information regarding the patient's diagnosis as well a list of reasons why the patient would want to seek immediate medical attention, should their condition change, were reviewed directly with the patient/family          PLAN:  1. Discharge Medication List as of 8/1/2021  3:53 PM      START taking these medications    Details   predniSONE (STERAPRED DS) 10 mg dose pack Take as directed, Normal, Disp-21 Tablet, R-0      albuterol (ProAir HFA) 90 mcg/actuation inhaler Take 2 Puffs by inhalation every four (4) hours as needed for Wheezing for up to 14 days. , Normal, Disp-1 Inhaler, R-0      HYDROcodone-chlorpheniramine (Tussionex Pennkinetic ER) 10-8 mg/5 mL suspension Take 5 mL by mouth every twelve (12) hours as needed for Cough for up to 3 days. Max Daily Amount: 10 mL., Normal, Disp-60 mL, R-0         CONTINUE these medications which have NOT CHANGED    Details   azithromycin (ZITHROMAX) 250 mg tablet Take two tablets today then one tablet daily, Normal, Disp-6 Tablet, R-0      benzonatate (TESSALON) 200 mg capsule Take 1 Capsule by mouth three (3) times daily as needed for Cough for up to 7 days. , Normal, Disp-21 Capsule, R-0      zolpidem (AMBIEN) 5 mg tablet Take 1 Tablet by mouth nightly as needed for Sleep. Max Daily Amount: 5 mg., Normal, Disp-12 Tablet, R-0      atorvastatin (LIPITOR) 40 mg tablet TAKE 1 TABLET DAILY (NEED  OFFICE VISIT FOR FUTURE    MEDICATION REFILL), Normal, Disp-90 Tab, R-1      naltrexone-buPROPion (CONTRAVE) 8-90 mg TbER ER tablet Week 1 1 tab PO QAM, Week 2 1QAM 1QHS, Week 3 2QAM 1 QHS, Week 4 & beyond 2QAM 2QHS, Normal, Disp-360 Tab, R-1      acetaminophen (Tylenol Extra Strength) 500 mg tablet Take  by mouth every six (6) hours as needed for Pain., Historical Med      hydroCHLOROthiazide (HYDRODIURIL) 12.5 mg tablet TAKE 1 TABLET DAILY, Normal, Disp-90 Tab, R-1           2.    Follow-up Information     Follow up With Specialties Details Why Contact Info    Irene Coulter MD Family Medicine   [address] [facility]  [phone]      [facility] EMERGENCY DEPT Emergency Medicine  If symptoms worsen [phone] [address]  [phone]        Return to ED if worse     Diagnosis     Clinical Impression:   1. Bronchitis, acute, with bronchospasm    2. Pharyngitis, unspecified etiology    3.  Chest congestion

## 2021-08-02 NOTE — PROGRESS NOTES
ED 8/1/2021:  Bronchitis/Pharyngitis/Chest Congestion    1. Patient on Cov19 D/C TYLER Enrollment Report/fu Contact. Left message on voice mail with my contact information for return call. Need to assess for d/c needs post ED or Inpatient Admission. And/or TYLER enrollment/fu. 2.  Educated patient about risk for severe COVID-19 due to risk factors according to CDC guidelines. ACM reviewed discharge instructions, medical action plan and red flag symptoms with the patient who verbalized understanding. Discussed COVID vaccination status: yes. Education provided on COVID-19 vaccination as appropriate. Discussed exposure protocols and quarantine with CDC Guidelines. Patient was given an opportunity to verbalize any questions and concerns and agrees to contact ACM or health care provider for questions related to their healthcare.

## 2021-08-10 ENCOUNTER — VIRTUAL VISIT (OUTPATIENT)
Dept: FAMILY MEDICINE CLINIC | Age: 51
End: 2021-08-10
Payer: COMMERCIAL

## 2021-08-10 DIAGNOSIS — Z11.59 SCREENING FOR VIRAL DISEASE: ICD-10-CM

## 2021-08-10 DIAGNOSIS — I10 ESSENTIAL HYPERTENSION: ICD-10-CM

## 2021-08-10 DIAGNOSIS — J06.9 UPPER RESPIRATORY TRACT INFECTION, UNSPECIFIED TYPE: Primary | ICD-10-CM

## 2021-08-10 DIAGNOSIS — E78.2 MIXED HYPERLIPIDEMIA: ICD-10-CM

## 2021-08-10 DIAGNOSIS — G47.00 INSOMNIA, UNSPECIFIED TYPE: ICD-10-CM

## 2021-08-10 DIAGNOSIS — R05.9 COUGH: ICD-10-CM

## 2021-08-10 DIAGNOSIS — Z12.31 VISIT FOR SCREENING MAMMOGRAM: ICD-10-CM

## 2021-08-10 PROCEDURE — 99214 OFFICE O/P EST MOD 30 MIN: CPT | Performed by: FAMILY MEDICINE

## 2021-08-10 RX ORDER — ZOLPIDEM TARTRATE 5 MG/1
5 TABLET ORAL
Qty: 12 TABLET | Refills: 0 | Status: SHIPPED | OUTPATIENT
Start: 2021-08-10 | End: 2021-10-20 | Stop reason: SDUPTHER

## 2021-08-10 RX ORDER — CODEINE PHOSPHATE AND GUAIFENESIN 10; 100 MG/5ML; MG/5ML
5 SOLUTION ORAL
Qty: 120 ML | Refills: 0 | Status: SHIPPED | OUTPATIENT
Start: 2021-08-10 | End: 2021-08-17

## 2021-08-10 NOTE — PROGRESS NOTES
Tank Jessica is a 45 y.o. female who was seen by synchronous (real-time) audio-video technology on 8/10/2021. Consent:  Services were provided through a video synchronous discussion virtually to substitute for in-person appointment. She and/or her healthcare decision maker is aware that this patient-initiated Telehealth encounter is a billable service, with coverage as determined by her insurance carrier. She is aware that she may receive a bill and has provided verbal consent to proceed: Yes    I was in the office while conducting this encounter. Subjective:   Tank Jessica was seen for Cough    Hypertension: Pt continues with HCTZ 12.5mg daily. She denies checking her blood pressure at home, but reports that her blood pressure during her visit to the ER was ~130/80. Insomnia: Pt continues on Ambien 5mg. The Prescription Monitoring Program has been reviewed for recent activity regarding controlled substances for this patient. Pt requests a refill of their medication, which I have granted. Hyperlipidemia: Lipid panel on 03/01/21 notable for total cholesterol 223, HDL 52, , and triglycerides 96. Pt continues with Atorvastatin 40mg daily. The pt is in need of routine lab work. I have placed orders for a CBC, CM, and lipid panel to be completed in the near future and have instructed her to book an appointment at our office. Pt was seen at the ER on 08/01/21. She was given a Sterapred 10mg dose pack , and an Albuterol inhaler 90mcg 2 puffs QID PRN,and Robitussin AC  5mL TID PRN. She reports having respiratory symptoms while on vacation in [place] and she was concerned about being infected with COVID-19. She reports that her sxs have since improved. During today's OV she is still c/o an intermittent, nonproductive cough. She states that the OTC cough syrup and mucinex have not alleviated her sxs. Pt states that her cough is exacerbated at night when she lays down.  She went to urgent care on 07/28/21 and was rx'd a Z-harlan and Mucinex. Pt reports that she has run out of the Robitussin AC, and after discussing the matter with the pt, I have decided to restart her on the same regimen of Robitussin AC. The pt denies any allergies to codeine. Health Maintenance: Pt is due for her routine screening mammogram. I have placed an order for this screening and I have instructed her to set up an appointment in the near future. Pt will undergo a one time hepatitis C screening during her next OV. The pt is scheduled for her routine colonoscopy screening in 09/21. Prior to Admission medications    Medication Sig Start Date End Date Taking? Authorizing Provider   guaiFENesin-codeine (ROBITUSSIN AC) 100-10 mg/5 mL solution Take 5 mL by mouth three (3) times daily as needed for Cough for up to 7 days. Max Daily Amount: 15 mL. 8/10/21 8/17/21 Yes Laxmi Upton MD   zolpidem (AMBIEN) 5 mg tablet Take 1 Tablet by mouth nightly as needed for Sleep. Max Daily Amount: 5 mg. 8/10/21  Yes Laxmi Upton MD   predniSONE (STERAPRED DS) 10 mg dose pack Take as directed 8/2/21  Yes Michael Mckeon   albuterol (ProAir HFA) 90 mcg/actuation inhaler Take 2 Puffs by inhalation every four (4) hours as needed for Wheezing for up to 14 days. 8/1/21 8/15/21 Yes ADAMA Mckeon   azithromycin (ZITHROMAX) 250 mg tablet Take two tablets today then one tablet daily 7/28/21  Yes Ze Denson MD   atorvastatin (LIPITOR) 40 mg tablet TAKE 1 TABLET DAILY (NEED  OFFICE VISIT FOR FUTURE    MEDICATION REFILL) 3/7/21  Yes Laxmi Upton MD   naltrexone-buPROPion (CONTRAVE) 8-90 mg TbER ER tablet Week 1 1 tab PO QAM, Week 2 1QAM 1QHS, Week 3 2QAM 1 QHS, Week 4 & beyond 2QAM 2QHS 3/1/21  Yes Laxmi Upton MD   acetaminophen (Tylenol Extra Strength) 500 mg tablet Take  by mouth every six (6) hours as needed for Pain.    Yes Provider, Historical   hydroCHLOROthiazide (HYDRODIURIL) 12.5 mg tablet TAKE 1 TABLET DAILY 1/13/20  Yes Liam Carey MD     Allergies   Allergen Reactions    Percocet [Oxycodone-Acetaminophen] Itching     Past Medical History:   Diagnosis Date    Diverticulosis     Fibroids     Uterine fibroids    Hypercholesterolemia     Hypertension     Obesity (BMI 35.0-39.9 without comorbidity)     Rectal cancer [facility])      Past Surgical History:   Procedure Laterality Date    FLEXIBLE SIGMOIDOSCOPY N/A 2020    Flexible sigmoidoscopy and rectal endoscopic ultrasound; Celso Sorto MD    HX  SECTION  2006    HX COLONOSCOPY  2020    Bairon Barone MD    HX GYN  2003    Uterine myomectomy for fibroids; Carmela Yang MD    HX LAP CHOLECYSTECTOMY      HX ORTHOPAEDIC Left 2005    ORIF ANKLE    HX OTHER SURGICAL  2020    Hand-assisted laparoscopic low anterior resection, mobilization of the splenic flexure, and coloproctostomy; Dr. Parrish Skinner.  HX UROLOGICAL  2020    Cystourethroscopy and placement of temporary bilateral ureteral catheters; Edd Krishna MD.     Family History   Problem Relation Age of Onset    Asthma Mother     Elevated Lipids Father     Hypertension Father     Cancer Father         anal cancer    Diabetes Paternal Aunt     Cancer Maternal Grandmother         colon    Cancer Paternal Grandmother         leukemia    Anesth Problems Neg Hx      Social History     Tobacco Use    Smoking status: Former Smoker     Quit date: 2013     Years since quittin.5    Smokeless tobacco: Never Used    Tobacco comment: ONE PACK/WEEK   Substance Use Topics    Alcohol use: Yes     Comment: occassional        Review of Systems   Constitutional: Negative for chills and fever. HENT: Negative for hearing loss and tinnitus. Eyes: Negative for blurred vision and double vision. Respiratory: Positive for cough (Nonproductive). Negative for shortness of breath. Cardiovascular: Negative for chest pain and palpitations.    Gastrointestinal: Negative for nausea and vomiting. Genitourinary: Negative for dysuria and frequency. Musculoskeletal: Negative for back pain and falls. Skin: Negative for itching and rash. Neurological: Negative for dizziness, loss of consciousness and headaches. Endo/Heme/Allergies: Negative. Psychiatric/Behavioral: Negative for depression. The patient is not nervous/anxious. PHYSICAL EXAMINATION:  Vital Signs: (As obtained by patient/caregiver at home)  Visit Vitals  LMP 04/23/2020        Constitutional: [x] Appears well-developed and well-nourished [x] No apparent distress      [] Abnormal -     Mental status: [x] Alert and awake  [x] Oriented to person/place/time [x] Able to follow commands    [] Abnormal -     Eyes:   EOM    [x]  Normal    [] Abnormal -   Sclera  [x]  Normal    [] Abnormal -          Discharge [x]  None visible   [] Abnormal -     HENT: [x] Normocephalic, atraumatic  [] Abnormal -   [x] Mouth/Throat: Mucous membranes are moist    External Ears [x] Normal  [] Abnormal -    Neck: [x] No visualized mass [] Abnormal -     Pulmonary/Chest: [x] Respiratory effort normal   [x] No visualized signs of difficulty breathing or respiratory distress        [] Abnormal -      Musculoskeletal:   [x] Normal gait with no signs of ataxia         [x] Normal range of motion of neck        [] Abnormal -     Neurological:        [x] No Facial Asymmetry (Cranial nerve 7 motor function) (limited exam due to video visit)          [x] No gaze palsy        [] Abnormal -          Skin:        [x] No significant exanthematous lesions or discoloration noted on facial skin         [] Abnormal -            Psychiatric:       [x] Normal Affect [] Abnormal -        [x] No Hallucinations    Other pertinent observable physical exam findings:-    Assessment & Plan:   Diagnoses and all orders for this visit:    1. Upper respiratory tract infection, unspecified type  Pt was seen at the ER on 08/01/21.  She was given a Sterapred 10mg dose pack , and an Albuterol inhaler 90mcg 2 puffs QID PRN,and Robitussin AC  5mL TID PRN. She reports having respiratory symptoms while on vacation in [place] and she was concerned about being infected with COVID-19. She reports that her sxs have since improved. During today's OV she is still c/o an intermittent, nonproductive cough. She states that the OTC cough syrup and mucinex have not alleviated her sxs. Pt states that her cough is exacerbated at night when she lays down. She went to urgent care on 07/28/21 and was rx'd a Z-harlan and Mucinex. Pt reports that she has run out of the Robitussin AC, and after discussing the matter with the pt, I have decided to restart her on the same regimen of Robitussin AC. The pt denies any allergies to codeine. 2. Cough  Refer to #1.  -     guaiFENesin-codeine (ROBITUSSIN AC) 100-10 mg/5 mL solution; Take 5 mL by mouth three (3) times daily as needed for Cough for up to 7 days. Max Daily Amount: 15 mL. 3. Visit for screening mammogram  Pt is due for her routine screening mammogram. I have placed an order for this screening and I have instructed her to set up an appointment in the near future. -     ERMA MAMMO BI SCREENING INCL CAD; Future    4. Screening for viral disease  Pt will undergo a one time hepatitis C screening during her next OV.  -     HCV AB W/RFLX TO BURT; Future    5. Essential hypertension  Pt continues with HCTZ 12.5mg daily. She denies checking her blood pressure at home, but reports that her blood pressure during her visit to the ER was ~130/80.  -     CBC WITH AUTOMATED DIFF; Future    6. Mixed hyperlipidemia   Lipid panel on 03/01/21 notable for total cholesterol 223, HDL 52, , and triglycerides 96. Pt continues with Atorvastatin 40mg daily. The pt is in need of routine lab work.  I have placed orders for a CBC, CM, and lipid panel to be completed in the near future and have instructed her to book an appointment at our office.  -     METABOLIC PANEL, COMPREHENSIVE; Future  -     LIPID PANEL; Future    7. Insomnia, unspecified type  Pt continues on Ambien 5mg. The Prescription Monitoring Program has been reviewed for recent activity regarding controlled substances for this patient. Pt requests a refill of their medication, which I have granted. -     zolpidem (AMBIEN) 5 mg tablet; Take 1 Tablet by mouth nightly as needed for Sleep. Max Daily Amount: 5 mg. Follow-up and Dispositions    · Return in about 6 months (around 2/10/2022). We discussed the expected course, resolution and complications of the diagnosis(es) in detail. Medication risks, benefits, costs, interactions, and alternatives were discussed as indicated. I advised her to contact the office if her condition worsens, changes or fails to improve as anticipated. She expressed understanding with the diagnosis(es) and plan. Pursuant to the emergency declaration under the [employer] [facility], 1135 waiver authority and the [employer] Act, this Virtual Visit was conducted, with patient's consent, to reduce the patient's risk of exposure to COVID-19 and provide continuity of care for an established patient. Services were provided through a video synchronous discussion virtually to substitute for in-person clinic visit. Written by tayo Matias, as dictated by Marce Macedo M.D.    5:07 PM - 5:21 PM    Total time spent with the patient 14 minutes, greater than 50% of time spent counseling patient.

## 2021-09-02 RX ORDER — ATORVASTATIN CALCIUM 40 MG/1
TABLET, FILM COATED ORAL
Qty: 90 TABLET | Refills: 1 | Status: SHIPPED | OUTPATIENT
Start: 2021-09-02 | End: 2022-02-21 | Stop reason: SDUPTHER

## 2021-10-20 DIAGNOSIS — G47.00 INSOMNIA, UNSPECIFIED TYPE: ICD-10-CM

## 2021-10-20 RX ORDER — ZOLPIDEM TARTRATE 5 MG/1
5 TABLET ORAL
Qty: 12 TABLET | Refills: 0 | Status: CANCELLED | OUTPATIENT
Start: 2021-10-20

## 2021-10-21 RX ORDER — ZOLPIDEM TARTRATE 5 MG/1
5 TABLET ORAL
Qty: 12 TABLET | Refills: 0 | Status: SHIPPED | OUTPATIENT
Start: 2021-10-21 | End: 2022-02-21 | Stop reason: SDUPTHER

## 2022-01-06 ENCOUNTER — OFFICE VISIT (OUTPATIENT)
Dept: URGENT CARE | Age: 52
End: 2022-01-06

## 2022-01-06 VITALS — RESPIRATION RATE: 16 BRPM | HEART RATE: 97 BPM | TEMPERATURE: 98.8 F | OXYGEN SATURATION: 97 %

## 2022-01-06 DIAGNOSIS — Z11.59 SCREENING FOR VIRAL DISEASE: ICD-10-CM

## 2022-01-06 DIAGNOSIS — R68.83 CHILLS: ICD-10-CM

## 2022-01-06 DIAGNOSIS — R09.89 RUNNY NOSE: ICD-10-CM

## 2022-01-06 DIAGNOSIS — R51.9 ACUTE NONINTRACTABLE HEADACHE, UNSPECIFIED HEADACHE TYPE: ICD-10-CM

## 2022-01-06 DIAGNOSIS — R50.9 FEVER, UNSPECIFIED FEVER CAUSE: Primary | ICD-10-CM

## 2022-01-06 DIAGNOSIS — R52 BODY ACHES: ICD-10-CM

## 2022-01-06 DIAGNOSIS — R05.9 COUGH: ICD-10-CM

## 2022-01-06 LAB
FLUAV+FLUBV AG NOSE QL IA.RAPID: NEGATIVE
FLUAV+FLUBV AG NOSE QL IA.RAPID: NEGATIVE
VALID INTERNAL CONTROL?: YES

## 2022-01-06 PROCEDURE — 99213 OFFICE O/P EST LOW 20 MIN: CPT | Performed by: FAMILY MEDICINE

## 2022-01-06 PROCEDURE — 87804 INFLUENZA ASSAY W/OPTIC: CPT | Performed by: FAMILY MEDICINE

## 2022-01-06 NOTE — PROGRESS NOTES
This patient was seen at [address] [place] [address] Urgent Care while in their vehicle due to COVID-19 pandemic with PPE and focused examination in order to decrease community viral transmission. The patient/guardian gave verbal consent to treat. Laura Nicholson is a 45 y.o. female who presents with fever, chills, muscle aches, runny nose, cough and headache since yesterday. No known exposure to COVID-19. Denies cough, SOB, N/V/D. The history is provided by the patient. Past Medical History:   Diagnosis Date    Diverticulosis     Fibroids     Uterine fibroids    Hypercholesterolemia     Hypertension     Obesity (BMI 35.0-39.9 without comorbidity)     Rectal cancer [facility])         Past Surgical History:   Procedure Laterality Date    FLEXIBLE SIGMOIDOSCOPY N/A 2020    Flexible sigmoidoscopy and rectal endoscopic ultrasound; Fabiola Ricks MD    HX  SECTION  2006    HX COLONOSCOPY  2020    Emelyn Duong MD    HX GYN  2003    Uterine myomectomy for fibroids; Leighann Esquivel MD    HX LAP CHOLECYSTECTOMY      HX ORTHOPAEDIC Left 2005    ORIF ANKLE    HX OTHER SURGICAL  2020    Hand-assisted laparoscopic low anterior resection, mobilization of the splenic flexure, and coloproctostomy; Dr. Gregory Varghese.  HX UROLOGICAL  2020    Cystourethroscopy and placement of temporary bilateral ureteral catheters;  Trevor Davis MD.         Family History   Problem Relation Age of Onset    Asthma Mother     Elevated Lipids Father     Hypertension Father     Cancer Father         anal cancer    Diabetes Paternal Aunt     Cancer Maternal Grandmother         colon    Cancer Paternal Grandmother         leukemia    Anesth Problems Neg Hx         Social History     Socioeconomic History    Marital status: SINGLE     Spouse name: Not on file    Number of children: Not on file    Years of education: Not on file    Highest education level: Not on file Occupational History    Not on file   Tobacco Use    Smoking status: Former Smoker     Quit date: 2013     Years since quittin.9    Smokeless tobacco: Never Used    Tobacco comment: ONE PACK/WEEK   Substance and Sexual Activity    Alcohol use: Yes     Comment: occassional    Drug use: No    Sexual activity: Yes     Partners: Male     Birth control/protection: Pill   Other Topics Concern    Not on file   Social History Narrative    Not on file     Social Determinants of Health     Financial Resource Strain:     Difficulty of Paying Living Expenses: Not on file   Food Insecurity:     Worried About Running Out of Food in the Last Year: Not on file    Mega of Food in the Last Year: Not on file   Transportation Needs:     Lack of Transportation (Medical): Not on file    Lack of Transportation (Non-Medical): Not on file   Physical Activity:     Days of Exercise per Week: Not on file    Minutes of Exercise per Session: Not on file   Stress:     Feeling of Stress : Not on file   Social Connections:     Frequency of Communication with Friends and Family: Not on file    Frequency of Social Gatherings with Friends and Family: Not on file    Attends Yarsanism Services: Not on file    Active Member of [address] [place] [address] [employer] or Organizations: Not on file    Attends Club or Organization Meetings: Not on file    Marital Status: Not on file   Intimate Partner Violence:     Fear of Current or Ex-Partner: Not on file    Emotionally Abused: Not on file    Physically Abused: Not on file    Sexually Abused: Not on file   Housing Stability:     Unable to Pay for Housing in the Last Year: Not on file    Number of [place] in the Last Year: Not on file    Unstable Housing in the Last Year: Not on file                ALLERGIES: Percocet [oxycodone-acetaminophen]    Review of Systems   Constitutional: Positive for chills and fever. Negative for activity change and appetite change.    HENT: Positive for rhinorrhea. Respiratory: Positive for cough. Negative for shortness of breath. Musculoskeletal: Positive for myalgias. Neurological: Positive for headaches. Vitals:    01/06/22 1123   Pulse: 97   Resp: 16   Temp: 98.8 °F (37.1 °C)   SpO2: 97%       Physical Exam  Vitals and nursing note reviewed. Constitutional:       General: She is not in acute distress. Appearance: She is well-developed. She is not diaphoretic. Pulmonary:      Effort: Pulmonary effort is normal. No respiratory distress. Breath sounds: Normal breath sounds. No stridor. No wheezing, rhonchi or rales. Neurological:      Mental Status: She is alert. Psychiatric:         Behavior: Behavior normal.         Thought Content: Thought content normal.         Judgment: Judgment normal.         MDM    ICD-10-CM ICD-9-CM   1. Fever, unspecified fever cause  R50.9 780.60   2. Chills  R68.83 780.64   3. Body aches  R52 780.96   4. Runny nose  R09.89 784.99   5. Acute nonintractable headache, unspecified headache type  R51.9 784.0   6. Cough  R05.9 786.2   7. Screening for viral disease  Z11.59 V73.99       Orders Placed This Encounter    NOVEL CORONAVIRUS (COVID-19)     Scheduling Instructions:      1) Due to current limited availability of the COVID-19 PCR test, tests will be prioritized and may not be completed.              2) Order only if the test result will change clinical management or necessary for a return to mission-critical employment decision.              3) Print and instruct patient to adhere to CDC home isolation program. (Link Above)              4) Set up or refer patient for a monitoring program.              5) Have patient sign up for and leverage [employer] (if not previously done). Order Specific Question:   Is this test for diagnosis or screening? Answer:   Diagnosis of ill patient     Order Specific Question:   Symptomatic for COVID-19 as defined by CDC?      Answer:   Yes     Order Specific Question:   Date of Symptom Onset     Answer:   1/5/2022     Order Specific Question:   Hospitalized for COVID-19? Answer:   No     Order Specific Question:   Admitted to ICU for COVID-19? Answer:   No     Order Specific Question:   Employed in healthcare setting? Answer:   Unknown     Order Specific Question:   Resident in a congregate (group) care setting? Answer:   Unknown     Order Specific Question:   Pregnant? Answer:   No     Order Specific Question:   Previously tested for COVID-19? Answer: Yes    AMB POC UZIEL INFLUENZA A/B TEST        Quarantine, await COVID results  Deep breathing exercises, ambulation  Tylenol prn  Increase fluids with electrolytes if decreased PO intake      If signs and symptoms become worse the pt is to go to the ER.      Results for orders placed or performed in visit on 01/06/22   AMB POC UZIEL INFLUENZA A/B TEST   Result Value Ref Range    VALID INTERNAL CONTROL POC Yes     Influenza A Ag POC Negative Negative    Influenza B Ag POC Negative Negative       Procedures

## 2022-01-09 LAB
SARS-COV-2, NAA 2 DAY TAT: NORMAL
SARS-COV-2, NAA: DETECTED

## 2022-02-21 ENCOUNTER — OFFICE VISIT (OUTPATIENT)
Dept: FAMILY MEDICINE CLINIC | Age: 52
End: 2022-02-21
Payer: COMMERCIAL

## 2022-02-21 VITALS
RESPIRATION RATE: 16 BRPM | HEART RATE: 78 BPM | DIASTOLIC BLOOD PRESSURE: 78 MMHG | TEMPERATURE: 97.2 F | SYSTOLIC BLOOD PRESSURE: 100 MMHG | OXYGEN SATURATION: 98 % | BODY MASS INDEX: 38.62 KG/M2 | HEIGHT: 63 IN | WEIGHT: 218 LBS

## 2022-02-21 DIAGNOSIS — E78.2 MIXED HYPERLIPIDEMIA: Primary | ICD-10-CM

## 2022-02-21 DIAGNOSIS — E66.01 SEVERE OBESITY (BMI 35.0-39.9) WITH COMORBIDITY (HCC): ICD-10-CM

## 2022-02-21 DIAGNOSIS — G47.00 INSOMNIA, UNSPECIFIED TYPE: ICD-10-CM

## 2022-02-21 DIAGNOSIS — C20 RECTAL CANCER (HCC): ICD-10-CM

## 2022-02-21 DIAGNOSIS — I10 ESSENTIAL HYPERTENSION: ICD-10-CM

## 2022-02-21 LAB
ALBUMIN SERPL-MCNC: 3.8 G/DL (ref 3.5–5)
ALBUMIN/GLOB SERPL: 1 {RATIO} (ref 1.1–2.2)
ALP SERPL-CCNC: 119 U/L (ref 45–117)
ALT SERPL-CCNC: 35 U/L (ref 12–78)
ANION GAP SERPL CALC-SCNC: 2 MMOL/L (ref 5–15)
AST SERPL-CCNC: 19 U/L (ref 15–37)
BASOPHILS # BLD: 0 K/UL (ref 0–0.1)
BASOPHILS NFR BLD: 1 % (ref 0–1)
BILIRUB SERPL-MCNC: 0.7 MG/DL (ref 0.2–1)
BUN SERPL-MCNC: 15 MG/DL (ref 6–20)
BUN/CREAT SERPL: 15 (ref 12–20)
CALCIUM SERPL-MCNC: 9.4 MG/DL (ref 8.5–10.1)
CHLORIDE SERPL-SCNC: 106 MMOL/L (ref 97–108)
CHOLEST SERPL-MCNC: 190 MG/DL
CO2 SERPL-SCNC: 30 MMOL/L (ref 21–32)
CREAT SERPL-MCNC: 1.01 MG/DL (ref 0.55–1.02)
DIFFERENTIAL METHOD BLD: ABNORMAL
EOSINOPHIL # BLD: 0.1 K/UL (ref 0–0.4)
EOSINOPHIL NFR BLD: 1 % (ref 0–7)
ERYTHROCYTE [DISTWIDTH] IN BLOOD BY AUTOMATED COUNT: 12.2 % (ref 11.5–14.5)
GLOBULIN SER CALC-MCNC: 3.8 G/DL (ref 2–4)
GLUCOSE SERPL-MCNC: 123 MG/DL (ref 65–100)
HCT VFR BLD AUTO: 41.3 % (ref 35–47)
HDLC SERPL-MCNC: 43 MG/DL
HDLC SERPL: 4.4 {RATIO} (ref 0–5)
HGB BLD-MCNC: 12.7 G/DL (ref 11.5–16)
IMM GRANULOCYTES # BLD AUTO: 0 K/UL (ref 0–0.04)
IMM GRANULOCYTES NFR BLD AUTO: 0 % (ref 0–0.5)
LDLC SERPL CALC-MCNC: 123.8 MG/DL (ref 0–100)
LYMPHOCYTES # BLD: 3.6 K/UL (ref 0.8–3.5)
LYMPHOCYTES NFR BLD: 53 % (ref 12–49)
MCH RBC QN AUTO: 30.6 PG (ref 26–34)
MCHC RBC AUTO-ENTMCNC: 30.8 G/DL (ref 30–36.5)
MCV RBC AUTO: 99.5 FL (ref 80–99)
MONOCYTES # BLD: 0.5 K/UL (ref 0–1)
MONOCYTES NFR BLD: 8 % (ref 5–13)
NEUTS SEG # BLD: 2.5 K/UL (ref 1.8–8)
NEUTS SEG NFR BLD: 37 % (ref 32–75)
NRBC # BLD: 0 K/UL (ref 0–0.01)
NRBC BLD-RTO: 0 PER 100 WBC
PLATELET # BLD AUTO: 369 K/UL (ref 150–400)
PMV BLD AUTO: 10.2 FL (ref 8.9–12.9)
POTASSIUM SERPL-SCNC: 3.9 MMOL/L (ref 3.5–5.1)
PROT SERPL-MCNC: 7.6 G/DL (ref 6.4–8.2)
RBC # BLD AUTO: 4.15 M/UL (ref 3.8–5.2)
SODIUM SERPL-SCNC: 138 MMOL/L (ref 136–145)
TRIGL SERPL-MCNC: 116 MG/DL (ref ?–150)
VLDLC SERPL CALC-MCNC: 23.2 MG/DL
WBC # BLD AUTO: 6.8 K/UL (ref 3.6–11)

## 2022-02-21 PROCEDURE — 99214 OFFICE O/P EST MOD 30 MIN: CPT | Performed by: FAMILY MEDICINE

## 2022-02-21 RX ORDER — ATORVASTATIN CALCIUM 40 MG/1
TABLET, FILM COATED ORAL
Qty: 90 TABLET | Refills: 1 | Status: SHIPPED | OUTPATIENT
Start: 2022-02-21 | End: 2022-08-23

## 2022-02-21 RX ORDER — HYDROCHLOROTHIAZIDE 12.5 MG/1
TABLET ORAL
Qty: 90 TABLET | Refills: 1 | Status: SHIPPED | OUTPATIENT
Start: 2022-02-21 | End: 2022-10-12

## 2022-02-21 RX ORDER — ZOLPIDEM TARTRATE 5 MG/1
5 TABLET ORAL
Qty: 12 TABLET | Refills: 0 | Status: SHIPPED | OUTPATIENT
Start: 2022-02-21 | End: 2022-06-08 | Stop reason: SDUPTHER

## 2022-02-21 NOTE — PROGRESS NOTES
HPI  Karin Jefferson 45 y.o. female  presents to the office today for Cholesterol Problem, HTN, and Anxiety. Blood pressure 100/78, pulse 78, temperature 97.2 °F (36.2 °C), temperature source Temporal, resp. rate 16, height 5' 3\" (1.6 m), weight 218 lb (98.9 kg), last menstrual period 04/23/2020, SpO2 98 %. Body mass index is 38.62 kg/m². Chief Complaint   Patient presents with    Cholesterol Problem    Hypertension    Anxiety      Hyperlipidemia: Lipid panel on 08/23/21 notable for total cholesterol 223, HDL 54, .8, and triglycerides 96. Pt continues with Lipitor 40mg take 1 tablet daily. Pt requests a refill of their medication, which I have granted. Pt will have updated lab work performed during today's OV. Insomnia: Pt has a MHx of insomnia and is currently on Ambien 5mg take 1 tablet orally daily as needed for Sleep. Max Daily Amount: 5mg. Pt requests a refill of their medication, which I have granted. The Prescription Monitoring Program has been reviewed for recent activity regarding controlled substances for this patient. Severe Obesity: I have reviewed/discussed the above normal BMI with the patient. I have recommended the following interventions: dietary management education, guidance, and counseling, encourage exercise and monitor weight. Hypertension: BP at office today 100/78. Pt continues with HCTZ 12.5mg take 1 tablet daily. Pt requests a refill of their medication, which I have granted. Rectal Cancer: Pt has a MHx of Rectal Cancer and is seeing Segun Manriquez, Colon and Rectal Surgery and , Gastroenterology, for Colon Cancer Screening. Health Maintenance: Pt reports that she has received her COVID-19 booster vaccination and will update me with the dates in the future. Pt reports that she has received her Influenza vaccination on 10/2021. Pt reports that she has received her pap smear by Eliz Mullen, OB/GYN.       Current Outpatient Medications Medication Sig Dispense Refill    zolpidem (AMBIEN) 5 mg tablet Take 1 Tablet by mouth nightly as needed for Sleep. Max Daily Amount: 5 mg. 12 Tablet 0    hydroCHLOROthiazide (HYDRODIURIL) 12.5 mg tablet TAKE 1 TABLET DAILY 90 Tablet 1    atorvastatin (LIPITOR) 40 mg tablet TAKE 1 TABLET DAILY (NEED  OFFICE VISIT FOR FUTURE    MEDICATION REFILL) 90 Tablet 1    predniSONE (STERAPRED DS) 10 mg dose pack Take as directed (Patient not taking: Reported on 2022) 21 Tablet 0    azithromycin (ZITHROMAX) 250 mg tablet Take two tablets today then one tablet daily (Patient not taking: Reported on 2022) 6 Tablet 0    naltrexone-buPROPion (CONTRAVE) 8-90 mg TbER ER tablet Week 1 1 tab PO QAM, Week 2 1QAM 1QHS, Week 3 2QAM 1 QHS, Week 4 & beyond 2QAM 2QHS (Patient not taking: Reported on 2022) 360 Tab 1    acetaminophen (Tylenol Extra Strength) 500 mg tablet Take  by mouth every six (6) hours as needed for Pain. (Patient not taking: Reported on 2022)       Allergies   Allergen Reactions    Percocet [Oxycodone-Acetaminophen] Itching     No longer an allergy     Past Medical History:   Diagnosis Date    Diverticulosis     Fibroids     Uterine fibroids    Hypercholesterolemia     Hypertension     Obesity (BMI 35.0-39.9 without comorbidity)     Rectal cancer (HCC)      Past Surgical History:   Procedure Laterality Date    FLEXIBLE SIGMOIDOSCOPY N/A 2020    Flexible sigmoidoscopy and rectal endoscopic ultrasound; Austin Vera MD    HX  SECTION  2006    HX COLONOSCOPY  2020    Catracho Arreola MD    HX GYN  2003    Uterine myomectomy for fibroids; Kailash Ochoa MD    HX LAP CHOLECYSTECTOMY  2002    HX ORTHOPAEDIC Left 2005    ORIF ANKLE    HX OTHER SURGICAL  2020    Hand-assisted laparoscopic low anterior resection, mobilization of the splenic flexure, and coloproctostomy; Dr. Amira Paredes.     HX UROLOGICAL  2020    Cystourethroscopy and placement of temporary bilateral ureteral catheters; Juanjo Walters MD.     Family History   Problem Relation Age of Onset    Asthma Mother     Elevated Lipids Father     Hypertension Father     Cancer Father         anal cancer    Diabetes Paternal Aunt     Cancer Maternal Grandmother         colon    Cancer Paternal Grandmother         leukemia    Anesth Problems Neg Hx      Social History     Tobacco Use    Smoking status: Former Smoker     Quit date: 2013     Years since quittin.1    Smokeless tobacco: Never Used    Tobacco comment: ONE PACK/WEEK   Substance Use Topics    Alcohol use: Yes     Comment: occassional        Review of Systems   Constitutional: Negative for chills and fever. HENT: Negative for hearing loss and tinnitus. Eyes: Negative for blurred vision and double vision. Respiratory: Negative for cough and shortness of breath. Cardiovascular: Negative for chest pain and palpitations. Gastrointestinal: Negative for nausea and vomiting. Genitourinary: Negative for dysuria and frequency. Musculoskeletal: Negative for back pain and falls. Skin: Negative for itching and rash. Neurological: Negative for dizziness, loss of consciousness and headaches. Endo/Heme/Allergies: Negative. Psychiatric/Behavioral: Negative for depression. The patient is not nervous/anxious. Physical Exam  Constitutional:       Appearance: Normal appearance. HENT:      Head: Normocephalic and atraumatic. Right Ear: Tympanic membrane, ear canal and external ear normal.      Left Ear: Tympanic membrane, ear canal and external ear normal.      Nose: Nose normal.      Mouth/Throat:      Mouth: Mucous membranes are moist.      Pharynx: Oropharynx is clear. Eyes:      Extraocular Movements: Extraocular movements intact. Conjunctiva/sclera: Conjunctivae normal.      Pupils: Pupils are equal, round, and reactive to light.    Cardiovascular:      Rate and Rhythm: Normal rate and regular rhythm. Pulses: Normal pulses. Heart sounds: Normal heart sounds. Pulmonary:      Effort: Pulmonary effort is normal.      Breath sounds: Normal breath sounds. Abdominal:      General: Abdomen is flat. Bowel sounds are normal.      Palpations: Abdomen is soft. Genitourinary:     General: Normal vulva. Rectum: Normal.   Musculoskeletal:         General: Normal range of motion. Skin:     General: Skin is warm and dry. Neurological:      General: No focal deficit present. Mental Status: She is alert and oriented to person, place, and time. Mental status is at baseline. Psychiatric:         Mood and Affect: Mood normal.         Behavior: Behavior normal.         Judgment: Judgment normal.           ASSESSMENT and PLAN  Diagnoses and all orders for this visit:    1. Mixed hyperlipidemia  Lipid panel on 08/23/21 notable for total cholesterol 223, HDL 54, .8, and triglycerides 96. Pt continues with Lipitor 40mg take 1 tablet daily. Pt requests a refill of their medication, which I have granted. Pt will have updated lab work performed during today's OV.   -     LIPID PANEL; Future    2. Insomnia, unspecified type  Pt has a MHx of insomnia and is currently on Ambien 5mg take 1 tablet orally daily as needed for Sleep. Max Daily Amount: 5mg. Pt requests a refill of their medication, which I have granted. The Prescription Monitoring Program has been reviewed for recent activity regarding controlled substances for this patient. -     zolpidem (AMBIEN) 5 mg tablet; Take 1 Tablet by mouth nightly as needed for Sleep. Max Daily Amount: 5 mg. 3. Essential hypertension  BP at office today 100/78. Pt continues with HCTZ 12.5mg take 1 tablet daily. Pt requests a refill of their medication, which I have granted. -     hydroCHLOROthiazide (HYDRODIURIL) 12.5 mg tablet; TAKE 1 TABLET DAILY  -     METABOLIC PANEL, COMPREHENSIVE; Future  -     CBC WITH AUTOMATED DIFF; Future    4.  Rectal cancer ([facility] [address]  Pt has a MHx of Rectal Cancer and is seeing Samuel Nielson and Rectal Surgery and , Gastroenterology, for Colon Cancer Screening. Assessment & Plan:   monitored by specialist. No acute findings meriting change in the plan    5. Severe obesity (BMI 35.0-39. 9) with comorbidity ([facility] [address]  I have reviewed/discussed the above normal BMI with the patient. I have recommended the following interventions: dietary management education, guidance, and counseling, encourage exercise and monitor weight. Other orders  Lipid panel on 08/23/21 notable for total cholesterol 223, HDL 54, .8, and triglycerides 96. Pt continues with Lipitor 40mg take 1 tablet daily. Pt requests a refill of their medication, which I have granted. -     atorvastatin (LIPITOR) 40 mg tablet; TAKE 1 TABLET DAILY (NEED  OFFICE VISIT FOR FUTURE MEDICATION REFILL)    Follow-up and Dispositions    · Return in about 6 months (around 8/21/2022) for follow up. Medication risks/benefits/costs/interactions/alternatives discussed with patient. Advised patient to call back or return to office if symptoms worsen/change/persist.  If patient cannot reach us or should anything more severe/urgent arise he/she should proceed directly to the nearest emergency department. Discussed expected course/resolution/complications of diagnosis in detail with patient. Patient given a written after visit summary which includes diagnoses, current medications and vitals. Patient expressed understanding with the diagnosis and plan. Written by fermín Arciniega, as dictated by Bonnie Ulloa M.D.    8:41 AM - 8:52 AM    Total time spent with the patient 11 minutes, greater than 50% of time spent counseling patient.

## 2022-02-21 NOTE — PROGRESS NOTES
Chief Complaint   Patient presents with    Cholesterol Problem    Hypertension    Anxiety     1. \"Have you been to the ER, urgent care clinic since your last visit? Hospitalized since your last visit? \" yes Urgent care covid test positive     2. \"Have you seen or consulted any other health care providers outside of the [address] [place] [address] since your last visit? \"  no    3. For patients aged 39-70: Has the patient had a colonoscopy / FIT/ Cologuard? YES 2021      If the patient is female:    4. For patients aged 41-77: Has the patient had a mammogram within the past 2 years? No needs to schedule     5. For patients aged 21-65: Has the patient had a pap smear?    Yes - jan 2022 [facility]

## 2022-03-06 NOTE — PROGRESS NOTES
Mrs. Malathi Carroll,    Your labs look good only concern I have is your glucose levels please reduce your carbohydrate and food intake of sweets. If you have any questions let me know

## 2022-03-18 PROBLEM — E66.812 OBESITY, CLASS II, BMI 35-39.9: Status: ACTIVE | Noted: 2018-01-10

## 2022-03-18 PROBLEM — E66.9 OBESITY, CLASS II, BMI 35-39.9: Status: ACTIVE | Noted: 2018-01-10

## 2022-03-19 PROBLEM — R51.9 INTRACTABLE EPISODIC HEADACHE: Status: ACTIVE | Noted: 2018-01-10

## 2022-03-19 PROBLEM — I10 ESSENTIAL HYPERTENSION: Status: ACTIVE | Noted: 2018-01-10

## 2022-03-19 PROBLEM — C20 RECTAL CANCER (HCC): Status: ACTIVE | Noted: 2020-04-08

## 2022-03-20 PROBLEM — E66.01 SEVERE OBESITY (HCC): Status: ACTIVE | Noted: 2018-12-17

## 2022-06-08 DIAGNOSIS — G47.00 INSOMNIA, UNSPECIFIED TYPE: ICD-10-CM

## 2022-06-08 NOTE — TELEPHONE ENCOUNTER
Patient mychart request for refill zolpidem. Check . Thanks, Jacy Sheikh    Last Visit: 2/21/22 MD Swain  Next Appointment: 8/22/22 MD Swain  Previous Refill Encounter(s): 2/21/22 12    Requested Prescriptions     Pending Prescriptions Disp Refills    zolpidem (AMBIEN) 5 mg tablet 12 Tablet 0     Sig: Take 1 Tablet by mouth nightly as needed for Sleep. Max Daily Amount: 5 mg.        For Pharmacy Admin Tracking Only       Intervention Detail: New Rx: 1, reason: Patient Preference   Time Spent (min): 1

## 2022-06-09 RX ORDER — ZOLPIDEM TARTRATE 5 MG/1
5 TABLET ORAL
Qty: 12 TABLET | Refills: 0 | Status: SHIPPED | OUTPATIENT
Start: 2022-06-09 | End: 2022-08-22 | Stop reason: SDUPTHER

## 2022-07-26 ENCOUNTER — PATIENT MESSAGE (OUTPATIENT)
Dept: FAMILY MEDICINE CLINIC | Age: 52
End: 2022-07-26

## 2022-08-22 ENCOUNTER — OFFICE VISIT (OUTPATIENT)
Dept: FAMILY MEDICINE CLINIC | Age: 52
End: 2022-08-22
Payer: COMMERCIAL

## 2022-08-22 VITALS
RESPIRATION RATE: 16 BRPM | HEART RATE: 77 BPM | SYSTOLIC BLOOD PRESSURE: 108 MMHG | HEIGHT: 63 IN | TEMPERATURE: 97.3 F | DIASTOLIC BLOOD PRESSURE: 70 MMHG | WEIGHT: 221 LBS | BODY MASS INDEX: 39.16 KG/M2 | OXYGEN SATURATION: 97 %

## 2022-08-22 DIAGNOSIS — G47.00 INSOMNIA, UNSPECIFIED TYPE: ICD-10-CM

## 2022-08-22 DIAGNOSIS — E66.01 SEVERE OBESITY (BMI 35.0-39.9) WITH COMORBIDITY (HCC): ICD-10-CM

## 2022-08-22 DIAGNOSIS — I10 ESSENTIAL HYPERTENSION: Primary | ICD-10-CM

## 2022-08-22 DIAGNOSIS — Z12.31 ENCOUNTER FOR SCREENING MAMMOGRAM FOR MALIGNANT NEOPLASM OF BREAST: ICD-10-CM

## 2022-08-22 DIAGNOSIS — E78.2 MIXED HYPERLIPIDEMIA: ICD-10-CM

## 2022-08-22 DIAGNOSIS — C20 RECTAL CANCER (HCC): ICD-10-CM

## 2022-08-22 LAB
ALBUMIN SERPL-MCNC: 3.6 G/DL (ref 3.5–5)
ALBUMIN/GLOB SERPL: 0.9 {RATIO} (ref 1.1–2.2)
ALP SERPL-CCNC: 108 U/L (ref 45–117)
ALT SERPL-CCNC: 31 U/L (ref 12–78)
ANION GAP SERPL CALC-SCNC: 5 MMOL/L (ref 5–15)
AST SERPL-CCNC: 17 U/L (ref 15–37)
BASOPHILS # BLD: 0 K/UL (ref 0–0.1)
BASOPHILS NFR BLD: 1 % (ref 0–1)
BILIRUB SERPL-MCNC: 0.4 MG/DL (ref 0.2–1)
BUN SERPL-MCNC: 14 MG/DL (ref 6–20)
BUN/CREAT SERPL: 13 (ref 12–20)
CALCIUM SERPL-MCNC: 9.2 MG/DL (ref 8.5–10.1)
CHLORIDE SERPL-SCNC: 108 MMOL/L (ref 97–108)
CHOLEST SERPL-MCNC: 214 MG/DL
CO2 SERPL-SCNC: 28 MMOL/L (ref 21–32)
CREAT SERPL-MCNC: 1.04 MG/DL (ref 0.55–1.02)
DIFFERENTIAL METHOD BLD: ABNORMAL
EOSINOPHIL # BLD: 0.1 K/UL (ref 0–0.4)
EOSINOPHIL NFR BLD: 1 % (ref 0–7)
ERYTHROCYTE [DISTWIDTH] IN BLOOD BY AUTOMATED COUNT: 12.3 % (ref 11.5–14.5)
GLOBULIN SER CALC-MCNC: 3.8 G/DL (ref 2–4)
GLUCOSE SERPL-MCNC: 114 MG/DL (ref 65–100)
HCT VFR BLD AUTO: 39.6 % (ref 35–47)
HDLC SERPL-MCNC: 46 MG/DL
HDLC SERPL: 4.7 {RATIO} (ref 0–5)
HGB BLD-MCNC: 12.8 G/DL (ref 11.5–16)
IMM GRANULOCYTES # BLD AUTO: 0 K/UL (ref 0–0.04)
IMM GRANULOCYTES NFR BLD AUTO: 0 % (ref 0–0.5)
LDLC SERPL CALC-MCNC: 139.4 MG/DL (ref 0–100)
LYMPHOCYTES # BLD: 3.3 K/UL (ref 0.8–3.5)
LYMPHOCYTES NFR BLD: 50 % (ref 12–49)
MCH RBC QN AUTO: 31.4 PG (ref 26–34)
MCHC RBC AUTO-ENTMCNC: 32.3 G/DL (ref 30–36.5)
MCV RBC AUTO: 97.3 FL (ref 80–99)
MONOCYTES # BLD: 0.6 K/UL (ref 0–1)
MONOCYTES NFR BLD: 9 % (ref 5–13)
NEUTS SEG # BLD: 2.5 K/UL (ref 1.8–8)
NEUTS SEG NFR BLD: 39 % (ref 32–75)
NRBC # BLD: 0 K/UL (ref 0–0.01)
NRBC BLD-RTO: 0 PER 100 WBC
PLATELET # BLD AUTO: 332 K/UL (ref 150–400)
PMV BLD AUTO: 9.8 FL (ref 8.9–12.9)
POTASSIUM SERPL-SCNC: 4.4 MMOL/L (ref 3.5–5.1)
PROT SERPL-MCNC: 7.4 G/DL (ref 6.4–8.2)
RBC # BLD AUTO: 4.07 M/UL (ref 3.8–5.2)
SODIUM SERPL-SCNC: 141 MMOL/L (ref 136–145)
TRIGL SERPL-MCNC: 143 MG/DL (ref ?–150)
VLDLC SERPL CALC-MCNC: 28.6 MG/DL
WBC # BLD AUTO: 6.5 K/UL (ref 3.6–11)

## 2022-08-22 PROCEDURE — 99214 OFFICE O/P EST MOD 30 MIN: CPT | Performed by: FAMILY MEDICINE

## 2022-08-22 RX ORDER — ZOLPIDEM TARTRATE 5 MG/1
5 TABLET ORAL
Qty: 12 TABLET | Refills: 0 | Status: SHIPPED | OUTPATIENT
Start: 2022-08-22

## 2022-08-22 NOTE — PROGRESS NOTES
Chief Complaint   Patient presents with    Hypertension    Cholesterol Problem     1. \"Have you been to the ER, urgent care clinic since your last visit? Hospitalized since your last visit? \" no    2. \"Have you seen or consulted any other health care providers outside of the [address] [place] [address] since your last visit? \"  no    3. For patients aged 39-70: Has the patient had a colonoscopy / FIT/ Cologuard? Has gap last year in September [address] [facility]     If the patient is female:    4. For patients aged 41-77: Has the patient had a mammogram within the past 2 years? Has gap   Not done     5. For patients aged 21-65: Has the patient had a pap smear?  Has gap not done

## 2022-08-22 NOTE — PROGRESS NOTES
HPI  Shasta Wilcox 47 y.o. female  presents to the office today for a follow up on hypertension and cholesterol. Blood pressure 108/70, pulse 77, temperature 97.3 °F (36.3 °C), temperature source Temporal, resp. rate 16, height 5' 3\" (1.6 m), weight 221 lb (100.2 kg), last menstrual period 04/23/2020, SpO2 97 %. Body mass index is 39.15 kg/m². Chief Complaint   Patient presents with    Hypertension    Cholesterol Problem      Hyperlipidemia: Lipid panel on 2/21/22 notable for total cholesterol 190, HDL 43, .8, and triglycerides 116. Pt continues with Lipitor 40mg daily. Insomnia: Pt has a MHx of insomnia and is currently on Ambien 5mg daily as needed for sleep. Hypertension: BP at office today 108/70. Pt continues with HCTZ 12.5mg daily. Rectal Cancer: Pt has a PMHx of Rectal Cancer. She is currently cancer free. She was seen by Annie Spearsty and Rectal Surgery and , Gastroenterology, for Colon Cancer Screening. Health Maintenance:   Pt had colonoscopy in Sep 2021 with Dr. Brit Diamond. I will have my nurse request these records. Pt had COVID vaccines on 4/2/21 ([employer]) and 4/23/21 ([employer]). Pt advised to get booster. Pt had second shingles vaccine around Feb 2021. Pt due for mammogram. I have placed an ordered for this. Pt had pap around Jan 2022 with Dr. Luis Armando Benson, GYN. I will have my nurse request these records. Current Outpatient Medications   Medication Sig Dispense Refill    zolpidem (AMBIEN) 5 mg tablet Take 1 Tablet by mouth nightly as needed for Sleep.  Max Daily Amount: 5 mg. 12 Tablet 0    hydroCHLOROthiazide (HYDRODIURIL) 12.5 mg tablet TAKE 1 TABLET DAILY 90 Tablet 1    atorvastatin (LIPITOR) 40 mg tablet TAKE 1 TABLET DAILY (NEED  OFFICE VISIT FOR FUTURE    MEDICATION REFILL) 90 Tablet 1     Allergies   Allergen Reactions    Percocet [Oxycodone-Acetaminophen] Itching     No longer an allergy     Past Medical History:   Diagnosis Date    Diverticulosis Fibroids     Uterine fibroids    Hypercholesterolemia     Hypertension     Obesity (BMI 35.0-39.9 without comorbidity)     Rectal cancer [facility])      Past Surgical History:   Procedure Laterality Date    FLEXIBLE SIGMOIDOSCOPY N/A 2020    Flexible sigmoidoscopy and rectal endoscopic ultrasound; Maria A Guerrero MD    HX  SECTION  2006    HX COLONOSCOPY  2020    Elliot Frazier MD    HX GYN  2003    Uterine myomectomy for fibroids; Rowan Brown MD    HX LAP CHOLECYSTECTOMY      HX ORTHOPAEDIC Left 2005    ORIF ANKLE    HX OTHER SURGICAL  2020    Hand-assisted laparoscopic low anterior resection, mobilization of the splenic flexure, and coloproctostomy; Dr. Nahomy Archibald. HX UROLOGICAL  2020    Cystourethroscopy and placement of temporary bilateral ureteral catheters; Nayeli Hancock MD.     Family History   Problem Relation Age of Onset    Asthma Mother     Elevated Lipids Father     Hypertension Father     Cancer Father         anal cancer    Diabetes Paternal Aunt     Cancer Maternal Grandmother         colon    Cancer Paternal Grandmother         leukemia    Anesth Problems Neg Hx      Social History     Tobacco Use    Smoking status: Former     Types: Cigarettes     Quit date: 2013     Years since quittin.6    Smokeless tobacco: Never    Tobacco comments:     ONE PACK/WEEK   Substance Use Topics    Alcohol use: Yes     Comment: occassional        Review of Systems   Constitutional:  Negative for chills and fever. HENT:  Negative for hearing loss and tinnitus. Eyes:  Negative for blurred vision and double vision. Respiratory:  Negative for cough and shortness of breath. Cardiovascular:  Negative for chest pain and palpitations. Gastrointestinal:  Negative for nausea and vomiting. Genitourinary:  Negative for dysuria and frequency. Musculoskeletal:  Negative for back pain and falls. Skin:  Negative for itching and rash.    Neurological:  Negative for dizziness, loss of consciousness and headaches. Endo/Heme/Allergies: Negative. Psychiatric/Behavioral:  Negative for depression. The patient is not nervous/anxious. Physical Exam  Vitals and nursing note reviewed. Constitutional:       General: She is not in acute distress. Appearance: She is well-developed. She is not diaphoretic. HENT:      Head: Normocephalic and atraumatic. Cardiovascular:      Rate and Rhythm: Normal rate and regular rhythm. Heart sounds: Normal heart sounds. No murmur heard. No friction rub. No gallop. Pulmonary:      Effort: Pulmonary effort is normal. No respiratory distress. Breath sounds: Normal breath sounds. No wheezing or rales. Chest:      Chest wall: No tenderness. Abdominal:      General: Bowel sounds are normal. There is no distension. Palpations: Abdomen is soft. Tenderness: There is no abdominal tenderness. Musculoskeletal:         General: Normal range of motion. Cervical back: Normal range of motion and neck supple. Neurological:      Mental Status: She is alert and oriented to person, place, and time. Psychiatric:         Behavior: Behavior normal.         Thought Content: Thought content normal.         Judgment: Judgment normal.       ASSESSMENT and PLAN  Diagnoses and all orders for this visit:    1. Essential hypertension  BP at office today 108/70. Pt continues with HCTZ 12.5mg daily. Pt requests a refill of their medication, which I have granted. -     METABOLIC PANEL, COMPREHENSIVE; Future  -     CBC WITH AUTOMATED DIFF; Future    2. Mixed hyperlipidemia: Lipid panel on 2/21/22 notable for total cholesterol 190, HDL 43, .8, and triglycerides 116. Pt continues with Lipitor 40mg daily. Pt requests a refill of their medication, which I have granted. -     METABOLIC PANEL, COMPREHENSIVE; Future  -     LIPID PANEL; Future    3. Severe obesity (BMI 35.0-39. 9) with comorbidity ([facility] [address]  I have reviewed/discussed the above normal BMI with the patient. I have recommended the following interventions: dietary management education, guidance, and counseling, encourage exercise and monitor weight. 4. Encounter for screening mammogram for malignant neoplasm of breast  Pt is due for an updated mammogram; orders have been placed for this imaging to be performed. -     ERMA MAMMO BI SCREENING INCL CAD; Future    5. Rectal cancer ([address]  Pt has a PMHx of Rectal Cancer. She is currently cancer free. She was seen by Redding Marker and Rectal Surgery and , Gastroenterology, for Colon Cancer Screening. Pt had colonoscopy in Sep 2021 with Dr. Darin Gomez. I will have my nurse request these records. 6. Insomnia, unspecified type  Pt has a MHx of insomnia and is currently on Ambien 5mg daily as needed for sleep. Pt requests a refill of their medication, which I have granted. -     zolpidem (AMBIEN) 5 mg tablet; Take 1 Tablet by mouth nightly as needed for Sleep. Max Daily Amount: 5 mg. Pt will follow up in 6 months for CPE. Medication risks/benefits/costs/interactions/alternatives discussed with patient. Advised patient to call back or return to office if symptoms worsen/change/persist.  If patient cannot reach us or should anything more severe/urgent arise he/she should proceed directly to the nearest emergency department. Discussed expected course/resolution/complications of diagnosis in detail with patient. Patient given a written after visit summary which includes diagnoses, current medications and vitals. Patient expressed understanding with the diagnosis and plan. Written by fermín Bentley, as dictated by Harmony Petersen M.D.    8:13 AM- 8:30 AM    Total time spent with the patient 15 minutes, greater than 50% of time spent counseling patient.

## 2022-08-23 RX ORDER — ATORVASTATIN CALCIUM 40 MG/1
TABLET, FILM COATED ORAL
Qty: 90 TABLET | Refills: 1 | Status: SHIPPED | OUTPATIENT
Start: 2022-08-23

## 2022-10-11 DIAGNOSIS — I10 ESSENTIAL HYPERTENSION: ICD-10-CM

## 2022-10-12 RX ORDER — HYDROCHLOROTHIAZIDE 12.5 MG/1
TABLET ORAL
Qty: 90 TABLET | Refills: 1 | Status: SHIPPED | OUTPATIENT
Start: 2022-10-12

## 2022-11-20 DIAGNOSIS — G47.00 INSOMNIA, UNSPECIFIED TYPE: ICD-10-CM

## 2022-11-22 RX ORDER — ZOLPIDEM TARTRATE 5 MG/1
TABLET ORAL
Qty: 12 TABLET | Refills: 0 | Status: SHIPPED | OUTPATIENT
Start: 2022-11-22

## 2023-02-20 NOTE — PROGRESS NOTES
HPI  Mayur Vu 47 y.o. female  presents to the office today for follow up on chronic conditions. Blood pressure 100/80, pulse 78, temperature 97.2 °F (36.2 °C), temperature source Temporal, resp. rate 16, height 5' 3\" (1.6 m), weight 220 lb (99.8 kg), last menstrual period 2020, SpO2 97 %. Body mass index is 38.97 kg/m². Chief Complaint   Patient presents with    Hypertension    Cholesterol Problem      Hypertension: BP at office today 100/80 on manual recheck. Pt takes HCTZ 12.5mg daily. Hyperlipidemia: Lipid panel on 22 notable for total cholesterol 214, HDL 46, .4, and triglycerides 143. Pt takes Lipitor 40mg daily. Insomnia: Pt has a MHx of insomnia and is currently on Ambien 5mg daily as needed for sleep. Rectal Cancer: Pt has a PMHx of Rectal Cancer. She is followed by Dr. Archana Christianson and Rectal Surgery and Dr. Irma Good, Gastroenterology.      Current Outpatient Medications   Medication Sig Dispense Refill    zolpidem (AMBIEN) 5 mg tablet TAKE 1 TABLET NIGHTLY AS   NEEDED FOR SLEEP, MAXIMUM  DAILY AMOUNT:5MG 30 Tablet 0    atorvastatin (LIPITOR) 40 mg tablet TAKE 1 TABLET DAILY 90 Tablet 1    hydroCHLOROthiazide (HYDRODIURIL) 12.5 mg tablet TAKE 1 TABLET DAILY 90 Tablet 1     Allergies   Allergen Reactions    Percocet [Oxycodone-Acetaminophen] Itching     No longer an allergy     Past Medical History:   Diagnosis Date    Diverticulosis     Fibroids     Uterine fibroids    Hypercholesterolemia     Hypertension     Menopause     Obesity (BMI 35.0-39.9 without comorbidity)     Rectal cancer (HCC)      Past Surgical History:   Procedure Laterality Date    FLEXIBLE SIGMOIDOSCOPY N/A 2020    Flexible sigmoidoscopy and rectal endoscopic ultrasound; Jaylan Ramon MD    HX  SECTION  2006    HX COLONOSCOPY  2020    Jesus London MD    HX GYN  2003    Uterine myomectomy for fibroids; Babs Mace MD    HX LAP CHOLECYSTECTOMY      HX ORTHOPAEDIC Left 2005    ORIF ANKLE    HX OTHER SURGICAL  05/07/2020    Hand-assisted laparoscopic low anterior resection, mobilization of the splenic flexure, and coloproctostomy; Dr. Vanessa Engel. HX UROLOGICAL  05/07/2020    Cystourethroscopy and placement of temporary bilateral ureteral catheters; Alejo Murcia MD.     Family History   Problem Relation Age of Onset    Asthma Mother     Elevated Lipids Father     Hypertension Father     Cancer Father         anal cancer    Diabetes Paternal Aunt     Cancer Maternal Grandmother         colon    Cancer Paternal Grandmother         leukemia    Anesth Problems Neg Hx      Social History     Tobacco Use    Smoking status: Former     Types: Cigarettes     Quit date: 1/13/2013     Years since quitting: 10.1    Smokeless tobacco: Never    Tobacco comments:     ONE PACK/WEEK   Substance Use Topics    Alcohol use: Yes     Comment: occassional        Review of Systems   Constitutional:  Negative for chills and fever. HENT:  Negative for hearing loss and tinnitus. Eyes:  Negative for blurred vision and double vision. Respiratory:  Negative for cough and shortness of breath. Cardiovascular:  Negative for chest pain and palpitations. Gastrointestinal:  Negative for nausea and vomiting. Genitourinary:  Negative for dysuria and frequency. Musculoskeletal:  Negative for back pain and falls. Skin:  Negative for itching and rash. Neurological:  Negative for dizziness, loss of consciousness and headaches. Endo/Heme/Allergies: Negative. Psychiatric/Behavioral:  Negative for depression. The patient is not nervous/anxious. Physical Exam  Vitals and nursing note reviewed. Constitutional:       General: She is not in acute distress. Appearance: She is well-developed. She is not diaphoretic. HENT:      Head: Normocephalic and atraumatic. Cardiovascular:      Rate and Rhythm: Normal rate and regular rhythm. Heart sounds: Normal heart sounds. No murmur heard. No friction rub. No gallop. Pulmonary:      Effort: Pulmonary effort is normal. No respiratory distress. Breath sounds: Normal breath sounds. No wheezing or rales. Chest:      Chest wall: No tenderness. Abdominal:      General: Bowel sounds are normal. There is no distension. Palpations: Abdomen is soft. Tenderness: There is no abdominal tenderness. Musculoskeletal:         General: Normal range of motion. Cervical back: Normal range of motion and neck supple. Neurological:      Mental Status: She is alert and oriented to person, place, and time. Psychiatric:         Behavior: Behavior normal.         Thought Content: Thought content normal.         Judgment: Judgment normal.         ASSESSMENT and PLAN  Diagnoses and all orders for this visit:    1. Essential hypertension  Blood pressure is at goal continue current regimen  -     METABOLIC PANEL, COMPREHENSIVE; Future    2. Mixed hyperlipidemia  Cholesterol panel is presumed stable continue current regimen  -     METABOLIC PANEL, COMPREHENSIVE; Future  -     LIPID PANEL; Future    3. Insomnia, unspecified type  We discussed about the medication Ambien in detail. I advised patient not to take more than 2 tablets in a week. 30 tablets should last 2 to 3 months.  -     zolpidem (AMBIEN) 5 mg tablet; TAKE 1 TABLET NIGHTLY AS   NEEDED FOR SLEEP, MAXIMUM  DAILY AMOUNT:5MG    4. Rectal cancer [facility])  Assessment & Plan:   monitored by specialist. No acute findings meriting change in the plan      5. Severe obesity (BMI 35.0-39. 9) with comorbidity [facility])  We discussed about Wegovy and Saxenda as options. We also discussed about Vyvanse or Concerta for binge eating. Patient will call her insurance company to verify which if it is covered or not    6. Binge eating  We discussed about Vyvanse and Concerta and the injectables.   I had discussed with patient that prior authorizations are probably needed but to find out if there is any coverage for these medications prior to sending it out to the pharmacy        Follow-up and Dispositions    Return in about 6 months (around 8/27/2023) for hypertension follow up, hyperlipidemia follow up. Medication risks/benefits/costs/interactions/alternatives discussed with patient. Advised patient to call back or return to office if symptoms worsen/change/persist.  If patient cannot reach us or should anything more severe/urgent arise he/she should proceed directly to the nearest emergency department. Discussed expected course/resolution/complications of diagnosis in detail with patient. Patient given a written after visit summary which includes diagnoses, current medications and vitals. Patient expressed understanding with the diagnosis and plan.

## 2023-02-24 ENCOUNTER — HOSPITAL ENCOUNTER (OUTPATIENT)
Dept: MAMMOGRAPHY | Age: 53
Discharge: HOME | End: 2023-02-24
Attending: FAMILY MEDICINE
Payer: COMMERCIAL

## 2023-02-24 DIAGNOSIS — Z12.31 ENCOUNTER FOR SCREENING MAMMOGRAM FOR MALIGNANT NEOPLASM OF BREAST: ICD-10-CM

## 2023-02-24 PROCEDURE — 77067 SCR MAMMO BI INCL CAD: CPT

## 2023-02-27 ENCOUNTER — OFFICE VISIT (OUTPATIENT)
Dept: FAMILY MEDICINE CLINIC | Age: 53
End: 2023-02-27
Payer: COMMERCIAL

## 2023-02-27 VITALS
HEIGHT: 63 IN | RESPIRATION RATE: 16 BRPM | DIASTOLIC BLOOD PRESSURE: 80 MMHG | HEART RATE: 78 BPM | OXYGEN SATURATION: 97 % | SYSTOLIC BLOOD PRESSURE: 100 MMHG | TEMPERATURE: 97.2 F | WEIGHT: 220 LBS | BODY MASS INDEX: 38.98 KG/M2

## 2023-02-27 DIAGNOSIS — C20 RECTAL CANCER (HCC): ICD-10-CM

## 2023-02-27 DIAGNOSIS — I10 ESSENTIAL HYPERTENSION: Primary | ICD-10-CM

## 2023-02-27 DIAGNOSIS — G47.00 INSOMNIA, UNSPECIFIED TYPE: ICD-10-CM

## 2023-02-27 DIAGNOSIS — R63.2 BINGE EATING: ICD-10-CM

## 2023-02-27 DIAGNOSIS — E78.2 MIXED HYPERLIPIDEMIA: ICD-10-CM

## 2023-02-27 DIAGNOSIS — E66.01 SEVERE OBESITY (BMI 35.0-39.9) WITH COMORBIDITY (HCC): ICD-10-CM

## 2023-02-27 LAB
ALBUMIN SERPL-MCNC: 4 G/DL (ref 3.5–5)
ALBUMIN/GLOB SERPL: 1 (ref 1.1–2.2)
ALP SERPL-CCNC: 118 U/L (ref 45–117)
ALT SERPL-CCNC: 31 U/L (ref 12–78)
ANION GAP SERPL CALC-SCNC: 5 MMOL/L (ref 5–15)
AST SERPL-CCNC: 20 U/L (ref 15–37)
BILIRUB SERPL-MCNC: 0.5 MG/DL (ref 0.2–1)
BUN SERPL-MCNC: 16 MG/DL (ref 6–20)
BUN/CREAT SERPL: 15 (ref 12–20)
CALCIUM SERPL-MCNC: 9.5 MG/DL (ref 8.5–10.1)
CHLORIDE SERPL-SCNC: 104 MMOL/L (ref 97–108)
CHOLEST SERPL-MCNC: 226 MG/DL
CO2 SERPL-SCNC: 31 MMOL/L (ref 21–32)
CREAT SERPL-MCNC: 1.07 MG/DL (ref 0.55–1.02)
GLOBULIN SER CALC-MCNC: 4 G/DL (ref 2–4)
GLUCOSE SERPL-MCNC: 102 MG/DL (ref 65–100)
HDLC SERPL-MCNC: 42 MG/DL
HDLC SERPL: 5.4 (ref 0–5)
LDLC SERPL CALC-MCNC: 159.4 MG/DL (ref 0–100)
POTASSIUM SERPL-SCNC: 3.9 MMOL/L (ref 3.5–5.1)
PROT SERPL-MCNC: 8 G/DL (ref 6.4–8.2)
SODIUM SERPL-SCNC: 140 MMOL/L (ref 136–145)
TRIGL SERPL-MCNC: 123 MG/DL (ref ?–150)
VLDLC SERPL CALC-MCNC: 24.6 MG/DL

## 2023-02-27 PROCEDURE — 3079F DIAST BP 80-89 MM HG: CPT | Performed by: FAMILY MEDICINE

## 2023-02-27 PROCEDURE — 99214 OFFICE O/P EST MOD 30 MIN: CPT | Performed by: FAMILY MEDICINE

## 2023-02-27 PROCEDURE — 3074F SYST BP LT 130 MM HG: CPT | Performed by: FAMILY MEDICINE

## 2023-02-27 RX ORDER — ZOLPIDEM TARTRATE 5 MG/1
TABLET ORAL
Qty: 30 TABLET | Refills: 0 | Status: SHIPPED | OUTPATIENT
Start: 2023-02-27

## 2023-02-27 NOTE — PROGRESS NOTES
Chief Complaint   Patient presents with    Hypertension    Cholesterol Problem     1. \"Have you been to the ER, urgent care clinic since your last visit? Hospitalized since your last visit? \" no    2. \"Have you seen or consulted any other health care providers outside of the [address] [place] [address] since your last visit? \"  Mammogram     3. For patients aged 39-70: Has the patient had a colonoscopy / FIT/ Cologuard? No due in September 2023       5. For patients aged 21-65: Has the patient had a pap smear?  Dr. Jon Jean   Yes

## 2023-03-01 RX ORDER — SEMAGLUTIDE 0.25 MG/.5ML
0.25 INJECTION, SOLUTION SUBCUTANEOUS
Qty: 5 EACH | Refills: 0 | Status: SHIPPED | OUTPATIENT
Start: 2023-03-01

## 2023-03-02 ENCOUNTER — TELEPHONE (OUTPATIENT)
Dept: FAMILY MEDICINE CLINIC | Age: 53
End: 2023-03-02

## 2023-05-23 ENCOUNTER — TELEMEDICINE (OUTPATIENT)
Age: 53
End: 2023-05-23
Payer: COMMERCIAL

## 2023-05-23 DIAGNOSIS — E66.01 MORBID (SEVERE) OBESITY DUE TO EXCESS CALORIES (HCC): ICD-10-CM

## 2023-05-23 DIAGNOSIS — R73.09 ELEVATED GLUCOSE: Primary | ICD-10-CM

## 2023-05-23 PROCEDURE — 99213 OFFICE O/P EST LOW 20 MIN: CPT | Performed by: FAMILY MEDICINE

## 2023-05-23 ASSESSMENT — ENCOUNTER SYMPTOMS
SHORTNESS OF BREATH: 0
ABDOMINAL PAIN: 0

## 2023-05-23 NOTE — PROGRESS NOTES
Estuardo Lowe (:  1970) is a Established patient, presenting virtually for evaluation of the following: weight managment    Assessment & Plan   Below is the assessment and plan developed based on review of pertinent history, physical exam, labs, studies, and medications. 1. Elevated glucose  -     Hemoglobin A1C; Future  2. Morbid (severe) obesity due to excess calories ([address]  Encouraged to work on diet and exercise and continue weight watchers and to get further exercise as she has been doing discussed with her that we will try to get labs and consider getting her the injectable weight loss medications. Return in about 2 months (around 2023) for weight follow up. Subjective   HPI  Patient presents today for update on weight loss. She has lost 5 pounds since I have last seen her. She has been working on an exercise plan she gets 5-10,000 steps a day. She is also started weight watchers and has been watching her caloric intake. She states that 5 pounds is just happened in the past month. But we also did notice that she does have elevated blood sugar levels. I advised her that we needed to get a hemoglobin A1c and dependent upon that we may be able to go ahead and get her on Wegovy or Saxenda to help her for further weight loss. Patient will come in this week and have blood work done. Review of Systems   Constitutional:  Negative for activity change, appetite change and fatigue. Eyes:  Negative for visual disturbance. Respiratory:  Negative for shortness of breath. Cardiovascular:  Negative for chest pain. Gastrointestinal:  Negative for abdominal pain. Genitourinary:  Negative for difficulty urinating and dysuria. Musculoskeletal:  Negative for arthralgias. Neurological:  Negative for dizziness and headaches. Psychiatric/Behavioral:  Negative for dysphoric mood. The patient is not nervous/anxious.          Objective   Patient-Reported Vitals  Patient-Reported

## 2023-05-25 DIAGNOSIS — R73.09 ELEVATED GLUCOSE: ICD-10-CM

## 2023-05-26 LAB
EST. AVERAGE GLUCOSE BLD GHB EST-MCNC: 148 MG/DL
HBA1C MFR BLD: 6.8 % (ref 4–5.6)

## 2023-06-01 ENCOUNTER — PATIENT MESSAGE (OUTPATIENT)
Age: 53
End: 2023-06-01

## 2023-06-01 RX ORDER — SEMAGLUTIDE 0.25 MG/.5ML
INJECTION, SOLUTION SUBCUTANEOUS
Status: CANCELLED | OUTPATIENT
Start: 2023-06-01

## 2023-06-01 NOTE — TELEPHONE ENCOUNTER
From: Constantin Rushing  To: Dr. Ta Gutierrez: 6/1/2023 10:40 AM EDT  Subject: A1C    Hi Dr. Ivan Carson,    I did receive your letter regarding my test results. I need you to submit the prescription for Felecia Christie because it was initially denied and you had be take the A1C test as proof for approval. Did you send the request to my mail order pharmacy?      Thanks Constantin Rushing  [phone]

## 2023-06-02 NOTE — TELEPHONE ENCOUNTER
Prior Auth was denied apparently they will only pay if weight loss is the only reason for treatment I used weight loss and Diabetes   Filed appeal awaiting response.

## 2023-07-11 ENCOUNTER — CLINICAL DOCUMENTATION (OUTPATIENT)
Age: 53
End: 2023-07-11

## 2023-07-14 SDOH — HEALTH STABILITY: PHYSICAL HEALTH: ON AVERAGE, HOW MANY MINUTES DO YOU ENGAGE IN EXERCISE AT THIS LEVEL?: 0 MIN

## 2023-07-14 SDOH — HEALTH STABILITY: PHYSICAL HEALTH: ON AVERAGE, HOW MANY DAYS PER WEEK DO YOU ENGAGE IN MODERATE TO STRENUOUS EXERCISE (LIKE A BRISK WALK)?: 0 DAYS

## 2023-07-17 ENCOUNTER — OFFICE VISIT (OUTPATIENT)
Facility: CLINIC | Age: 53
End: 2023-07-17
Payer: COMMERCIAL

## 2023-07-17 VITALS
TEMPERATURE: 98 F | HEART RATE: 79 BPM | DIASTOLIC BLOOD PRESSURE: 70 MMHG | HEIGHT: 63 IN | SYSTOLIC BLOOD PRESSURE: 100 MMHG | OXYGEN SATURATION: 98 % | WEIGHT: 215 LBS | RESPIRATION RATE: 16 BRPM | BODY MASS INDEX: 38.09 KG/M2

## 2023-07-17 DIAGNOSIS — M18.11 ARTHRITIS OF CARPOMETACARPAL (CMC) JOINT OF RIGHT THUMB: Primary | ICD-10-CM

## 2023-07-17 DIAGNOSIS — G56.01 CARPAL TUNNEL SYNDROME OF RIGHT WRIST: ICD-10-CM

## 2023-07-17 PROCEDURE — 3078F DIAST BP <80 MM HG: CPT | Performed by: FAMILY MEDICINE

## 2023-07-17 PROCEDURE — 20600 DRAIN/INJ JOINT/BURSA W/O US: CPT | Performed by: FAMILY MEDICINE

## 2023-07-17 PROCEDURE — 3074F SYST BP LT 130 MM HG: CPT | Performed by: FAMILY MEDICINE

## 2023-07-17 PROCEDURE — 99214 OFFICE O/P EST MOD 30 MIN: CPT | Performed by: FAMILY MEDICINE

## 2023-07-17 RX ORDER — TRIAMCINOLONE ACETONIDE 40 MG/ML
40 INJECTION, SUSPENSION INTRA-ARTICULAR; INTRAMUSCULAR ONCE
Status: COMPLETED | OUTPATIENT
Start: 2023-07-17 | End: 2023-07-17

## 2023-07-17 RX ADMIN — TRIAMCINOLONE ACETONIDE 40 MG: 40 INJECTION, SUSPENSION INTRA-ARTICULAR; INTRAMUSCULAR at 10:45

## 2023-07-17 NOTE — PROGRESS NOTES
Chief Complaint   Patient presents with    Wrist Pain     Patient is here for right wrist pain      she is a 46y.o. year old female who presents for evaluation of right wrist   Pain Assessment Encounter      Aiyana Cunninghamliliane  7/17/2023  Onset of Symptoms: Patient states she has had pain for months   ________________________________________________________________________  Description:Patient states no injures     Frequency: 5 times a day  Pain Scale:(1-10): 4  Trauma Hx: none  Hx of similar symptoms: Yes  Radiation: Yes, hand and fingers   Duration:  continuous      Progression: has worsened  What makes it better?: heat, ice, and OTC meds  What makes it worse?:strecthing  Medications tried: acetaminophen    Reviewed and agree with Nurse Note and duplicated in this note. Reviewed PmHx, RxHx, FmHx, SocHx, AllgHx and updated and dated in the chart.     Family History   Problem Relation Age of Onset    Anesth Problems Neg Hx     Cancer Paternal Grandmother         leukemia    Cancer Maternal Grandmother         colon    Diabetes Paternal Aunt     Cancer Father         anal cancer    Hypertension Father     Asthma Mother     Elevated Lipids Father        Past Medical History:   Diagnosis Date    Diverticulosis     Fibroids     Uterine fibroids    Hypercholesterolemia     Hypertension     Menopause     Obesity (BMI 35.0-39.9 without comorbidity)     Rectal cancer ([address])       Social History     Socioeconomic History    Marital status:    Tobacco Use    Smoking status: Former     Types: Cigarettes     Quit date: 1/13/2013     Years since quitting: 10.5    Smokeless tobacco: Never   Substance and Sexual Activity    Alcohol use: Yes    Drug use: No     Social Determinants of Health     Financial Resource Strain: Unknown    Difficulty of Paying Living Expenses: Patient refused   Food Insecurity: Unknown    Worried About Running Out of Food in the Last Year: Patient refused    [address] in the Last Year:

## 2023-08-24 ENCOUNTER — OFFICE VISIT (OUTPATIENT)
Facility: CLINIC | Age: 53
End: 2023-08-24
Payer: COMMERCIAL

## 2023-08-24 VITALS
BODY MASS INDEX: 37.39 KG/M2 | SYSTOLIC BLOOD PRESSURE: 109 MMHG | WEIGHT: 211 LBS | HEART RATE: 87 BPM | OXYGEN SATURATION: 96 % | HEIGHT: 63 IN | RESPIRATION RATE: 16 BRPM | DIASTOLIC BLOOD PRESSURE: 77 MMHG | TEMPERATURE: 98 F

## 2023-08-24 DIAGNOSIS — M18.11 ARTHRITIS OF CARPOMETACARPAL (CMC) JOINT OF RIGHT THUMB: Primary | ICD-10-CM

## 2023-08-24 DIAGNOSIS — G56.01 CARPAL TUNNEL SYNDROME OF RIGHT WRIST: ICD-10-CM

## 2023-08-24 PROCEDURE — 99213 OFFICE O/P EST LOW 20 MIN: CPT | Performed by: FAMILY MEDICINE

## 2023-08-24 PROCEDURE — 3074F SYST BP LT 130 MM HG: CPT | Performed by: FAMILY MEDICINE

## 2023-08-24 PROCEDURE — 3078F DIAST BP <80 MM HG: CPT | Performed by: FAMILY MEDICINE

## 2023-08-24 NOTE — PROGRESS NOTES
Provided reassurance   Recommend activity modification   Recommend  lower impact activities-walking, Eliptical, Nordic Track, cycling or swimming               I have discussed the diagnosis with the patient and the intended plan as seen in the above orders. The patient has received an after-visit summary and questions were answered concerning future plans. Medication Side Effects and Warnings were discussed with patient,  Patient Labs were reviewed and or requested, and  Patient Past Records were reviewed and or requested  yes     Pt agrees to call or return to clinic and/or go to closest ER with any worsening of symptoms. This may include, but not limited to increased fever (>100.4) with NSAIDS or Tylenol, increased edema, confusion, rash, worsening of presenting symptoms. Please note that this dictation was completed with [employer]Ray, the computer voice recognition software. Quite often unanticipated grammatical, syntax, homophones, and other interpretive errors are inadvertently transcribed by the computer software. Please disregard these errors. Please excuse any errors that have escaped final proofreading. Thank you.

## 2023-10-06 RX ORDER — HYDROCHLOROTHIAZIDE 12.5 MG/1
12.5 TABLET ORAL DAILY
Qty: 90 TABLET | Refills: 1 | Status: SHIPPED | OUTPATIENT
Start: 2023-10-06

## 2023-10-06 NOTE — TELEPHONE ENCOUNTER
Last appointment: VV 5/23/23 MD Davis  Next appointment: 10/23/23 Ryan  Previous refill encounter(s): 4/11/23 90 + 1    Requested Prescriptions     Pending Prescriptions Disp Refills    hydroCHLOROthiazide (HYDRODIURIL) 12.5 MG tablet 90 tablet 1     Sig: Take 1 tablet by mouth daily     For Pharmacy Admin Tracking Only    Program: Medication Refill  CPA in place:    Recommendation Provided To:    Intervention Detail: New Rx: 1, reason: Patient Preference  Intervention Accepted By:   Ninfa Echols Closed?:    Time Spent (min): 5

## 2023-10-13 DIAGNOSIS — F51.01 PRIMARY INSOMNIA: ICD-10-CM

## 2023-10-13 RX ORDER — HYDROCHLOROTHIAZIDE 12.5 MG/1
12.5 TABLET ORAL DAILY
Qty: 90 TABLET | Refills: 1 | Status: SHIPPED | OUTPATIENT
Start: 2023-10-13

## 2023-10-13 NOTE — TELEPHONE ENCOUNTER
From: Domenico Bob  To: Dr. Paolo Smith: 10/12/2023 11:10 AM EDT  Subject: Refill for Ambien    Can you please send a refill for Ambien to [facility].  [phone]. I have a new job and new insurance as well. Hopefully we can retry submitting a request for [facility]AKSHAT TORRES which was initially denied by [employer]. I now have [employer].   Thanks         Note         LOV 05/23/2023

## 2023-10-16 RX ORDER — ZOLPIDEM TARTRATE 5 MG/1
5 TABLET ORAL NIGHTLY PRN
Qty: 30 TABLET | Refills: 0 | Status: SHIPPED | OUTPATIENT
Start: 2023-10-16 | End: 2023-11-15

## 2023-10-21 SDOH — ECONOMIC STABILITY: FOOD INSECURITY: WITHIN THE PAST 12 MONTHS, YOU WORRIED THAT YOUR FOOD WOULD RUN OUT BEFORE YOU GOT MONEY TO BUY MORE.: NEVER TRUE

## 2023-10-21 SDOH — ECONOMIC STABILITY: INCOME INSECURITY: HOW HARD IS IT FOR YOU TO PAY FOR THE VERY BASICS LIKE FOOD, HOUSING, MEDICAL CARE, AND HEATING?: NOT VERY HARD

## 2023-10-21 SDOH — ECONOMIC STABILITY: HOUSING INSECURITY
IN THE LAST 12 MONTHS, WAS THERE A TIME WHEN YOU DID NOT HAVE A STEADY PLACE TO SLEEP OR SLEPT IN A SHELTER (INCLUDING NOW)?: NO

## 2023-10-21 SDOH — ECONOMIC STABILITY: TRANSPORTATION INSECURITY
IN THE PAST 12 MONTHS, HAS LACK OF TRANSPORTATION KEPT YOU FROM MEETINGS, WORK, OR FROM GETTING THINGS NEEDED FOR DAILY LIVING?: NO

## 2023-10-21 SDOH — ECONOMIC STABILITY: FOOD INSECURITY: WITHIN THE PAST 12 MONTHS, THE FOOD YOU BOUGHT JUST DIDN'T LAST AND YOU DIDN'T HAVE MONEY TO GET MORE.: NEVER TRUE

## 2023-10-23 ENCOUNTER — OFFICE VISIT (OUTPATIENT)
Age: 53
End: 2023-10-23
Payer: COMMERCIAL

## 2023-10-23 VITALS
HEIGHT: 63 IN | TEMPERATURE: 97.5 F | HEART RATE: 83 BPM | RESPIRATION RATE: 16 BRPM | WEIGHT: 217 LBS | DIASTOLIC BLOOD PRESSURE: 80 MMHG | BODY MASS INDEX: 38.45 KG/M2 | OXYGEN SATURATION: 99 % | SYSTOLIC BLOOD PRESSURE: 130 MMHG

## 2023-10-23 DIAGNOSIS — R73.09 ELEVATED GLUCOSE: ICD-10-CM

## 2023-10-23 DIAGNOSIS — E66.01 MORBID (SEVERE) OBESITY DUE TO EXCESS CALORIES (HCC): ICD-10-CM

## 2023-10-23 DIAGNOSIS — E78.2 MIXED HYPERLIPIDEMIA: ICD-10-CM

## 2023-10-23 DIAGNOSIS — Z23 FLU VACCINE NEED: ICD-10-CM

## 2023-10-23 DIAGNOSIS — I10 ESSENTIAL (PRIMARY) HYPERTENSION: Primary | ICD-10-CM

## 2023-10-23 DIAGNOSIS — Z23 NEED FOR HEPATITIS B VACCINATION: ICD-10-CM

## 2023-10-23 DIAGNOSIS — Z11.4 SCREENING FOR HIV WITHOUT PRESENCE OF RISK FACTORS: ICD-10-CM

## 2023-10-23 PROCEDURE — 90739 HEPB VACC 2/4 DOSE ADULT IM: CPT | Performed by: FAMILY MEDICINE

## 2023-10-23 PROCEDURE — 90471 IMMUNIZATION ADMIN: CPT | Performed by: FAMILY MEDICINE

## 2023-10-23 PROCEDURE — 99214 OFFICE O/P EST MOD 30 MIN: CPT | Performed by: FAMILY MEDICINE

## 2023-10-23 PROCEDURE — 3074F SYST BP LT 130 MM HG: CPT | Performed by: FAMILY MEDICINE

## 2023-10-23 PROCEDURE — 3078F DIAST BP <80 MM HG: CPT | Performed by: FAMILY MEDICINE

## 2023-10-23 PROCEDURE — 90674 CCIIV4 VAC NO PRSV 0.5 ML IM: CPT | Performed by: FAMILY MEDICINE

## 2023-10-23 PROCEDURE — 90472 IMMUNIZATION ADMIN EACH ADD: CPT | Performed by: FAMILY MEDICINE

## 2023-10-23 RX ORDER — SEMAGLUTIDE 0.25 MG/.5ML
0.25 INJECTION, SOLUTION SUBCUTANEOUS
Qty: 2 ML | Refills: 0 | Status: SHIPPED | OUTPATIENT
Start: 2023-10-23

## 2023-10-23 ASSESSMENT — PATIENT HEALTH QUESTIONNAIRE - PHQ9
2. FEELING DOWN, DEPRESSED OR HOPELESS: 0
1. LITTLE INTEREST OR PLEASURE IN DOING THINGS: 0
SUM OF ALL RESPONSES TO PHQ9 QUESTIONS 1 & 2: 0
SUM OF ALL RESPONSES TO PHQ QUESTIONS 1-9: 0

## 2023-10-23 ASSESSMENT — ENCOUNTER SYMPTOMS
VOMITING: 0
NAUSEA: 0
SHORTNESS OF BREATH: 0
COUGH: 0
BACK PAIN: 0

## 2023-10-23 NOTE — PROGRESS NOTES
Chief Complaint   Patient presents with    Hypertension    Follow-up Chronic Condition     1. \"Have you been to the ER, urgent care clinic since your last visit? Hospitalized since your last visit? \" no    2. \"Have you seen or consulted any other health care providers outside of the [address] [place] [address] since your last visit? \"  no
Hypercholesterolemia     Hypertension     Menopause     Obesity (BMI 35.0-39.9 without comorbidity)     Rectal cancer [facility])      Past Surgical History:   Procedure Laterality Date     SECTION  2006    CHOLECYSTECTOMY, LAPAROSCOPIC      COLONOSCOPY  2020    Betty Espinosa MD    FLEXIBLE SIGMOIDOSCOPY N/A 2020    Flexible sigmoidoscopy and rectal endoscopic ultrasound; Jared Johnston MD    GYN  2003    Uterine myomectomy for fibroids; Gilda Mckeon MD    ORTHOPEDIC SURGERY Left 2005    ORIF ANKLE    OTHER SURGICAL HISTORY  2020    Hand-assisted laparoscopic low anterior resection, mobilization of the splenic flexure, and coloproctostomy; Dr. Morgan Mendez. UROLOGICAL SURGERY  2020    Cystourethroscopy and placement of temporary bilateral ureteral catheters; Karen Locke MD.     Family History   Problem Relation Age of Onset    Cancer Paternal Grandmother         Still living    Cancer Maternal Grandmother             Diabetes Paternal Aunt             Cancer Father             Hypertension Father     Elevated Lipids Father     Asthma Mother         Managed    Anesth Problems Neg Hx      Social History     Tobacco Use    Smoking status: Former     Packs/day: 0.25     Years: 15.00     Additional pack years: 0.00     Total pack years: 3.75     Types: Cigarettes     Start date: 1992     Quit date: 2013     Years since quitting: 10.7    Smokeless tobacco: Never   Substance Use Topics    Alcohol use: Yes     Alcohol/week: 1.0 standard drink of alcohol     Types: 1 Glasses of wine per week     Comment: Social drinker        Review of Systems   Constitutional:  Negative for chills and fever. HENT:  Negative for hearing loss and tinnitus. Eyes:  Negative for visual disturbance. Respiratory:  Negative for cough and shortness of breath. Cardiovascular:  Negative for chest pain and palpitations.    Gastrointestinal:  Negative for nausea and

## 2023-10-26 ENCOUNTER — CLINICAL DOCUMENTATION (OUTPATIENT)
Age: 53
End: 2023-10-26

## 2023-10-26 NOTE — PROGRESS NOTES
CARMEN Denial  Received: Krysten Herzog MD; Marie Odell LPN  Replies will be sent to Lizy Lanza,     [facility]FORT BRIAN 0.25 mg/0.5 ml is denied for Mrs. Ponec. It is a plan exclusion and not covered, even with a PA.      Thanks,     Mandeep Gaming

## 2023-11-06 DIAGNOSIS — R73.09 ELEVATED GLUCOSE: ICD-10-CM

## 2023-11-06 DIAGNOSIS — I10 ESSENTIAL (PRIMARY) HYPERTENSION: ICD-10-CM

## 2023-11-06 DIAGNOSIS — E78.2 MIXED HYPERLIPIDEMIA: ICD-10-CM

## 2023-11-06 DIAGNOSIS — Z11.4 SCREENING FOR HIV WITHOUT PRESENCE OF RISK FACTORS: ICD-10-CM

## 2023-11-06 LAB
ALBUMIN SERPL-MCNC: 4 G/DL (ref 3.5–5)
ALBUMIN/GLOB SERPL: 1 (ref 1.1–2.2)
ALP SERPL-CCNC: 100 U/L (ref 45–117)
ALT SERPL-CCNC: 25 U/L (ref 12–78)
ANION GAP SERPL CALC-SCNC: 8 MMOL/L (ref 5–15)
AST SERPL-CCNC: 17 U/L (ref 15–37)
BILIRUB SERPL-MCNC: 0.8 MG/DL (ref 0.2–1)
BUN SERPL-MCNC: 14 MG/DL (ref 6–20)
BUN/CREAT SERPL: 14 (ref 12–20)
CALCIUM SERPL-MCNC: 9.3 MG/DL (ref 8.5–10.1)
CHLORIDE SERPL-SCNC: 103 MMOL/L (ref 97–108)
CHOLEST SERPL-MCNC: 199 MG/DL
CO2 SERPL-SCNC: 28 MMOL/L (ref 21–32)
CREAT SERPL-MCNC: 1.02 MG/DL (ref 0.55–1.02)
EST. AVERAGE GLUCOSE BLD GHB EST-MCNC: 140 MG/DL
GLOBULIN SER CALC-MCNC: 4.2 G/DL (ref 2–4)
GLUCOSE SERPL-MCNC: 117 MG/DL (ref 65–100)
HBA1C MFR BLD: 6.5 % (ref 4–5.6)
HDLC SERPL-MCNC: 48 MG/DL
HDLC SERPL: 4.1 (ref 0–5)
LDLC SERPL CALC-MCNC: 131.2 MG/DL (ref 0–100)
POTASSIUM SERPL-SCNC: 3.6 MMOL/L (ref 3.5–5.1)
PROT SERPL-MCNC: 8.2 G/DL (ref 6.4–8.2)
SODIUM SERPL-SCNC: 139 MMOL/L (ref 136–145)
TRIGL SERPL-MCNC: 99 MG/DL
VLDLC SERPL CALC-MCNC: 19.8 MG/DL

## 2023-11-07 LAB
HIV 1+2 AB+HIV1 P24 AG SERPL QL IA: NONREACTIVE
HIV 1/2 RESULT COMMENT: NORMAL

## 2024-03-13 DIAGNOSIS — F51.01 PRIMARY INSOMNIA: Primary | ICD-10-CM

## 2024-03-13 RX ORDER — ZOLPIDEM TARTRATE 5 MG/1
5 TABLET ORAL NIGHTLY PRN
Qty: 30 TABLET | Refills: 1 | Status: SHIPPED | OUTPATIENT
Start: 2024-03-13 | End: 2024-04-12

## 2024-04-16 ENCOUNTER — HOSPITAL ENCOUNTER (OUTPATIENT)
Age: 54
Discharge: HOME | End: 2024-04-19
Payer: COMMERCIAL

## 2024-04-16 VITALS — WEIGHT: 205 LBS | HEIGHT: 63 IN | BODY MASS INDEX: 36.32 KG/M2

## 2024-04-16 DIAGNOSIS — Z12.31 VISIT FOR SCREENING MAMMOGRAM: ICD-10-CM

## 2024-04-16 PROCEDURE — 77067 SCR MAMMO BI INCL CAD: CPT

## 2024-04-29 ENCOUNTER — OFFICE VISIT (OUTPATIENT)
Age: 54
End: 2024-04-29
Payer: COMMERCIAL

## 2024-04-29 VITALS
OXYGEN SATURATION: 97 % | BODY MASS INDEX: 37.39 KG/M2 | SYSTOLIC BLOOD PRESSURE: 105 MMHG | HEIGHT: 63 IN | HEART RATE: 77 BPM | TEMPERATURE: 97.4 F | RESPIRATION RATE: 16 BRPM | WEIGHT: 211 LBS | DIASTOLIC BLOOD PRESSURE: 73 MMHG

## 2024-04-29 DIAGNOSIS — E78.2 MIXED HYPERLIPIDEMIA: ICD-10-CM

## 2024-04-29 DIAGNOSIS — F51.01 PRIMARY INSOMNIA: ICD-10-CM

## 2024-04-29 DIAGNOSIS — E66.01 MORBID (SEVERE) OBESITY DUE TO EXCESS CALORIES (HCC): ICD-10-CM

## 2024-04-29 DIAGNOSIS — E11.9 TYPE 2 DIABETES MELLITUS WITHOUT COMPLICATION, WITHOUT LONG-TERM CURRENT USE OF INSULIN (HCC): ICD-10-CM

## 2024-04-29 DIAGNOSIS — I10 ESSENTIAL (PRIMARY) HYPERTENSION: Primary | ICD-10-CM

## 2024-04-29 LAB
ALBUMIN SERPL-MCNC: 4 G/DL (ref 3.5–5)
ALBUMIN/GLOB SERPL: 1 (ref 1.1–2.2)
ALP SERPL-CCNC: 105 U/L (ref 45–117)
ALT SERPL-CCNC: 21 U/L (ref 12–78)
ANION GAP SERPL CALC-SCNC: 4 MMOL/L (ref 5–15)
AST SERPL-CCNC: 17 U/L (ref 15–37)
BILIRUB SERPL-MCNC: 0.7 MG/DL (ref 0.2–1)
BUN SERPL-MCNC: 16 MG/DL (ref 6–20)
BUN/CREAT SERPL: 15 (ref 12–20)
CALCIUM SERPL-MCNC: 9.9 MG/DL (ref 8.5–10.1)
CHLORIDE SERPL-SCNC: 105 MMOL/L (ref 97–108)
CHOLEST SERPL-MCNC: 212 MG/DL
CO2 SERPL-SCNC: 30 MMOL/L (ref 21–32)
CREAT SERPL-MCNC: 1.06 MG/DL (ref 0.55–1.02)
ERYTHROCYTE [DISTWIDTH] IN BLOOD BY AUTOMATED COUNT: 12.3 % (ref 11.5–14.5)
EST. AVERAGE GLUCOSE BLD GHB EST-MCNC: 137 MG/DL
GLOBULIN SER CALC-MCNC: 4 G/DL (ref 2–4)
GLUCOSE SERPL-MCNC: 130 MG/DL (ref 65–100)
HBA1C MFR BLD: 6.4 % (ref 4–5.6)
HCT VFR BLD AUTO: 41.1 % (ref 35–47)
HDLC SERPL-MCNC: 47 MG/DL
HDLC SERPL: 4.5 (ref 0–5)
HGB BLD-MCNC: 13.6 G/DL (ref 11.5–16)
LDLC SERPL CALC-MCNC: 144.2 MG/DL (ref 0–100)
MCH RBC QN AUTO: 31.4 PG (ref 26–34)
MCHC RBC AUTO-ENTMCNC: 33.1 G/DL (ref 30–36.5)
MCV RBC AUTO: 94.9 FL (ref 80–99)
NRBC # BLD: 0 K/UL (ref 0–0.01)
NRBC BLD-RTO: 0 PER 100 WBC
PLATELET # BLD AUTO: 341 K/UL (ref 150–400)
PMV BLD AUTO: 10.1 FL (ref 8.9–12.9)
POTASSIUM SERPL-SCNC: 3.7 MMOL/L (ref 3.5–5.1)
PROT SERPL-MCNC: 8 G/DL (ref 6.4–8.2)
RBC # BLD AUTO: 4.33 M/UL (ref 3.8–5.2)
SODIUM SERPL-SCNC: 139 MMOL/L (ref 136–145)
TRIGL SERPL-MCNC: 104 MG/DL
VLDLC SERPL CALC-MCNC: 20.8 MG/DL
WBC # BLD AUTO: 6.8 K/UL (ref 3.6–11)

## 2024-04-29 PROCEDURE — 99214 OFFICE O/P EST MOD 30 MIN: CPT | Performed by: FAMILY MEDICINE

## 2024-04-29 PROCEDURE — 3044F HG A1C LEVEL LT 7.0%: CPT | Performed by: FAMILY MEDICINE

## 2024-04-29 PROCEDURE — 3078F DIAST BP <80 MM HG: CPT | Performed by: FAMILY MEDICINE

## 2024-04-29 PROCEDURE — 3074F SYST BP LT 130 MM HG: CPT | Performed by: FAMILY MEDICINE

## 2024-04-29 RX ORDER — ZOLPIDEM TARTRATE 5 MG/1
5 TABLET ORAL NIGHTLY PRN
COMMUNITY
End: 2024-04-29 | Stop reason: SDUPTHER

## 2024-04-29 RX ORDER — FEZOLINETANT 45 MG/1
45 TABLET, FILM COATED ORAL DAILY
COMMUNITY
Start: 2024-04-22

## 2024-04-29 RX ORDER — ZOLPIDEM TARTRATE 5 MG/1
5 TABLET ORAL NIGHTLY PRN
Qty: 30 TABLET | Refills: 2 | Status: SHIPPED | OUTPATIENT
Start: 2024-04-29 | End: 2024-07-28

## 2024-04-29 SDOH — ECONOMIC STABILITY: FOOD INSECURITY: WITHIN THE PAST 12 MONTHS, THE FOOD YOU BOUGHT JUST DIDN'T LAST AND YOU DIDN'T HAVE MONEY TO GET MORE.: NEVER TRUE

## 2024-04-29 SDOH — ECONOMIC STABILITY: FOOD INSECURITY: WITHIN THE PAST 12 MONTHS, YOU WORRIED THAT YOUR FOOD WOULD RUN OUT BEFORE YOU GOT MONEY TO BUY MORE.: NEVER TRUE

## 2024-04-29 SDOH — ECONOMIC STABILITY: INCOME INSECURITY: HOW HARD IS IT FOR YOU TO PAY FOR THE VERY BASICS LIKE FOOD, HOUSING, MEDICAL CARE, AND HEATING?: NOT HARD AT ALL

## 2024-04-29 ASSESSMENT — ENCOUNTER SYMPTOMS
VOMITING: 0
NAUSEA: 0
BACK PAIN: 0
SHORTNESS OF BREATH: 0
COUGH: 0

## 2024-04-29 ASSESSMENT — PATIENT HEALTH QUESTIONNAIRE - PHQ9
2. FEELING DOWN, DEPRESSED OR HOPELESS: NOT AT ALL
SUM OF ALL RESPONSES TO PHQ QUESTIONS 1-9: 0
SUM OF ALL RESPONSES TO PHQ QUESTIONS 1-9: 0
1. LITTLE INTEREST OR PLEASURE IN DOING THINGS: NOT AT ALL
SUM OF ALL RESPONSES TO PHQ9 QUESTIONS 1 & 2: 0
SUM OF ALL RESPONSES TO PHQ QUESTIONS 1-9: 0
SUM OF ALL RESPONSES TO PHQ QUESTIONS 1-9: 0

## 2024-04-29 NOTE — PROGRESS NOTES
Chief Complaint   Patient presents with    Hypertension    Cholesterol Problem    Anxiety    Insomnia     \"Have you been to the ER, urgent care clinic since your last visit?  Hospitalized since your last visit?\"    UC Patient first Allergic rhinitis. Rx for Flonase given. Advised against potential rebound congestion with prolonged use of nasal decongestants. Labs negative for strep, flu, and COVID-19. FU prn INI. (-JS) _04.05.24 06:46 Epi 1 for Flu/COVID Combo was sent electronically to [facility]. (sys)     “Have you seen or consulted any other health care providers outside of [facility] since your last visit?”      no          Click Here for Release of Records Request    
scribed, in my presence, and it is both accurate and complete.        10:18 AM - 10:32 AM    Total time spent with the patient 15 minutes, greater than 50% of time spent counseling patient.

## 2024-04-30 NOTE — RESULT ENCOUNTER NOTE
Labs are stable.  Your creatinine levels are slightly elevated you may have been dry that day.  I will see you again as advised and hopefully will get the A1c numbers down  And you will see some improvement with your weight loss also with the Mounjaro

## 2024-05-27 DIAGNOSIS — E11.9 TYPE 2 DIABETES MELLITUS WITHOUT COMPLICATION, WITHOUT LONG-TERM CURRENT USE OF INSULIN (HCC): ICD-10-CM

## 2024-05-30 DIAGNOSIS — E11.9 TYPE 2 DIABETES MELLITUS WITHOUT COMPLICATION, WITHOUT LONG-TERM CURRENT USE OF INSULIN (HCC): ICD-10-CM

## 2024-05-30 RX ORDER — TIRZEPATIDE 5 MG/.5ML
5 INJECTION, SOLUTION SUBCUTANEOUS WEEKLY
Qty: 2 ML | Refills: 0 | Status: SHIPPED | OUTPATIENT
Start: 2024-05-30

## 2024-05-30 RX ORDER — TIRZEPATIDE 2.5 MG/.5ML
INJECTION, SOLUTION SUBCUTANEOUS
Qty: 2 ML | Refills: 0 | OUTPATIENT
Start: 2024-05-30

## 2024-05-30 NOTE — TELEPHONE ENCOUNTER
MD Davis,    Refill request for Mounjaro 2.5mg .      **Noted patient to increase each month.  (Last rx 2.5mg 4/29/24)    Entered the Mounjaro 5mg if appropriate.    **Patient stated is ok with increase in dose.**    Last appointment: 4/29/24 Ryan  Next appointment: 10/14/24 Ryan  Previous refill encounter(s): 4/29/24 2ml (2.5mg)    Requested Prescriptions     Pending Prescriptions Disp Refills    Tirzepatide (MOUNJARO) 5 MG/0.5ML SOPN SC injection 2 mL 0     Sig: Inject 0.5 mLs into the skin once a week     Refused Prescriptions Disp Refills    MOUNJARO 2.5 MG/0.5ML SOPN SC injection [Pharmacy Med Name: MOUNJARO 2.5MG/0.5ML PF PEN INJ] 2 mL 0     Sig: ADMINISTER 2.5 MG UNDER THE SKIN 1 TIME A WEEK     For Pharmacy Admin Tracking Only    Program: Medication Refill  CPA in place:    Recommendation Provided To:   Intervention Detail: New Rx: 1, reason: Patient Preference  Intervention Accepted By:   Gap Closed?:    Time Spent (min): 5

## 2024-06-27 RX ORDER — HYDROCHLOROTHIAZIDE 12.5 MG/1
12.5 TABLET ORAL DAILY
Qty: 90 TABLET | Refills: 1 | Status: SHIPPED | OUTPATIENT
Start: 2024-06-27

## 2024-06-27 RX ORDER — TIRZEPATIDE 5 MG/.5ML
INJECTION, SOLUTION SUBCUTANEOUS
Qty: 2 ML | Refills: 3 | Status: SHIPPED | OUTPATIENT
Start: 2024-06-27

## 2024-06-27 NOTE — TELEPHONE ENCOUNTER
Last appointment: 4/29/24 Ryan  Next appointment: 10/14/24 Ryan  Previous refill encounter(s): 10/13/23 90 + 1    Requested Prescriptions     Pending Prescriptions Disp Refills    hydroCHLOROthiazide 12.5 MG tablet [Pharmacy Med Name: HYDROCHLOROT TAB 12.5MG] 90 tablet 1     Sig: Take 1 tablet by mouth daily     For Pharmacy Admin Tracking Only    Program: Medication Refill  CPA in place:    Recommendation Provided To:   Intervention Detail: New Rx: 1, reason: Patient Preference  Intervention Accepted By:   Gap Closed?:    Time Spent (min): 5

## 2024-06-27 NOTE — TELEPHONE ENCOUNTER
MD Davis,    Refill request for Mounjaro 5mg.      Are we going up a dose again to 7.5mg?  Last rx 5mg on 5/30/24 2ml.    Or stay at 5mg?  Akash, Anny    Last appointment: 4/29/24  Ryan  Next appointment: 10/14/24 Ryan  Previous refill encounter(s): 5/30/24 (5mg) 2ml    Requested Prescriptions     Pending Prescriptions Disp Refills    MOUNJARO 5 MG/0.5ML SOPN SC injection [Pharmacy Med Name: MOUNJARO 5MG/0.5ML INJ (4 PENS)] 2 mL 0     Sig: ADMINISTER 5 MG UNDER THE SKIN 1 TIME A WEEK     For Pharmacy Admin Tracking Only    Program: Medication Refill  CPA in place:    Recommendation Provided To:   Intervention Detail: New Rx: 1, reason: Patient Preference  Intervention Accepted By:   Gap Closed?:    Time Spent (min): 5

## 2024-10-14 ENCOUNTER — OFFICE VISIT (OUTPATIENT)
Age: 54
End: 2024-10-14
Payer: COMMERCIAL

## 2024-10-14 VITALS
OXYGEN SATURATION: 98 % | WEIGHT: 180 LBS | SYSTOLIC BLOOD PRESSURE: 103 MMHG | TEMPERATURE: 97.6 F | DIASTOLIC BLOOD PRESSURE: 69 MMHG | RESPIRATION RATE: 16 BRPM | BODY MASS INDEX: 31.89 KG/M2 | HEIGHT: 63 IN | HEART RATE: 69 BPM

## 2024-10-14 DIAGNOSIS — E78.2 MIXED HYPERLIPIDEMIA: ICD-10-CM

## 2024-10-14 DIAGNOSIS — E11.9 TYPE 2 DIABETES MELLITUS WITHOUT COMPLICATION, WITHOUT LONG-TERM CURRENT USE OF INSULIN (HCC): ICD-10-CM

## 2024-10-14 DIAGNOSIS — I10 ESSENTIAL (PRIMARY) HYPERTENSION: ICD-10-CM

## 2024-10-14 DIAGNOSIS — Z23 NEED FOR HEPATITIS B VACCINATION: Primary | ICD-10-CM

## 2024-10-14 DIAGNOSIS — E66.811 OBESITY, CLASS I, BMI 30-34.9: ICD-10-CM

## 2024-10-14 DIAGNOSIS — G47.00 INSOMNIA, UNSPECIFIED TYPE: ICD-10-CM

## 2024-10-14 DIAGNOSIS — Z23 FLU VACCINE NEED: ICD-10-CM

## 2024-10-14 LAB
ALBUMIN SERPL-MCNC: 4.1 G/DL (ref 3.5–5)
ALBUMIN/GLOB SERPL: 1.1 (ref 1.1–2.2)
ALP SERPL-CCNC: 107 U/L (ref 45–117)
ALT SERPL-CCNC: 22 U/L (ref 12–78)
ANION GAP SERPL CALC-SCNC: 5 MMOL/L (ref 2–12)
AST SERPL-CCNC: 16 U/L (ref 15–37)
BILIRUB SERPL-MCNC: 0.4 MG/DL (ref 0.2–1)
BUN SERPL-MCNC: 13 MG/DL (ref 6–20)
BUN/CREAT SERPL: 15 (ref 12–20)
CALCIUM SERPL-MCNC: 9.9 MG/DL (ref 8.5–10.1)
CHLORIDE SERPL-SCNC: 104 MMOL/L (ref 97–108)
CHOLEST SERPL-MCNC: 200 MG/DL
CO2 SERPL-SCNC: 31 MMOL/L (ref 21–32)
CREAT SERPL-MCNC: 0.89 MG/DL (ref 0.55–1.02)
CREAT UR-MCNC: 355 MG/DL
EST. AVERAGE GLUCOSE BLD GHB EST-MCNC: 108 MG/DL
GLOBULIN SER CALC-MCNC: 3.9 G/DL (ref 2–4)
GLUCOSE SERPL-MCNC: 87 MG/DL (ref 65–100)
HBA1C MFR BLD: 5.4 % (ref 4–5.6)
HDLC SERPL-MCNC: 44 MG/DL
HDLC SERPL: 4.5 (ref 0–5)
LDLC SERPL CALC-MCNC: 137 MG/DL (ref 0–100)
MICROALBUMIN UR-MCNC: 1.75 MG/DL
MICROALBUMIN/CREAT UR-RTO: 5 MG/G (ref 0–30)
POTASSIUM SERPL-SCNC: 3.6 MMOL/L (ref 3.5–5.1)
PROT SERPL-MCNC: 8 G/DL (ref 6.4–8.2)
SODIUM SERPL-SCNC: 140 MMOL/L (ref 136–145)
TRIGL SERPL-MCNC: 95 MG/DL
VLDLC SERPL CALC-MCNC: 19 MG/DL

## 2024-10-14 PROCEDURE — 90739 HEPB VACC 2/4 DOSE ADULT IM: CPT | Performed by: FAMILY MEDICINE

## 2024-10-14 PROCEDURE — 90471 IMMUNIZATION ADMIN: CPT | Performed by: FAMILY MEDICINE

## 2024-10-14 PROCEDURE — 3074F SYST BP LT 130 MM HG: CPT | Performed by: FAMILY MEDICINE

## 2024-10-14 PROCEDURE — 99214 OFFICE O/P EST MOD 30 MIN: CPT | Performed by: FAMILY MEDICINE

## 2024-10-14 PROCEDURE — 3044F HG A1C LEVEL LT 7.0%: CPT | Performed by: FAMILY MEDICINE

## 2024-10-14 PROCEDURE — 3078F DIAST BP <80 MM HG: CPT | Performed by: FAMILY MEDICINE

## 2024-10-14 PROCEDURE — 90661 CCIIV3 VAC ABX FR 0.5 ML IM: CPT | Performed by: FAMILY MEDICINE

## 2024-10-14 PROCEDURE — 90472 IMMUNIZATION ADMIN EACH ADD: CPT | Performed by: FAMILY MEDICINE

## 2024-10-14 RX ORDER — ZOLPIDEM TARTRATE 5 MG/1
5 TABLET ORAL NIGHTLY PRN
Qty: 30 TABLET | Refills: 2 | Status: SHIPPED | OUTPATIENT
Start: 2024-10-14 | End: 2024-10-15

## 2024-10-14 RX ORDER — ZOLPIDEM TARTRATE 5 MG/1
5 TABLET ORAL NIGHTLY PRN
COMMUNITY
End: 2024-10-14 | Stop reason: SDUPTHER

## 2024-10-14 RX ORDER — TIRZEPATIDE 5 MG/.5ML
5 INJECTION, SOLUTION SUBCUTANEOUS WEEKLY
Qty: 4 ADJUSTABLE DOSE PRE-FILLED PEN SYRINGE | Refills: 5 | Status: SHIPPED | OUTPATIENT
Start: 2024-10-14

## 2024-10-14 SDOH — ECONOMIC STABILITY: FOOD INSECURITY: WITHIN THE PAST 12 MONTHS, YOU WORRIED THAT YOUR FOOD WOULD RUN OUT BEFORE YOU GOT MONEY TO BUY MORE.: NEVER TRUE

## 2024-10-14 SDOH — ECONOMIC STABILITY: FOOD INSECURITY: WITHIN THE PAST 12 MONTHS, THE FOOD YOU BOUGHT JUST DIDN'T LAST AND YOU DIDN'T HAVE MONEY TO GET MORE.: NEVER TRUE

## 2024-10-14 SDOH — ECONOMIC STABILITY: INCOME INSECURITY: HOW HARD IS IT FOR YOU TO PAY FOR THE VERY BASICS LIKE FOOD, HOUSING, MEDICAL CARE, AND HEATING?: NOT HARD AT ALL

## 2024-10-14 ASSESSMENT — ENCOUNTER SYMPTOMS
VOMITING: 0
SHORTNESS OF BREATH: 0
BACK PAIN: 0
COUGH: 0
NAUSEA: 0

## 2024-10-14 ASSESSMENT — PATIENT HEALTH QUESTIONNAIRE - PHQ9
SUM OF ALL RESPONSES TO PHQ9 QUESTIONS 1 & 2: 0
1. LITTLE INTEREST OR PLEASURE IN DOING THINGS: NOT AT ALL
2. FEELING DOWN, DEPRESSED OR HOPELESS: NOT AT ALL
SUM OF ALL RESPONSES TO PHQ QUESTIONS 1-9: 0

## 2024-10-14 NOTE — PROGRESS NOTES
Chief Complaint   Patient presents with    Hypertension     \"Have you been to the ER, urgent care clinic since your last visit?  Hospitalized since your last visit?\"    No     “Have you seen or consulted any other health care providers outside our system since your last visit?”    No              
   VEOZAH 45 MG TABS Take 45 mg by mouth daily (Patient not taking: Reported on 10/14/2024)       No current facility-administered medications for this visit.     Allergies   Allergen Reactions    Oxycodone-Acetaminophen Itching     No longer an allergy     Past Medical History:   Diagnosis Date    Diverticulosis     Fibroids     Uterine fibroids    Hypercholesterolemia     Hypertension     Menopause     Obesity (BMI 35.0-39.9 without comorbidity)     Rectal cancer (HCC)      Past Surgical History:   Procedure Laterality Date     SECTION  2006    CHOLECYSTECTOMY, LAPAROSCOPIC      COLONOSCOPY  2020    Wilder Dia MD    FLEXIBLE SIGMOIDOSCOPY N/A 2020    Flexible sigmoidoscopy and rectal endoscopic ultrasound; Kiah Chavarria MD    GYN      Uterine myomectomy for fibroids; Cecil Leblanc MD    ORTHOPEDIC SURGERY Left     ORIF ANKLE    OTHER SURGICAL HISTORY  2020    Hand-assisted laparoscopic low anterior resection, mobilization of the splenic flexure, and coloproctostomy; Dr. Santiago.    UROLOGICAL SURGERY  2020    Cystourethroscopy and placement of temporary bilateral ureteral catheters; Elijah Chapman MD.     Family History   Problem Relation Age of Onset    Cancer Paternal Grandmother         Still living    Cancer Maternal Grandmother             Diabetes Paternal Aunt             Cancer Father             Hypertension Father     Elevated Lipids Father     Asthma Mother         Managed    Anesth Problems Neg Hx      Social History     Tobacco Use    Smoking status: Former     Current packs/day: 0.00     Average packs/day: 0.3 packs/day for 20.7 years (5.2 ttl pk-yrs)     Types: Cigarettes     Start date: 1992     Quit date: 2013     Years since quittin.7    Smokeless tobacco: Never   Substance Use Topics    Alcohol use: Yes     Alcohol/week: 1.0 standard drink of alcohol     Types: 1 Glasses of wine per week     Comment:

## 2024-10-15 RX ORDER — ZOLPIDEM TARTRATE 5 MG/1
TABLET ORAL
Qty: 30 TABLET | Refills: 2 | Status: SHIPPED | OUTPATIENT
Start: 2024-10-15 | End: 2025-01-13

## 2024-11-20 NOTE — TELEPHONE ENCOUNTER
Last appointment: 10/14/24 Ryan  Next appointment: 4/14/25 Ryan  Previous refill encounter(s): 6/27/24 90 + 1    Requested Prescriptions     Pending Prescriptions Disp Refills    hydroCHLOROthiazide 12.5 MG tablet [Pharmacy Med Name: HYDROCHLOROTHIAZIDE 12.5MG TABLETS] 90 tablet 1     Sig: Take 1 tablet by mouth daily     For Pharmacy Admin Tracking Only    Program: Medication Refill  CPA in place:    Recommendation Provided To:   Intervention Detail: New Rx: 1, reason: Patient Preference  Intervention Accepted By:   Gap Closed?:    Time Spent (min): 5

## 2024-11-21 RX ORDER — HYDROCHLOROTHIAZIDE 12.5 MG/1
12.5 TABLET ORAL DAILY
Qty: 90 TABLET | Refills: 1 | Status: SHIPPED | OUTPATIENT
Start: 2024-11-21

## 2025-01-22 ENCOUNTER — TELEPHONE (OUTPATIENT)
Age: 55
End: 2025-01-22

## 2025-01-22 DIAGNOSIS — E11.9 TYPE 2 DIABETES MELLITUS WITHOUT COMPLICATION, WITHOUT LONG-TERM CURRENT USE OF INSULIN (HCC): Primary | ICD-10-CM

## 2025-01-22 RX ORDER — TIRZEPATIDE 5 MG/.5ML
INJECTION, SOLUTION SUBCUTANEOUS
Qty: 2 ML | Refills: 1 | Status: SHIPPED | OUTPATIENT
Start: 2025-01-22

## 2025-01-22 NOTE — TELEPHONE ENCOUNTER
? Too soon to fill       PCP: Lars Davis MD    Last appt: 10/14/2024   Future Appointments   Date Time Provider Department Center   4/14/2025  9:00 AM Lars Davis MD [facility] DEP       Requested Prescriptions     Pending Prescriptions Disp Refills    MOUNJARO 5 MG/0.5ML SOAJ [Pharmacy Med Name: MOUNJARO 5MG/0.5ML INJ (4 PENS)] 2 mL      Sig: ADMINISTER 5 MG UNDER THE SKIN 1 TIME A WEEK         Prior labs and Blood pressures:  BP Readings from Last 3 Encounters:   10/14/24 103/69   04/29/24 105/73   10/23/23 130/80     Lab Results   Component Value Date/Time     10/14/2024 09:50 AM    K 3.6 10/14/2024 09:50 AM     10/14/2024 09:50 AM    CO2 31 10/14/2024 09:50 AM    BUN 13 10/14/2024 09:50 AM    GFRAA >60 08/22/2022 08:42 AM     No results found for: \"HBA1C\", \"[id number]\"  Lab Results   Component Value Date/Time    CHOL 200 10/14/2024 09:50 AM    HDL 44 10/14/2024 09:50 AM     10/14/2024 09:50 AM    .2 04/29/2024 10:40 AM    VLDL 19 10/14/2024 09:50 AM    VLDL 23 09/16/2020 08:16 AM     No results found for: \"VITD3\"    No results found for: \"TSH\", \"TSH2\", \"TSH3\"

## 2025-01-22 NOTE — TELEPHONE ENCOUNTER
Giovanni Priest stating the zolpidem 5mg is not covered by patient plan.      Contacted [facility] as seemed strange- Stated Plan Limitations Exceeded.  Needs a PA.     Rx: 10/15/24 for 30 + 2 refills (last filled rx on 11/22/24)- Has #30 left on Rx.    Preferred alternatives: Eszopiclone tab, Zaleplon cap    Please contact insurance for PA.       (Tried contacting patient to see if she pays out of pocket or w/coupon?- Left  for return call) ThanksAnny            For Pharmacy Admin Tracking Only    Program: Medication Refill  CPA in place:    Recommendation Provided To:   Intervention Detail: New Rx: 1, reason: Patient Preference  Intervention Accepted By:   Gap Closed?:    Time Spent (min): 5

## 2025-01-30 DIAGNOSIS — E11.9 TYPE 2 DIABETES MELLITUS WITHOUT COMPLICATION, WITHOUT LONG-TERM CURRENT USE OF INSULIN (HCC): ICD-10-CM

## 2025-01-31 RX ORDER — TIRZEPATIDE 5 MG/.5ML
INJECTION, SOLUTION SUBCUTANEOUS
Qty: 2 ML | Refills: 1 | OUTPATIENT
Start: 2025-01-31

## 2025-01-31 NOTE — TELEPHONE ENCOUNTER
PCP: Lars Davis MD    Last appt: 10/14/2024   Future Appointments   Date Time Provider Department Center   4/14/2025  9:00 AM [facility]       Requested Prescriptions     Pending Prescriptions Disp Refills    Tirzepatide (MOUNJARO) 5 MG/0.5ML SOAJ 2 mL 1         Prior labs and Blood pressures:  BP Readings from Last 3 Encounters:   10/14/24 103/69   04/29/24 105/73   10/23/23 130/80     Lab Results   Component Value Date/Time     10/14/2024 09:50 AM    K 3.6 10/14/2024 09:50 AM     10/14/2024 09:50 AM    CO2 31 10/14/2024 09:50 AM    BUN 13 10/14/2024 09:50 AM    GFRAA >60 08/22/2022 08:42 AM     No results found for: \"HBA1C\", \"[id number]\"  Lab Results   Component Value Date/Time    CHOL 200 10/14/2024 09:50 AM    HDL 44 10/14/2024 09:50 AM     10/14/2024 09:50 AM    .2 04/29/2024 10:40 AM    VLDL 19 10/14/2024 09:50 AM    VLDL 23 09/16/2020 08:16 AM     No results found for: \"VITD3\"    No results found for: \"TSH\", \"TSH2\", \"TSH3\"

## 2025-02-06 ENCOUNTER — CLINICAL DOCUMENTATION (OUTPATIENT)
Age: 55
End: 2025-02-06

## 2025-02-06 RX ORDER — ATORVASTATIN CALCIUM 40 MG/1
40 TABLET, FILM COATED ORAL DAILY
Qty: 90 TABLET | Refills: 2 | Status: SHIPPED | OUTPATIENT
Start: 2025-02-06

## 2025-02-06 NOTE — PROGRESS NOTES
Prior Authorization Prior auth for Mounjaro is still pending received more forms they have been completed and faxed back - Prior auth for Zolpidem was only approved for # 15

## 2025-03-04 NOTE — TELEPHONE ENCOUNTER
PCP: Lars Davis MD    Last appt: 10/14/2024   Future Appointments   Date Time Provider Department Center   4/14/2025  9:00 AM Lars Davis MD [facility] DEP       Requested Prescriptions     Pending Prescriptions Disp Refills    hydroCHLOROthiazide 12.5 MG tablet [Pharmacy Med Name: HYDROCHLOROT TAB 12.5MG] 90 tablet 1     Sig: TAKE 1 TABLET DAILY         Prior labs and Blood pressures:  BP Readings from Last 3 Encounters:   10/14/24 103/69   04/29/24 105/73   10/23/23 130/80     Lab Results   Component Value Date/Time     10/14/2024 09:50 AM    K 3.6 10/14/2024 09:50 AM     10/14/2024 09:50 AM    CO2 31 10/14/2024 09:50 AM    BUN 13 10/14/2024 09:50 AM    GFRAA >60 08/22/2022 08:42 AM     No results found for: \"HBA1C\", \"[id number]\"  Lab Results   Component Value Date/Time    CHOL 200 10/14/2024 09:50 AM    HDL 44 10/14/2024 09:50 AM     10/14/2024 09:50 AM    .2 04/29/2024 10:40 AM    VLDL 19 10/14/2024 09:50 AM    VLDL 23 09/16/2020 08:16 AM     No results found for: \"VITD3\"    No results found for: \"TSH\", \"TSH2\", \"TSH3\"

## 2025-03-05 RX ORDER — ZOLPIDEM TARTRATE 5 MG/1
5 TABLET ORAL NIGHTLY PRN
Qty: 15 TABLET | Refills: 0 | OUTPATIENT
Start: 2025-03-05 | End: 2025-03-20

## 2025-03-05 RX ORDER — HYDROCHLOROTHIAZIDE 12.5 MG/1
12.5 TABLET ORAL DAILY
Qty: 30 TABLET | Refills: 1 | Status: SHIPPED | OUTPATIENT
Start: 2025-03-05

## 2025-03-05 NOTE — TELEPHONE ENCOUNTER
Patient is requesting refill on Ambien 5 mg   Insurance will only cover 15 tabs     Last fill 10/15/2024    This will need a prior auth

## 2025-03-07 LAB
ALBUMIN SERPL-MCNC: 4 G/DL (ref 3.5–5)
ALBUMIN/GLOB SERPL: 1.2 (ref 1.1–2.2)
ALP SERPL-CCNC: 88 U/L (ref 45–117)
ALT SERPL-CCNC: 29 U/L (ref 12–78)
AST SERPL-CCNC: 22 U/L (ref 15–37)
BILIRUB DIRECT SERPL-MCNC: 0.1 MG/DL (ref 0–0.2)
BILIRUB SERPL-MCNC: 0.6 MG/DL (ref 0.2–1)
GLOBULIN SER CALC-MCNC: 3.4 G/DL (ref 2–4)
PROT SERPL-MCNC: 7.4 G/DL (ref 6.4–8.2)

## 2025-04-08 DIAGNOSIS — F51.01 PRIMARY INSOMNIA: Primary | ICD-10-CM

## 2025-04-08 RX ORDER — ZOLPIDEM TARTRATE 5 MG/1
5 TABLET ORAL NIGHTLY PRN
Qty: 15 TABLET | Refills: 0 | Status: SHIPPED | OUTPATIENT
Start: 2025-04-08 | End: 2025-04-22

## 2025-04-08 NOTE — TELEPHONE ENCOUNTER
Call and spoke to patient, two ID confirmed.   Patient stated that insurance will pay for Ambien 5 mg 1 po at HS PRN for sleep. They will only pay for 15 tab a month. If possible do 1/2 PO HS prn.     PCP: Lars Davis MD    Last appt: 10/14/2024     Future Appointments   Date Time Provider Department Center   4/14/2025  9:00 AM Lars Davis MD [facility] DEP       Requested Prescriptions     Pending Prescriptions Disp Refills    zolpidem (AMBIEN) 5 MG tablet 15 tablet 0     Sig: Take 1 tablet by mouth nightly as needed for Sleep for up to 14 days. Max Daily Amount: 5 mg       Prior labs and Blood pressures:  BP Readings from Last 3 Encounters:   10/14/24 103/69   04/29/24 105/73   10/23/23 130/80     Lab Results   Component Value Date/Time     10/14/2024 09:50 AM    K 3.6 10/14/2024 09:50 AM     10/14/2024 09:50 AM    CO2 31 10/14/2024 09:50 AM    BUN 13 10/14/2024 09:50 AM    GFRAA >60 08/22/2022 08:42 AM     No results found for: \"HBA1C\", \"[id number]\"  Lab Results   Component Value Date/Time    CHOL 200 10/14/2024 09:50 AM    HDL 44 10/14/2024 09:50 AM     10/14/2024 09:50 AM    .2 04/29/2024 10:40 AM    VLDL 19 10/14/2024 09:50 AM    VLDL 23 09/16/2020 08:16 AM     No results found for: \"VITD3\"    No results found for: \"TSH\", \"TSH2\", \"TSH3\"

## 2025-04-13 SDOH — ECONOMIC STABILITY: TRANSPORTATION INSECURITY
IN THE PAST 12 MONTHS, HAS THE LACK OF TRANSPORTATION KEPT YOU FROM MEDICAL APPOINTMENTS OR FROM GETTING MEDICATIONS?: NO

## 2025-04-13 SDOH — ECONOMIC STABILITY: INCOME INSECURITY: IN THE LAST 12 MONTHS, WAS THERE A TIME WHEN YOU WERE NOT ABLE TO PAY THE MORTGAGE OR RENT ON TIME?: NO

## 2025-04-13 SDOH — ECONOMIC STABILITY: FOOD INSECURITY: WITHIN THE PAST 12 MONTHS, YOU WORRIED THAT YOUR FOOD WOULD RUN OUT BEFORE YOU GOT MONEY TO BUY MORE.: NEVER TRUE

## 2025-04-13 SDOH — ECONOMIC STABILITY: FOOD INSECURITY: WITHIN THE PAST 12 MONTHS, THE FOOD YOU BOUGHT JUST DIDN'T LAST AND YOU DIDN'T HAVE MONEY TO GET MORE.: NEVER TRUE

## 2025-04-14 ENCOUNTER — TRANSCRIBE ORDERS (OUTPATIENT)
Facility: HOSPITAL | Age: 55
End: 2025-04-14

## 2025-04-14 ENCOUNTER — OFFICE VISIT (OUTPATIENT)
Age: 55
End: 2025-04-14
Payer: COMMERCIAL

## 2025-04-14 VITALS
HEIGHT: 63 IN | RESPIRATION RATE: 16 BRPM | HEART RATE: 72 BPM | SYSTOLIC BLOOD PRESSURE: 109 MMHG | OXYGEN SATURATION: 97 % | TEMPERATURE: 96.8 F | BODY MASS INDEX: 33.49 KG/M2 | DIASTOLIC BLOOD PRESSURE: 65 MMHG | WEIGHT: 189 LBS

## 2025-04-14 DIAGNOSIS — Z23 IMMUNIZATION DUE: ICD-10-CM

## 2025-04-14 DIAGNOSIS — E66.811 OBESITY, CLASS I, BMI 30-34.9: ICD-10-CM

## 2025-04-14 DIAGNOSIS — G47.9 SLEEP DISTURBANCE: ICD-10-CM

## 2025-04-14 DIAGNOSIS — E11.65 UNCONTROLLED TYPE 2 DIABETES MELLITUS WITH HYPERGLYCEMIA (HCC): Primary | ICD-10-CM

## 2025-04-14 DIAGNOSIS — E78.2 MIXED HYPERLIPIDEMIA: ICD-10-CM

## 2025-04-14 DIAGNOSIS — Z12.31 ENCOUNTER FOR SCREENING MAMMOGRAM FOR BREAST CANCER: Primary | ICD-10-CM

## 2025-04-14 DIAGNOSIS — I10 ESSENTIAL (PRIMARY) HYPERTENSION: ICD-10-CM

## 2025-04-14 LAB
ALBUMIN SERPL-MCNC: 4 G/DL (ref 3.5–5)
ALBUMIN/GLOB SERPL: 1 (ref 1.1–2.2)
ALP SERPL-CCNC: 101 U/L (ref 45–117)
ALT SERPL-CCNC: 21 U/L (ref 12–78)
ANION GAP SERPL CALC-SCNC: 5 MMOL/L (ref 2–12)
AST SERPL-CCNC: 24 U/L (ref 15–37)
BILIRUB SERPL-MCNC: 0.5 MG/DL (ref 0.2–1)
BUN SERPL-MCNC: 20 MG/DL (ref 6–20)
BUN/CREAT SERPL: 20 (ref 12–20)
CALCIUM SERPL-MCNC: 9.9 MG/DL (ref 8.5–10.1)
CHLORIDE SERPL-SCNC: 104 MMOL/L (ref 97–108)
CHOLEST SERPL-MCNC: 209 MG/DL
CO2 SERPL-SCNC: 29 MMOL/L (ref 21–32)
CREAT SERPL-MCNC: 0.99 MG/DL (ref 0.55–1.02)
CREAT UR-MCNC: 225 MG/DL
GLOBULIN SER CALC-MCNC: 3.9 G/DL (ref 2–4)
GLUCOSE SERPL-MCNC: 89 MG/DL (ref 65–100)
HBA1C MFR BLD: 7 %
HDLC SERPL-MCNC: 57 MG/DL
HDLC SERPL: 3.7 (ref 0–5)
LDLC SERPL CALC-MCNC: 136 MG/DL (ref 0–100)
MICROALBUMIN UR-MCNC: 1.06 MG/DL
MICROALBUMIN/CREAT UR-RTO: 5 MG/G (ref 0–30)
POTASSIUM SERPL-SCNC: 4 MMOL/L (ref 3.5–5.1)
PROT SERPL-MCNC: 7.9 G/DL (ref 6.4–8.2)
SODIUM SERPL-SCNC: 138 MMOL/L (ref 136–145)
TRIGL SERPL-MCNC: 80 MG/DL
VLDLC SERPL CALC-MCNC: 16 MG/DL

## 2025-04-14 PROCEDURE — 99214 OFFICE O/P EST MOD 30 MIN: CPT | Performed by: FAMILY MEDICINE

## 2025-04-14 PROCEDURE — 90715 TDAP VACCINE 7 YRS/> IM: CPT | Performed by: FAMILY MEDICINE

## 2025-04-14 PROCEDURE — 3051F HG A1C>EQUAL 7.0%<8.0%: CPT | Performed by: FAMILY MEDICINE

## 2025-04-14 PROCEDURE — 3078F DIAST BP <80 MM HG: CPT | Performed by: FAMILY MEDICINE

## 2025-04-14 PROCEDURE — 90471 IMMUNIZATION ADMIN: CPT | Performed by: FAMILY MEDICINE

## 2025-04-14 PROCEDURE — 83036 HEMOGLOBIN GLYCOSYLATED A1C: CPT | Performed by: FAMILY MEDICINE

## 2025-04-14 PROCEDURE — 3074F SYST BP LT 130 MM HG: CPT | Performed by: FAMILY MEDICINE

## 2025-04-14 RX ORDER — TIRZEPATIDE 5 MG/.5ML
INJECTION, SOLUTION SUBCUTANEOUS
Qty: 2 ML | Refills: 1 | Status: SHIPPED | OUTPATIENT
Start: 2025-04-14

## 2025-04-14 ASSESSMENT — PATIENT HEALTH QUESTIONNAIRE - PHQ9
SUM OF ALL RESPONSES TO PHQ QUESTIONS 1-9: 0
1. LITTLE INTEREST OR PLEASURE IN DOING THINGS: NOT AT ALL
2. FEELING DOWN, DEPRESSED OR HOPELESS: NOT AT ALL
SUM OF ALL RESPONSES TO PHQ QUESTIONS 1-9: 0

## 2025-04-14 ASSESSMENT — ENCOUNTER SYMPTOMS
ABDOMINAL PAIN: 0
SHORTNESS OF BREATH: 0

## 2025-04-14 NOTE — PROGRESS NOTES
Chief Complaint   Patient presents with    Hypertension     6 month follow up    Cholesterol Problem     Follow up         \"Have you been to the ER, urgent care clinic since your last visit?  Hospitalized since your last visit?\"    NO    “Have you seen or consulted any other health care providers outside of [facility] since your last visit?”    NO            Click Here for Release of Records Request           4/14/2025     9:00 AM   PHQ-9    Little interest or pleasure in doing things 0   Feeling down, depressed, or hopeless 0   PHQ-2 Score 0   PHQ-9 Total Score 0           Financial Resource Strain: Low Risk  (10/14/2024)    Overall Financial Resource Strain (CARDIA)     Difficulty of Paying Living Expenses: Not hard at all      Food Insecurity: No Food Insecurity (4/13/2025)    Hunger Vital Sign     Worried About Running Out of Food in the Last Year: Never true     Ran Out of Food in the Last Year: Never true          Health Maintenance Due   Topic Date Due    Diabetic foot exam  Never done    Diabetic retinal exam  Never done    Pneumococcal 50+ years Vaccine (1 of 2 - PCV) Never done    COVID-19 Vaccine (3 - 2024-25 season) 09/01/2024    DTaP/Tdap/Td vaccine (2 - Td or Tdap) 10/29/2024       
she is due for tetanus and Prevnar 20 vaccines.  - Counseling provided on the importance of staying up to date with vaccinations.  - Tetanus and Prevnar 20 vaccines will be administered today.           Return in about 6 months (around 10/14/2025) for Diabetes.          Medication risks/benefits/costs/interactions/alternatives discussed with patient.  Advised patient to call back or return to office if symptoms worsen/change/persist.  If patient cannot reach us or should anything more severe/urgent arise he/she should proceed directly to the nearest emergency department.  Discussed expected course/resolution/complications of diagnosis in detail with patient.    Patient given a written after visit summary which includes diagnoses, current medications and vitals.  Patient expressed understanding with the diagnosis and plan.    ILars MD, am scribing for and in the presence of Lars Davis MD. 4/14/25/8:26 AM EDT

## 2025-04-15 ENCOUNTER — RESULTS FOLLOW-UP (OUTPATIENT)
Age: 55
End: 2025-04-15

## 2025-04-17 ENCOUNTER — CLINICAL DOCUMENTATION (OUTPATIENT)
Facility: HOSPITAL | Age: 55
End: 2025-04-17

## 2025-04-17 NOTE — PROGRESS NOTES
[facility] at [facility]  Specialty Pharmacy Update    Date: 04/17/25    Brenda Ponce 1970    Medication: Mounjaro 5 mg/0.5 ml     Prior authorization was denied by insurance.  Rx sent to [facility] for another PA - patient has not been on Metformin and her A1C has actually increased while on Mounjaro so PA denied.  Office to f/u on appeal .     Corinne McEwen, RPH  [facility] at [address], [address]   [place] [address]  phone: [phone]   fax: [phone]

## 2025-05-21 ENCOUNTER — HOSPITAL ENCOUNTER (OUTPATIENT)
Facility: HOSPITAL | Age: 55
Discharge: HOME | End: 2025-05-24
Attending: FAMILY MEDICINE
Payer: COMMERCIAL

## 2025-05-21 DIAGNOSIS — Z12.31 ENCOUNTER FOR SCREENING MAMMOGRAM FOR BREAST CANCER: ICD-10-CM

## 2025-05-21 PROCEDURE — 77063 BREAST TOMOSYNTHESIS BI: CPT

## 2025-05-22 ENCOUNTER — TELEPHONE (OUTPATIENT)
Age: 55
End: 2025-05-22

## 2025-05-22 DIAGNOSIS — G47.00 INSOMNIA, UNSPECIFIED TYPE: ICD-10-CM

## 2025-05-22 RX ORDER — ZOLPIDEM TARTRATE 5 MG/1
TABLET ORAL
Qty: 30 TABLET | Refills: 2 | Status: SHIPPED | OUTPATIENT
Start: 2025-05-22 | End: 2025-06-21

## 2025-05-22 NOTE — TELEPHONE ENCOUNTER
[employer] sending message that zolpidem is not covered.  Suggested alternatives of eszopiclone & zaleplon.  (Rx Today- 5/22/25 30 + 2 refills)    Noted patient has been taking zolpidem- may be using coupon-. Noted 19.74 for #30 at [facility] w/Jose coupon.    Tried contacting patient- M for return call.  Thanks, Anny        For Pharmacy Admin Tracking Only    Program: Medication Refill  CPA in place:    Recommendation Provided To:   Intervention Detail: Discontinued Rx: 1, reason: Duplicate Therapy  Intervention Accepted By:   Gap Closed?:    Time Spent (min): 5

## 2025-05-23 ENCOUNTER — RESULTS FOLLOW-UP (OUTPATIENT)
Age: 55
End: 2025-05-23

## 2025-05-30 DIAGNOSIS — G47.00 INSOMNIA, UNSPECIFIED TYPE: ICD-10-CM

## 2025-05-30 NOTE — TELEPHONE ENCOUNTER
Pt states Ambien prescriptions needs to be written for 15 tablets or insurance will not cover  it.

## 2025-06-02 RX ORDER — ZOLPIDEM TARTRATE 5 MG/1
2.5 TABLET ORAL NIGHTLY PRN
Qty: 15 TABLET | Refills: 0 | Status: SHIPPED | OUTPATIENT
Start: 2025-06-02 | End: 2025-08-31

## 2025-06-02 NOTE — TELEPHONE ENCOUNTER
MD Davis,    Pt states Ambien prescriptions needs to be written for 15 tablets or insurance will not cover  it.     **Contacted [facility]- stating insurance only allows 1/2 tablet max/day.  **Cancelled RX sent on 5/22/5 for 30 + 2**    UPDATED RX to 1/2 tablet nightly prn.  Please RESEND.   ThanksAnny    Previous refill encounter(s): 5/22/25 30 + 2 (**CANCELLED this RX**)    Requested Prescriptions     Pending Prescriptions Disp Refills    zolpidem (AMBIEN) 5 MG tablet 15 tablet 2     Sig: Take 0.5 tablets by mouth nightly as needed for Sleep for up to 90 days. Max Daily Amount: 2.5 mg     For Pharmacy Admin Tracking Only    Program: Medication Refill  CPA in place:    Recommendation Provided To:   Intervention Detail: New Rx: 1, reason: Patient Preference  Intervention Accepted By:   Gap Closed?:    Time Spent (min): 10

## 2025-07-29 RX ORDER — HYDROCHLOROTHIAZIDE 12.5 MG/1
12.5 TABLET ORAL DAILY
Qty: 90 TABLET | Refills: 1 | Status: SHIPPED | OUTPATIENT
Start: 2025-07-29

## 2025-07-29 NOTE — TELEPHONE ENCOUNTER
PCP: Lars Davis MD    Last appt: 4/14/2025     Future Appointments   Date Time Provider Department Center   10/13/2025  9:30 AM Lars Davis MD [facility] DEP       Requested Prescriptions     Pending Prescriptions Disp Refills    hydroCHLOROthiazide 12.5 MG tablet [Pharmacy Med Name: HYDROCHLOROTHIAZIDE 12.5MG TABLETS] 90 tablet      Sig: TAKE 1 TABLET BY MOUTH DAILY       Prior labs and Blood pressures:  BP Readings from Last 3 Encounters:   04/14/25 109/65   10/14/24 103/69   04/29/24 105/73     Lab Results   Component Value Date/Time     04/14/2025 10:18 AM    K 4.0 04/14/2025 10:18 AM     04/14/2025 10:18 AM    CO2 29 04/14/2025 10:18 AM    BUN 20 04/14/2025 10:18 AM    GFRAA >60 08/22/2022 08:42 AM     Lab Results   Component Value Date/Time    OBX4EUMG 7.0 04/14/2025 09:49 AM     Lab Results   Component Value Date/Time    CHOL 209 04/14/2025 10:18 AM    HDL 57 04/14/2025 10:18 AM     04/14/2025 10:18 AM    .2 04/29/2024 10:40 AM    VLDL 16 04/14/2025 10:18 AM    VLDL 23 09/16/2020 08:16 AM     No results found for: \"VITD3\"    No results found for: \"TSH\", \"TSH2\", \"TSH3\"

## (undated) DEVICE — GOWN,SIRUS,FABRNF,XL,20/CS: Brand: MEDLINE

## (undated) DEVICE — SOLUTION IRRIGATION H2O 0797305] ICU MEDICAL INC]

## (undated) DEVICE — JELLY,LUBE,STERILE,FLIP TOP,TUBE,4-OZ: Brand: MEDLINE

## (undated) DEVICE — TIDISHIELD UROLOGY DRAIN BAGS FROSTY VINYL STERILE FITS SIEMENS UROSKOP ACCESS 20 PER CASE: Brand: TIDISHIELD

## (undated) DEVICE — DERMABOND SKIN ADH 0.7ML -- DERMABOND ADVANCED 12/BX

## (undated) DEVICE — DRAINBAG,ANTI-REFLUX TOWER,L/F,2000ML,LL: Brand: MEDLINE

## (undated) DEVICE — WRAP SURG W1.31XL1.34M CARD FOR PT 165-172CM THERMOWRP

## (undated) DEVICE — PACK,BASIC,SIRUS,V: Brand: MEDLINE

## (undated) DEVICE — SUTURE PERMAHAND SZ 2-0 L30IN NONABSORBABLE BLK SILK W/O A305H

## (undated) DEVICE — REM POLYHESIVE ADULT PATIENT RETURN ELECTRODE: Brand: VALLEYLAB

## (undated) DEVICE — VISUALIZATION SYSTEM: Brand: CLEARIFY

## (undated) DEVICE — STERILE POLYISOPRENE POWDER-FREE SURGICAL GLOVES WITH EMOLLIENT COATING: Brand: PROTEXIS

## (undated) DEVICE — SURGICAL PROCEDURE PACK BASIN MAJ SET CUST NO CAUT

## (undated) DEVICE — SUT ETHLN 3-0 18IN PS1 BLK --

## (undated) DEVICE — CYSTO/BLADDER IRRIGATION SET, REGULATING CLAMP

## (undated) DEVICE — NEEDLE HYPO 22GA L1.5IN BLK POLYPR HUB S STL REG BVL STR

## (undated) DEVICE — STRAP,POSITIONING,KNEE/BODY,FOAM,4X60": Brand: MEDLINE

## (undated) DEVICE — TRAY PREP DRY W/ PREM GLV 2 APPL 6 SPNG 2 UNDPD 1 OVERWRAP

## (undated) DEVICE — STAPLER INT DIA29MM CLS STPL H1.5-2.2MM OPN LEG L5.2MM 26

## (undated) DEVICE — GARMENT,MEDLINE,DVT,INT,CALF,MED, GEN2: Brand: MEDLINE

## (undated) DEVICE — STAPLER INT L34CM 60MM LNG ENDOSCP ARTC PWR + ECHELON FLX

## (undated) DEVICE — SOLUTION IV 1000ML 0.9% SOD CHL

## (undated) DEVICE — SUTURE PERMAHAND SZ 3-0 L18IN NONABSORBABLE BLK L26MM SH C013D

## (undated) DEVICE — YANKAUER,POOLE TIP,STERILE,50/CS: Brand: MEDLINE

## (undated) DEVICE — Device

## (undated) DEVICE — RESERVOIR,SUCTION,100CC,SILICONE: Brand: MEDLINE

## (undated) DEVICE — FORCEPS BX L240CM JAW DIA2.8MM L CAP W/ NDL MIC MESH TOOTH

## (undated) DEVICE — SPONGE LAP 18X18IN STRL -- 5/PK

## (undated) DEVICE — SYR LR LCK 1ML GRAD NSAF 30ML --

## (undated) DEVICE — TOTAL TRAY, 16FR 10ML SIL FOLEY, URN: Brand: MEDLINE

## (undated) DEVICE — DRAPE,ROBOTICS,STERILE: Brand: MEDLINE

## (undated) DEVICE — CLICKLINE SCISSORS INSERT: Brand: CLICKLINE

## (undated) DEVICE — DRAPE,REIN 53X77,STERILE: Brand: MEDLINE

## (undated) DEVICE — COLON CLOSING PACK: Brand: MEDLINE INDUSTRIES, INC.

## (undated) DEVICE — BARRIER TISS ABSRB 5X6IN 1/PK -- DIRECT ORDER ONLY NON MEDLINE

## (undated) DEVICE — SKIN TEMPERATURE SENSOR: Brand: DEROYAL

## (undated) DEVICE — SOLUTION IRRIG 1000ML H2O STRL BLT

## (undated) DEVICE — CATHETER URETH 26FR 30CC BLLN F 3 W SPEC M RND TIP TWO

## (undated) DEVICE — GLOVE SURG SZ 75 L1212IN FNGR THK138MIL BRN LTX FREE

## (undated) DEVICE — STERILE-Z MAYO STAND COVERS CLEAR POLYETHYLENE STERILE UNIVERSAL FIT 20 PER CASE: Brand: STERILE-Z

## (undated) DEVICE — ROCKER SWITCH PENCIL BLADE ELECTRODE, HOLSTER: Brand: EDGE

## (undated) DEVICE — SUTURE VCRL SZ 2-0 L27IN ABSRB UD L26MM SH 1/2 CIR J417H

## (undated) DEVICE — SOLUTION IRRIG 3000ML 0.9% SOD CHL FLX CONT 0797208] ICU MEDICAL INC]

## (undated) DEVICE — SUT ETHLN 3-0 18IN PS2 BLK --

## (undated) DEVICE — 3M™ RANGER™ FLUID WARMING IRRIGATION SET, 24750, 10/CASE: Brand: 3M™ RANGER™

## (undated) DEVICE — RELOAD STPL L45MM H1.5-3.6MM REG TISS BLU GRIPPING SURF B

## (undated) DEVICE — SUTURE PERMAHAND SZ 2-0 L18IN NONABSORBABLE BLK L26MM SH C012D

## (undated) DEVICE — (D)PREP SKN CHLRAPRP APPL 26ML -- CONVERT TO ITEM 371833

## (undated) DEVICE — SUTURE PROL SZ 2-0 L36IN NONABSORBABLE BLU SH L26MM 1/2 CIR 8523H

## (undated) DEVICE — SUTURE PDS II SZ 1 L27IN ABSRB VLT CT-1 L36MM 1/2 CIR Z341H

## (undated) DEVICE — LAPAROSCOPIC TROCAR SLEEVE/SINGLE USE: Brand: KII® OPTICAL ACCESS SYSTEM

## (undated) DEVICE — OPEN-END URETERAL CATHETER: Brand: COOK

## (undated) DEVICE — SUTURE MCRYL SZ 4-0 L27IN ABSRB UD L19MM PS-2 1/2 CIR PRIM Y426H

## (undated) DEVICE — RELOAD STPL L60MM H1-2.6MM MESENTERY THN TISS WHT 6 ROW

## (undated) DEVICE — FILTER SMK EVAC FLO CLR MEGADYNE

## (undated) DEVICE — DRAIN SURG W10XL20CM SIL SMOOTH FLAT 3/4 PERF DBL WRP

## (undated) DEVICE — ACCESS PLATFORM FOR MINIMALLY INVASIVE SURGERY.: Brand: GELPORT® LAPAROSCOPIC  SYSTEM

## (undated) DEVICE — STAPLER INT L340MM 45MM STD 12 FIRING B FRM PWR + GRIPPING

## (undated) DEVICE — SOLUTION SCRB 2OZ 10% POVIDONE IOD ANTIMIC BTL

## (undated) DEVICE — TBG INSUFFLATION LUER LOCK: Brand: MEDLINE INDUSTRIES, INC.

## (undated) DEVICE — DRAPE FLD WRM W44XL66IN C6L FOR INTRATEMP SYS THERMABASIN

## (undated) DEVICE — SUTURE VCRL SZ 3-0 L27IN ABSRB VLT L26MM SH 1/2 CIR J316H

## (undated) DEVICE — INFECTION CONTROL KIT SYS

## (undated) DEVICE — Device: Brand: BALLOON3

## (undated) DEVICE — TROCAR: Brand: KII® OPTICAL ACCESS SYSTEM

## (undated) DEVICE — Z INACTIVE USE 2527070 DRAPE SURG W40XL44IN UNDERBUTTOCK SMS POLYPR W/ PCH BK DISP

## (undated) DEVICE — RELOAD STPL L60MM H1.5-3.6MM REG TISS BLU GRIPPING SURF B

## (undated) DEVICE — NEEDLE HYPO 22GA L1.5IN BLK S STL HUB POLYPR SHLD REG BVL

## (undated) DEVICE — TOWEL SURG W17XL27IN STD BLU COT NONFENESTRATED PREWASHED

## (undated) DEVICE — PACK,CYSTOSCOPY,PK III,SIRUS: Brand: MEDLINE